# Patient Record
Sex: FEMALE | Race: WHITE | Employment: OTHER | URBAN - METROPOLITAN AREA
[De-identification: names, ages, dates, MRNs, and addresses within clinical notes are randomized per-mention and may not be internally consistent; named-entity substitution may affect disease eponyms.]

---

## 2017-01-19 ENCOUNTER — OFFICE VISIT (OUTPATIENT)
Dept: INTERNAL MEDICINE CLINIC | Facility: CLINIC | Age: 62
End: 2017-01-19

## 2017-01-19 VITALS
SYSTOLIC BLOOD PRESSURE: 130 MMHG | WEIGHT: 256 LBS | DIASTOLIC BLOOD PRESSURE: 80 MMHG | RESPIRATION RATE: 16 BRPM | BODY MASS INDEX: 40.18 KG/M2 | HEART RATE: 76 BPM | HEIGHT: 67 IN

## 2017-01-19 DIAGNOSIS — R73.03 PREDIABETES: ICD-10-CM

## 2017-01-19 DIAGNOSIS — E66.01 OBESITY, CLASS III, BMI 40-49.9 (MORBID OBESITY) (HCC): ICD-10-CM

## 2017-01-19 DIAGNOSIS — Z51.81 ENCOUNTER FOR THERAPEUTIC DRUG MONITORING: Primary | ICD-10-CM

## 2017-01-19 PROCEDURE — 99213 OFFICE O/P EST LOW 20 MIN: CPT | Performed by: NURSE PRACTITIONER

## 2017-01-19 RX ORDER — METFORMIN HYDROCHLORIDE 750 MG/1
750 TABLET, EXTENDED RELEASE ORAL
Qty: 90 TABLET | Refills: 0 | Status: SHIPPED | OUTPATIENT
Start: 2017-01-19 | End: 2017-02-17 | Stop reason: DRUGHIGH

## 2017-01-19 RX ORDER — TOPIRAMATE 50 MG/1
50 TABLET, FILM COATED ORAL 2 TIMES DAILY
Qty: 180 TABLET | Refills: 0 | Status: SHIPPED | OUTPATIENT
Start: 2017-01-19 | End: 2017-03-16 | Stop reason: ALTCHOICE

## 2017-01-19 RX ORDER — BUPROPION HYDROCHLORIDE 100 MG/1
100 TABLET ORAL 2 TIMES DAILY
Qty: 180 TABLET | Refills: 0 | Status: SHIPPED | OUTPATIENT
Start: 2017-01-19 | End: 2017-03-16 | Stop reason: DRUGHIGH

## 2017-01-19 RX ORDER — PHENTERMINE HYDROCHLORIDE 15 MG/1
15 CAPSULE ORAL EVERY MORNING
Qty: 30 CAPSULE | Refills: 0 | Status: SHIPPED | OUTPATIENT
Start: 2017-01-19 | End: 2017-02-17

## 2017-01-19 NOTE — PROGRESS NOTES
CC: Patient presents with:  Weight Check: lost 2 pounds       HPI:   Obesity. Patient doing well on phentermine, topamax, metformin and wellbutrin which she feels has really helped with her stress level.       Current Outpatient Prescriptions:  Phent Vitamins-Minerals (MULTIPLE VITAMINS/WOMENS OR) Take 1 tablet by mouth daily. Disp:  Rfl:    Red Yeast Rice 600 MG Oral Cap Take 1 capsule by mouth daily. Disp:  Rfl:    Ascorbic Acid (VITAMIN C) 250 MG Oral Tab Take 250 mg by mouth daily.  Disp:  Rfl: types were placed in this encounter. Signed Prescriptions Disp Refills    Phentermine HCl 15 MG Oral Cap 30 capsule 0      Sig: Take 1 capsule (15 mg total) by mouth every morning.       MetFORMIN HCl  MG Oral Tablet 24 Hr 90 tablet 0      Sig:

## 2017-01-19 NOTE — PATIENT INSTRUCTIONS
Get back to physical therapy. Caring for Your Back Throughout the Day  Take care of your back throughout the day. You will likely have fewer back problems if you do. Try to warm up before you move. Shift positions often.  Also do your best to form healt

## 2017-01-23 ENCOUNTER — TELEPHONE (OUTPATIENT)
Dept: FAMILY MEDICINE CLINIC | Facility: CLINIC | Age: 62
End: 2017-01-23

## 2017-01-23 NOTE — TELEPHONE ENCOUNTER
Pharmacy called because patient got RX for phentermine today. They wanted to make sure NP was aware patient has high blood pressure. I informed pharmacy she is aware, okay to fill.

## 2017-02-14 ENCOUNTER — APPOINTMENT (OUTPATIENT)
Dept: LAB | Age: 62
End: 2017-02-14
Attending: NURSE PRACTITIONER
Payer: COMMERCIAL

## 2017-02-14 ENCOUNTER — OFFICE VISIT (OUTPATIENT)
Dept: FAMILY MEDICINE CLINIC | Facility: CLINIC | Age: 62
End: 2017-02-14

## 2017-02-14 VITALS
RESPIRATION RATE: 14 BRPM | SYSTOLIC BLOOD PRESSURE: 138 MMHG | HEART RATE: 68 BPM | DIASTOLIC BLOOD PRESSURE: 88 MMHG | WEIGHT: 259 LBS | BODY MASS INDEX: 41.13 KG/M2 | HEIGHT: 66.5 IN

## 2017-02-14 DIAGNOSIS — K21.9 GASTROESOPHAGEAL REFLUX DISEASE WITHOUT ESOPHAGITIS: ICD-10-CM

## 2017-02-14 DIAGNOSIS — E78.5 HYPERLIPIDEMIA, UNSPECIFIED HYPERLIPIDEMIA TYPE: ICD-10-CM

## 2017-02-14 DIAGNOSIS — I10 ESSENTIAL HYPERTENSION, BENIGN: ICD-10-CM

## 2017-02-14 DIAGNOSIS — I10 ESSENTIAL HYPERTENSION, BENIGN: Primary | ICD-10-CM

## 2017-02-14 DIAGNOSIS — R73.03 PREDIABETES: ICD-10-CM

## 2017-02-14 DIAGNOSIS — E66.01 OBESITY, CLASS III, BMI 40-49.9 (MORBID OBESITY) (HCC): ICD-10-CM

## 2017-02-14 LAB
ALBUMIN SERPL-MCNC: 4 G/DL (ref 3.5–4.8)
ALP LIVER SERPL-CCNC: 78 U/L (ref 50–130)
ALT SERPL-CCNC: 30 U/L (ref 14–54)
AST SERPL-CCNC: 27 U/L (ref 15–41)
BILIRUB SERPL-MCNC: 0.6 MG/DL (ref 0.1–2)
BUN BLD-MCNC: 21 MG/DL (ref 8–20)
CALCIUM BLD-MCNC: 9.3 MG/DL (ref 8.3–10.3)
CHLORIDE: 107 MMOL/L (ref 101–111)
CHOLEST SMN-MCNC: 207 MG/DL (ref ?–200)
CO2: 25 MMOL/L (ref 22–32)
CREAT BLD-MCNC: 1.12 MG/DL (ref 0.55–1.02)
EST. AVERAGE GLUCOSE BLD GHB EST-MCNC: 131 MG/DL (ref 68–126)
GLUCOSE BLD-MCNC: 104 MG/DL (ref 70–99)
HBA1C MFR BLD HPLC: 6.2 % (ref ?–5.7)
HDLC SERPL-MCNC: 67 MG/DL (ref 45–?)
HDLC SERPL: 3.09 {RATIO} (ref ?–4.44)
LDLC SERPL CALC-MCNC: 121 MG/DL (ref ?–130)
M PROTEIN MFR SERPL ELPH: 7.6 G/DL (ref 6.1–8.3)
NONHDLC SERPL-MCNC: 140 MG/DL (ref ?–130)
POTASSIUM SERPL-SCNC: 3.9 MMOL/L (ref 3.6–5.1)
SODIUM SERPL-SCNC: 141 MMOL/L (ref 136–144)
TRIGLYCERIDES: 94 MG/DL (ref ?–150)
VLDL: 19 MG/DL (ref 5–40)

## 2017-02-14 PROCEDURE — 83036 HEMOGLOBIN GLYCOSYLATED A1C: CPT

## 2017-02-14 PROCEDURE — 80053 COMPREHEN METABOLIC PANEL: CPT

## 2017-02-14 PROCEDURE — 99214 OFFICE O/P EST MOD 30 MIN: CPT | Performed by: NURSE PRACTITIONER

## 2017-02-14 PROCEDURE — 36415 COLL VENOUS BLD VENIPUNCTURE: CPT

## 2017-02-14 PROCEDURE — 80061 LIPID PANEL: CPT

## 2017-02-14 RX ORDER — LACTOBACIL 2/BIFIDO 1/S.THERMO 450B CELL
1 PACKET (EA) ORAL DAILY
Qty: 60 CAPSULE | Refills: 3 | COMMUNITY
Start: 2017-02-14 | End: 2019-06-19

## 2017-02-14 RX ORDER — OMEPRAZOLE 20 MG/1
20 CAPSULE, DELAYED RELEASE ORAL
Qty: 90 CAPSULE | Refills: 3 | Status: SHIPPED | OUTPATIENT
Start: 2017-02-14 | End: 2018-02-08

## 2017-02-14 RX ORDER — LISINOPRIL AND HYDROCHLOROTHIAZIDE 12.5; 1 MG/1; MG/1
1 TABLET ORAL
Qty: 90 TABLET | Refills: 3 | Status: SHIPPED | OUTPATIENT
Start: 2017-02-14 | End: 2018-02-08

## 2017-02-14 NOTE — PROGRESS NOTES
Mihcelle Reed is a 64year old female. HPI:   Patient presents for recheck of her hypertension, GERD, chronic LBP. Had back surgery in 7/2016 and reports back pain is improved but still present.  Continues with Cymbalta for LBP which is helpful at with goal <140/90.  - Lisinopril-Hydrochlorothiazide 10-12.5 MG Oral Tab; Take 1 tablet by mouth once daily. Dispense: 90 tablet; Refill: 3  - Probiotic Product (VSL#3) Oral Cap; Take 1 capsule by mouth daily. Dispense: 60 capsule; Refill: 3    2.  Hyperl can take time to develop into a habit, typically 6-8 weeks. Begin at your own pace.   A lifetime of fitness offers many benefits like:  · Decreasing your risk of health problems, such as heart disease, high blood pressure, diabetes, and some types of cancer

## 2017-02-14 NOTE — PATIENT INSTRUCTIONS
Advise to increase Metformin XR to 1500 mg daily. Recommend Victoza or Saxenda add. Exercise necessary to help with weight loss and control BP. Consider dietician follow up at [facility] due to stagnant weight. ASCVD risk at 5.3%.  Consider statin therapy, ranjit

## 2017-02-15 RX ORDER — CYCLOBENZAPRINE HCL 10 MG
TABLET ORAL
Qty: 45 TABLET | Refills: 0 | Status: SHIPPED | OUTPATIENT
Start: 2017-02-15 | End: 2019-01-10

## 2017-02-15 NOTE — TELEPHONE ENCOUNTER
Medication: Cyclobenzaprine    Date of last refill: 12/5/16  [place]    Last office visit: 10/20/16  Due back to clinic per last office note:   Follow up at the completion of physical therapy or as needed  Date next o

## 2017-02-15 NOTE — TELEPHONE ENCOUNTER
Flexeril was approved  If she continues to have ongoing back problems recommend she make a follow-up appointment.   Her last visit was in October 2016

## 2017-02-17 ENCOUNTER — OFFICE VISIT (OUTPATIENT)
Dept: INTERNAL MEDICINE CLINIC | Facility: CLINIC | Age: 62
End: 2017-02-17

## 2017-02-17 VITALS
SYSTOLIC BLOOD PRESSURE: 136 MMHG | RESPIRATION RATE: 16 BRPM | BODY MASS INDEX: 40.18 KG/M2 | HEIGHT: 67 IN | DIASTOLIC BLOOD PRESSURE: 88 MMHG | WEIGHT: 256 LBS | HEART RATE: 88 BPM

## 2017-02-17 DIAGNOSIS — Z51.81 ENCOUNTER FOR THERAPEUTIC DRUG MONITORING: Primary | ICD-10-CM

## 2017-02-17 DIAGNOSIS — R73.03 PREDIABETES: ICD-10-CM

## 2017-02-17 DIAGNOSIS — E66.01 OBESITY, CLASS III, BMI 40-49.9 (MORBID OBESITY) (HCC): ICD-10-CM

## 2017-02-17 PROCEDURE — 99213 OFFICE O/P EST LOW 20 MIN: CPT | Performed by: NURSE PRACTITIONER

## 2017-02-17 RX ORDER — PHENTERMINE HYDROCHLORIDE 15 MG/1
15 CAPSULE ORAL EVERY MORNING
Qty: 30 CAPSULE | Refills: 0 | Status: SHIPPED | OUTPATIENT
Start: 2017-02-17 | End: 2017-03-16

## 2017-02-17 RX ORDER — METFORMIN HYDROCHLORIDE 750 MG/1
1500 TABLET, EXTENDED RELEASE ORAL
Qty: 180 TABLET | Refills: 1 | Status: SHIPPED | OUTPATIENT
Start: 2017-02-17 | End: 2017-05-18

## 2017-02-17 NOTE — PATIENT INSTRUCTIONS
Get back to exercise at Cardiac Rehab [phone]  Make appointment with back doctor to see if Physical Therapy would be an option  Diabetes: The Benefits of Exercise     Take the stairs whenever you can.    Exercise can lower blood sugar, help control wilfrido Your main goal is to become more active. Even a little bit helps. Choose an activity that you like. Walking is one great form of exercise that everyone can do.  Talk to your healthcare provider about any limits you may have before starting with an exercise

## 2017-02-17 NOTE — PROGRESS NOTES
CC: Patient presents with:  Weight Check: no weight change       HPI:   Morbid Obesity. Patient is doing well on phentermine, wellbutrin, topamax and metformin without any complaints.   is not working and at home all the time now so stress is Fluticasone Propionate (FLONASE) 50 MCG/ACT Nasal Suspension 2 sprays by Nasal route daily. (Patient taking differently: 2 sprays by Nasal route daily as needed.  ) Disp: 3 Bottle Rfl: 3   Calcium Carbonate (CALTRATE 600 OR) Take 1 tablet by mouth daily. Gastroesophageal reflux disease     Bilateral lumbar radiculopathy     Stress        REVIEW OF SYSTEMS:   RESPIRATORY: denies shortness of breath   CARDIOVASCULAR: denies chest pain  GI: denies constipation  BACK/MUSCULOSKELETAL: still some lower back pain indicates understanding of these issues and agrees to the plan. Return in about 4 weeks (around 3/17/2017).

## 2017-03-02 ENCOUNTER — TELEPHONE (OUTPATIENT)
Dept: FAMILY MEDICINE CLINIC | Facility: CLINIC | Age: 62
End: 2017-03-02

## 2017-03-07 ENCOUNTER — NURSE ONLY (OUTPATIENT)
Dept: CARDIAC REHAB | Facility: HOSPITAL | Age: 62
End: 2017-03-07
Attending: FAMILY MEDICINE

## 2017-03-14 NOTE — TELEPHONE ENCOUNTER
Why was she out of the gym? Was this related to hx of LBP and surgery over 1 year ago or cardiac reason? If cardiac reason will need cardiology clearance. Please clarify as patient did not inform me of any restrictions when at [facility]. Thanks.

## 2017-03-14 NOTE — TELEPHONE ENCOUNTER
Reviewed EPIC, Patient is currently in Phase III or Cardiac rehab. Last seen by YAN Morales on 02/14/2017, however continues to see [facility].    msg forward to Erich Pond

## 2017-03-16 ENCOUNTER — OFFICE VISIT (OUTPATIENT)
Dept: INTERNAL MEDICINE CLINIC | Facility: CLINIC | Age: 62
End: 2017-03-16

## 2017-03-16 ENCOUNTER — TELEPHONE (OUTPATIENT)
Dept: INTERNAL MEDICINE CLINIC | Facility: CLINIC | Age: 62
End: 2017-03-16

## 2017-03-16 VITALS
SYSTOLIC BLOOD PRESSURE: 138 MMHG | WEIGHT: 259 LBS | RESPIRATION RATE: 16 BRPM | HEART RATE: 80 BPM | HEIGHT: 67 IN | DIASTOLIC BLOOD PRESSURE: 88 MMHG | BODY MASS INDEX: 40.65 KG/M2

## 2017-03-16 DIAGNOSIS — E66.01 OBESITY, CLASS III, BMI 40-49.9 (MORBID OBESITY) (HCC): ICD-10-CM

## 2017-03-16 DIAGNOSIS — Z51.81 ENCOUNTER FOR THERAPEUTIC DRUG MONITORING: Primary | ICD-10-CM

## 2017-03-16 PROCEDURE — 99213 OFFICE O/P EST LOW 20 MIN: CPT | Performed by: NURSE PRACTITIONER

## 2017-03-16 RX ORDER — PHENTERMINE HYDROCHLORIDE 15 MG/1
15 CAPSULE ORAL EVERY MORNING
Qty: 30 CAPSULE | Refills: 0 | Status: SHIPPED | OUTPATIENT
Start: 2017-03-16 | End: 2017-04-13

## 2017-03-16 RX ORDER — BUPROPION HYDROCHLORIDE 150 MG/1
150 TABLET ORAL DAILY
Qty: 30 TABLET | Refills: 0 | Status: SHIPPED | OUTPATIENT
Start: 2017-03-16 | End: 2017-04-13

## 2017-03-16 NOTE — PROGRESS NOTES
CC: Patient presents with:  Weight Check: 3 pound weight gain       HPI:   Morbid Obesity. Patient tolerating phentermine,  Increased metformin, topamax and  Wellbutrin. Feels helps with appetite controll and feels better.  Has restarted Phase 3 Crdia Rfl:    Fluticasone Propionate (FLONASE) 50 MCG/ACT Nasal Suspension 2 sprays by Nasal route daily.  (Patient taking differently: 2 sprays by Nasal route daily as needed.  ) Disp: 3 Bottle Rfl: 3   Calcium Carbonate (CALTRATE 600 OR) Take 1 tablet by mouth Gastroesophageal reflux disease     Bilateral lumbar radiculopathy     Stress        REVIEW OF SYSTEMS:   RESPIRATORY: denies shortness of breath   CARDIOVASCULAR: denies chest pain  GI: denies constipation    EXAM:   /88 mmHg  Pulse 80  Resp 16  Ht

## 2017-03-16 NOTE — PATIENT INSTRUCTIONS
Stop topiramate and buproprion 100mg   Exercise: Measuring Your Pace  Getting your heart to work at the right pace is important. It means you’ll develop better aerobic endurance.  This happens because your heart gets stronger and more efficient from the [employer] Date Last Reviewed: 8/13/2015  © 6784-6545 [address], [address] [place], [place] [address]. All rights reserved. This information is not intended as a substitute for professional medical care.  Always follow your healthcare professional

## 2017-03-23 ENCOUNTER — OFFICE VISIT (OUTPATIENT)
Dept: INTERNAL MEDICINE CLINIC | Facility: CLINIC | Age: 62
End: 2017-03-23

## 2017-03-23 VITALS — WEIGHT: 259 LBS | BODY MASS INDEX: 41 KG/M2

## 2017-03-23 DIAGNOSIS — E66.01 OBESITY, CLASS III, BMI 40-49.9 (MORBID OBESITY) (HCC): ICD-10-CM

## 2017-03-23 PROCEDURE — 97802 MEDICAL NUTRITION INDIV IN: CPT | Performed by: DIETITIAN, REGISTERED

## 2017-03-23 NOTE — PROGRESS NOTES
INITIAL OUTPATIENT NUTRITION CONSULTATION    Nutrition Assessment    Medical Diagnosis: Obesity, reduced GFR with elevated BUN/Cr    Physical Findings: knee pain    Client Hx: 64year old female,  with adult children    Problem List as of 3/23/2 Probiotic Product (VSL#3) Oral Cap Take 1 capsule by mouth daily.  Disp: 60 capsule Rfl: 3   Albuterol Sulfate HFA (PROAIR HFA) 108 (90 BASE) MCG/ACT Inhalation Aero Soln Inhale 2 puffs into the lungs every 4 (four) hours as needed for Wheezing or Shortne Range Status   02/14/2017 121 <130 mg/dL Final   ----------  LDL-CHOLESTEROL   Date Value Ref Range Status   11/21/2013 138* <130 mg/dL (calc) Final   Comment:     Desirable range <100 mg/dL for patients with CHD or  diabetes and <70 mg/dL for diabetic pat Activity: 1-2 hrs/week at cardiac rehab    Food Journal: no    Spent 45 minutes in consultation with the patient. Nutrition Intervention/Education:  Comprehensive nutrition education and evaluation provided for weight loss.  Pt familiar to me through L

## 2017-04-13 ENCOUNTER — OFFICE VISIT (OUTPATIENT)
Dept: INTERNAL MEDICINE CLINIC | Facility: CLINIC | Age: 62
End: 2017-04-13

## 2017-04-13 VITALS
WEIGHT: 253 LBS | SYSTOLIC BLOOD PRESSURE: 132 MMHG | HEART RATE: 80 BPM | DIASTOLIC BLOOD PRESSURE: 84 MMHG | HEIGHT: 67 IN | RESPIRATION RATE: 16 BRPM | BODY MASS INDEX: 39.71 KG/M2

## 2017-04-13 DIAGNOSIS — B37.2 YEAST INFECTION OF THE SKIN: ICD-10-CM

## 2017-04-13 DIAGNOSIS — F43.9 STRESS: ICD-10-CM

## 2017-04-13 DIAGNOSIS — Z51.81 ENCOUNTER FOR THERAPEUTIC DRUG MONITORING: Primary | ICD-10-CM

## 2017-04-13 DIAGNOSIS — E66.01 OBESITY, CLASS III, BMI 40-49.9 (MORBID OBESITY) (HCC): ICD-10-CM

## 2017-04-13 PROCEDURE — 99213 OFFICE O/P EST LOW 20 MIN: CPT | Performed by: NURSE PRACTITIONER

## 2017-04-13 RX ORDER — BUPROPION HYDROCHLORIDE 150 MG/1
150 TABLET ORAL DAILY
Qty: 30 TABLET | Refills: 2 | Status: SHIPPED | OUTPATIENT
Start: 2017-04-13 | End: 2017-06-13

## 2017-04-13 RX ORDER — PHENTERMINE HYDROCHLORIDE 15 MG/1
15 CAPSULE ORAL EVERY MORNING
Qty: 30 CAPSULE | Refills: 0 | Status: SHIPPED | OUTPATIENT
Start: 2017-04-13 | End: 2017-05-11

## 2017-04-13 RX ORDER — CLOTRIMAZOLE AND BETAMETHASONE DIPROPIONATE 10; .64 MG/G; MG/G
1 CREAM TOPICAL 2 TIMES DAILY PRN
Qty: 45 G | Refills: 1 | Status: SHIPPED | OUTPATIENT
Start: 2017-04-13 | End: 2017-04-14

## 2017-04-13 NOTE — PROGRESS NOTES
CC: Patient presents with:  Weight Check: lost 6 pounds       HPI:   Obesity.  Patient doing well on phentermine, metformin and likes trokendi a lot and easier to get bupropion in 1 dose with all other meds in am.  She is going to Phase 3 cardiac reha route daily. (Patient taking differently: 2 sprays by Nasal route daily as needed.  ) Disp: 3 Bottle Rfl: 3   Calcium Carbonate (CALTRATE 600 OR) Take 1 tablet by mouth daily.    Disp:  Rfl:    Omega-3 Fatty Acids (FISH OIL) 1200 MG Oral Cap Take 1 capsule OF SYSTEMS:   RESPIRATORY: denies shortness of breath   CARDIOVASCULAR: denies chest pain  GI: denies constipation or diarrhea    EXAM:   /84 mmHg  Pulse 80  Resp 16  Ht 67\"  Wt 253 lb  BMI 39.62 kg/m2  LMP 07/01/2007  GENERAL: pleasant,but sad, A/O psychologist and very interested in meeting with her. Will have Heidi Camacho PsycD call patient. 3. Yeast infection of skin.  She develps reddish rash in abdominal area from skin chafing and sweat and cream has worked before to help and she brought in l

## 2017-04-13 NOTE — PATIENT INSTRUCTIONS
Will have Lexie Daniel PsyD call you as with a patient currently. Keep up the good work and exercising. Stress Relief: Activities  When you're feeling stressed, some simple exercises can provide relief right away.  These exercises are not the kind

## 2017-04-14 DIAGNOSIS — B37.2 YEAST INFECTION OF THE SKIN: Primary | ICD-10-CM

## 2017-04-14 RX ORDER — CLOTRIMAZOLE AND BETAMETHASONE DIPROPIONATE 10; .64 MG/G; MG/G
CREAM TOPICAL
Qty: 45 G | Refills: 1 | Status: SHIPPED | OUTPATIENT
Start: 2017-04-14 | End: 2021-11-04

## 2017-04-14 NOTE — TELEPHONE ENCOUNTER
[facility] would like clarification on the DIRECTIONS, it states to apply \"1 tube. \"     Please advise on the clarification to the instructions.

## 2017-05-04 ENCOUNTER — OFFICE VISIT (OUTPATIENT)
Dept: SURGERY | Facility: CLINIC | Age: 62
End: 2017-05-04

## 2017-05-04 ENCOUNTER — TELEPHONE (OUTPATIENT)
Dept: SURGERY | Facility: CLINIC | Age: 62
End: 2017-05-04

## 2017-05-04 VITALS
HEART RATE: 99 BPM | HEIGHT: 66 IN | BODY MASS INDEX: 39.86 KG/M2 | DIASTOLIC BLOOD PRESSURE: 80 MMHG | SYSTOLIC BLOOD PRESSURE: 126 MMHG | WEIGHT: 248 LBS | RESPIRATION RATE: 16 BRPM

## 2017-05-04 DIAGNOSIS — Z98.890 STATUS POST LUMBAR SPINE SURGERY FOR DECOMPRESSION OF SPINAL CORD: ICD-10-CM

## 2017-05-04 DIAGNOSIS — M54.42 CHRONIC LEFT-SIDED LOW BACK PAIN WITH LEFT-SIDED SCIATICA: ICD-10-CM

## 2017-05-04 DIAGNOSIS — M51.37 DEGENERATION OF LUMBAR OR LUMBOSACRAL INTERVERTEBRAL DISC: Primary | ICD-10-CM

## 2017-05-04 DIAGNOSIS — G89.29 CHRONIC LEFT-SIDED LOW BACK PAIN WITH LEFT-SIDED SCIATICA: ICD-10-CM

## 2017-05-04 PROCEDURE — 99212 OFFICE O/P EST SF 10 MIN: CPT | Performed by: PHYSICIAN ASSISTANT

## 2017-05-04 RX ORDER — MELOXICAM 15 MG/1
15 TABLET ORAL DAILY
Qty: 30 TABLET | Refills: 0 | Status: SHIPPED | OUTPATIENT
Start: 2017-05-04 | End: 2017-07-24

## 2017-05-04 RX ORDER — CYCLOBENZAPRINE HCL 10 MG
10 TABLET ORAL 3 TIMES DAILY PRN
Qty: 60 TABLET | Refills: 0 | Status: SHIPPED | OUTPATIENT
Start: 2017-05-04 | End: 2017-05-11

## 2017-05-04 NOTE — PATIENT INSTRUCTIONS
Refill policies:    • Allow 2 business days for refills; controlled substances may take longer.   • Contact your pharmacy at least 5 days prior to running out of medication and have them send an electronic request or submit request through the “request re insurance carrier to obtain pre-certification or prior authorization. Unfortunately, [facility] has seen an increase in denial of payment even though the procedure/test has been pre-certified.   You are strongly encouraged to contact your insurance carrier to v

## 2017-05-04 NOTE — PROGRESS NOTES
Neurosurgery Clinic Visit  2017    Tory Cord PCP:  Gary Forte MD    10/3/1955 MRN [id number]       CC: Back Pain    HPI:    Patient continues to experience lumbosacral midline pain into the left buttock, sometimes lateral left thigh.   Danielle Rodríguez acetaminophen 325 MG Oral Tab  Take 650 mg by mouth every 6 (six) hours as needed for Pain.  Disp:   Rfl:     topiramate 25 MG Oral Tab  Take 1 tablet (25 mg total) by mouth 2 (two) times daily.  Disp: 60 tablet  Rfl: 2    Fluticasone Propionate (FLONASE) 2.  L4-5 degenerative spondylolisthesis with axial back pain improved    PLAN:             Switch to mobic qd. Rx flexeril refilled. MRI Lumbar w/ and w/o. XR Lumbar flex/ex. Consult to pain service, she has never had injections for her lumbar pain.   Kylee Theodore

## 2017-05-08 ENCOUNTER — OFFICE VISIT (OUTPATIENT)
Dept: INTERNAL MEDICINE CLINIC | Facility: CLINIC | Age: 62
End: 2017-05-08

## 2017-05-08 VITALS — WEIGHT: 250.81 LBS | BODY MASS INDEX: 41 KG/M2

## 2017-05-08 DIAGNOSIS — E66.01 OBESITY, CLASS III, BMI 40-49.9 (MORBID OBESITY) (HCC): ICD-10-CM

## 2017-05-08 PROCEDURE — 97803 MED NUTRITION INDIV SUBSEQ: CPT | Performed by: DIETITIAN, REGISTERED

## 2017-05-08 NOTE — PROGRESS NOTES
FOLLOW UP NUTRITION CONSULTATION    Nutrition Assessment    Number of consultations with dietitian: 2    Height:  Ht Readings from Last 1 Encounters:  05/04/17 : 66\"      Weight:   Wt Readings from Last 2 Encounters:  05/08/17 : 250 lb 12.8 oz  05/04/1

## 2017-05-09 ENCOUNTER — HOSPITAL ENCOUNTER (OUTPATIENT)
Dept: GENERAL RADIOLOGY | Age: 62
Discharge: HOME | End: 2017-05-09
Attending: PHYSICIAN ASSISTANT
Payer: COMMERCIAL

## 2017-05-09 ENCOUNTER — HOSPITAL ENCOUNTER (OUTPATIENT)
Dept: MRI IMAGING | Age: 62
Discharge: HOME | End: 2017-05-09
Attending: PHYSICIAN ASSISTANT
Payer: COMMERCIAL

## 2017-05-09 DIAGNOSIS — M54.42 CHRONIC LEFT-SIDED LOW BACK PAIN WITH LEFT-SIDED SCIATICA: ICD-10-CM

## 2017-05-09 DIAGNOSIS — G89.29 CHRONIC LEFT-SIDED LOW BACK PAIN WITH LEFT-SIDED SCIATICA: ICD-10-CM

## 2017-05-09 DIAGNOSIS — M51.37 DEGENERATION OF LUMBAR OR LUMBOSACRAL INTERVERTEBRAL DISC: ICD-10-CM

## 2017-05-09 DIAGNOSIS — Z98.890 STATUS POST LUMBAR SPINE SURGERY FOR DECOMPRESSION OF SPINAL CORD: ICD-10-CM

## 2017-05-09 PROCEDURE — 72114 X-RAY EXAM L-S SPINE BENDING: CPT | Performed by: PHYSICIAN ASSISTANT

## 2017-05-09 PROCEDURE — A9575 INJ GADOTERATE MEGLUMI 0.1ML: HCPCS | Performed by: PHYSICIAN ASSISTANT

## 2017-05-09 PROCEDURE — 72158 MRI LUMBAR SPINE W/O & W/DYE: CPT | Performed by: PHYSICIAN ASSISTANT

## 2017-05-11 ENCOUNTER — OFFICE VISIT (OUTPATIENT)
Dept: INTERNAL MEDICINE CLINIC | Facility: CLINIC | Age: 62
End: 2017-05-11

## 2017-05-11 VITALS
BODY MASS INDEX: 38.92 KG/M2 | RESPIRATION RATE: 16 BRPM | HEART RATE: 100 BPM | WEIGHT: 248 LBS | SYSTOLIC BLOOD PRESSURE: 118 MMHG | HEIGHT: 67 IN | DIASTOLIC BLOOD PRESSURE: 78 MMHG

## 2017-05-11 DIAGNOSIS — Z51.81 ENCOUNTER FOR THERAPEUTIC DRUG MONITORING: Primary | ICD-10-CM

## 2017-05-11 DIAGNOSIS — E66.01 OBESITY, CLASS III, BMI 40-49.9 (MORBID OBESITY) (HCC): ICD-10-CM

## 2017-05-11 PROCEDURE — 99213 OFFICE O/P EST LOW 20 MIN: CPT | Performed by: NURSE PRACTITIONER

## 2017-05-11 RX ORDER — PHENTERMINE HYDROCHLORIDE 15 MG/1
15 CAPSULE ORAL EVERY MORNING
Qty: 30 CAPSULE | Refills: 0 | Status: SHIPPED | OUTPATIENT
Start: 2017-05-11 | End: 2017-06-13

## 2017-05-11 NOTE — PATIENT INSTRUCTIONS
Stress Relief: Relaxation  Focusing the mind helps provide stress relief. Taking 5 to 10 minutes to practice relaxation each day helps you feel more refreshed. The following exercises can be done almost anywhere.  Try one or more until you find what works © 0795-1607 [address], [address]. All rights reserved. This information is not intended as a substitute for professional medical care. Always follow your healthcare professional's instructions.

## 2017-05-11 NOTE — PROGRESS NOTES
CC: Patient presents with:  Weight Check: lost 5 pounds       HPI:   Obesity. Patient is doing well on phentermine, bupropion and metformin. She is a bit unsteady today as does not have cane.  Had MRI of back this week and will be following up with  Disp: 3 Bottle Rfl: 3   Calcium Carbonate (CALTRATE 600 OR) Take 1 tablet by mouth daily. Disp:  Rfl:    Omega-3 Fatty Acids (FISH OIL) 1200 MG Oral Cap Take 1 capsule by mouth daily.    Disp:  Rfl:    Fructooligosaccharides (FOS) Oral Powder Take 1 Packa pain  GI: denies constipation    EXAM:   /78 mmHg  Pulse 100  Resp 16  Ht 67\"  Wt 248 lb  BMI 38.83 kg/m2  LMP 07/01/2007  GENERAL: pleasant,  A/O x3  HEENT: throat is well developed, well nourished and in no apparent distress, A/O x3  HEENT: throat

## 2017-05-15 ENCOUNTER — TELEPHONE (OUTPATIENT)
Dept: SURGERY | Facility: CLINIC | Age: 62
End: 2017-05-15

## 2017-05-15 NOTE — TELEPHONE ENCOUNTER
LMTCB. Per report, no changes when compared to previous MRI done one year ago.    Will be reviewed in detail at upcoming appt

## 2017-05-25 ENCOUNTER — OFFICE VISIT (OUTPATIENT)
Dept: SURGERY | Facility: CLINIC | Age: 62
End: 2017-05-25

## 2017-05-25 ENCOUNTER — TELEPHONE (OUTPATIENT)
Dept: SURGERY | Facility: CLINIC | Age: 62
End: 2017-05-25

## 2017-05-25 ENCOUNTER — TELEPHONE (OUTPATIENT)
Dept: FAMILY MEDICINE CLINIC | Facility: CLINIC | Age: 62
End: 2017-05-25

## 2017-05-25 VITALS
RESPIRATION RATE: 18 BRPM | BODY MASS INDEX: 39.86 KG/M2 | WEIGHT: 248 LBS | HEART RATE: 90 BPM | SYSTOLIC BLOOD PRESSURE: 114 MMHG | HEIGHT: 66 IN | DIASTOLIC BLOOD PRESSURE: 72 MMHG

## 2017-05-25 DIAGNOSIS — M51.37 DEGENERATION OF LUMBAR OR LUMBOSACRAL INTERVERTEBRAL DISC: Primary | ICD-10-CM

## 2017-05-25 DIAGNOSIS — M43.10 ACQUIRED SPONDYLOLISTHESIS: ICD-10-CM

## 2017-05-25 DIAGNOSIS — M48.061 LUMBAR STENOSIS: ICD-10-CM

## 2017-05-25 DIAGNOSIS — Z98.890 STATUS POST LUMBAR SPINE SURGERY FOR DECOMPRESSION OF SPINAL CORD: ICD-10-CM

## 2017-05-25 PROCEDURE — 99214 OFFICE O/P EST MOD 30 MIN: CPT | Performed by: PHYSICIAN ASSISTANT

## 2017-05-25 NOTE — PATIENT INSTRUCTIONS
Refill policies:    • Allow 2-3 business days for refills; controlled substances may take longer.   • Contact your pharmacy at least 5 days prior to running out of medication and have them send an electronic request or submit request through the [facility] have a procedure or additional testing performed. [facility]) will contact your insurance carrier to obtain pre-certification or prior authorization.     Unfortunately, [facility] has seen an increase in denial of payment even though the p

## 2017-05-25 NOTE — TELEPHONE ENCOUNTER
Patient scheduled for BILATERAL LUMBAR 4-5 DECOMPRESSIVE LAMINOTOMY, LEFT LUMBAR 4-5 REVISION on 7/26/2017 with Dr. Kvng Sanchez    Pre-op instructions discussed with patient and surgical packet provided:    · You will need to contact the Pre-admission department

## 2017-05-25 NOTE — PROGRESS NOTES
Neurosurgery Clinic Visit      Katya Arcos PCP:  Christos Dutta MD    10/3/1955 MRN [id number]       CC: Back Pain    HPI:   she returns today complaining of some mild back pain but primarily buttock and leg pain.   She is unable to walk more than 60 MG Oral Cap DR Particles  Take 1 capsule (60 mg total) by mouth once daily.  Disp: 90 capsule  Rfl: 3    Lisinopril-Hydrochlorothiazide 10-12.5 MG Oral Tab  Take 1 tablet by mouth daily.  Disp: 90 tablet  Rfl: 3    omeprazole 20 MG Oral Capsule Delayed Face is symmetrical. Cranial nerves II-XII are grossly intact. SPINE: Mild back pain on flexion, extension. Sensation to light touch is intact bilateral in both legs. Nontender to palpation over the lumbar area without spasms. Gait intact.  Patient can h

## 2017-05-25 NOTE — TELEPHONE ENCOUNTER
Received fax from Dr. Marguerite Paredes office, patient having surgery 07/26/17, needs H&P.  Form in triage

## 2017-05-26 ENCOUNTER — TELEPHONE (OUTPATIENT)
Dept: SURGERY | Facility: CLINIC | Age: 62
End: 2017-05-26

## 2017-05-26 NOTE — TELEPHONE ENCOUNTER
Has an appointment with Jaye HEREDIA 6/16/2017 needs to be within 30 days of surgery so we need to move this to after 6/26/17 called LMOM to call back needs Pre op with doctor

## 2017-05-26 NOTE — TELEPHONE ENCOUNTER
Pre op appt given for 7/13/17.    I spoke with surgeon office and they will order labs needed  Message left notifying Pt that surgeon will order labs

## 2017-05-26 NOTE — TELEPHONE ENCOUNTER
Ton Miller from PCP office calling states she received letter from our office stating pt is having labs, wants to know if they need to place labs or we do, informed her labs will be placed through preadmission.

## 2017-06-01 ENCOUNTER — NURSE ONLY (OUTPATIENT)
Dept: CARDIAC REHAB | Facility: HOSPITAL | Age: 62
End: 2017-06-01
Attending: FAMILY MEDICINE

## 2017-06-05 ENCOUNTER — TELEPHONE (OUTPATIENT)
Dept: FAMILY MEDICINE CLINIC | Facility: CLINIC | Age: 62
End: 2017-06-05

## 2017-06-05 DIAGNOSIS — Z01.818 PRE-OP TESTING: Primary | ICD-10-CM

## 2017-06-13 ENCOUNTER — OFFICE VISIT (OUTPATIENT)
Dept: INTERNAL MEDICINE CLINIC | Facility: CLINIC | Age: 62
End: 2017-06-13

## 2017-06-13 VITALS
HEIGHT: 67 IN | HEART RATE: 96 BPM | DIASTOLIC BLOOD PRESSURE: 82 MMHG | BODY MASS INDEX: 39.24 KG/M2 | WEIGHT: 250 LBS | RESPIRATION RATE: 16 BRPM | SYSTOLIC BLOOD PRESSURE: 132 MMHG

## 2017-06-13 DIAGNOSIS — E66.01 OBESITY, CLASS III, BMI 40-49.9 (MORBID OBESITY) (HCC): ICD-10-CM

## 2017-06-13 DIAGNOSIS — M48.061 LUMBAR STENOSIS: ICD-10-CM

## 2017-06-13 DIAGNOSIS — F43.9 STRESS: ICD-10-CM

## 2017-06-13 DIAGNOSIS — Z51.81 ENCOUNTER FOR THERAPEUTIC DRUG MONITORING: Primary | ICD-10-CM

## 2017-06-13 PROCEDURE — 99213 OFFICE O/P EST LOW 20 MIN: CPT | Performed by: NURSE PRACTITIONER

## 2017-06-13 RX ORDER — PHENTERMINE HYDROCHLORIDE 15 MG/1
15 CAPSULE ORAL EVERY MORNING
Qty: 30 CAPSULE | Refills: 0 | Status: SHIPPED | OUTPATIENT
Start: 2017-06-13 | End: 2017-07-13

## 2017-06-13 RX ORDER — BUPROPION HYDROCHLORIDE 150 MG/1
150 TABLET ORAL DAILY
Qty: 30 TABLET | Refills: 2 | Status: SHIPPED | OUTPATIENT
Start: 2017-06-13 | End: 2017-09-11

## 2017-06-13 RX ORDER — PHENTERMINE HYDROCHLORIDE 15 MG/1
15 CAPSULE ORAL EVERY MORNING
COMMUNITY
End: 2017-06-13

## 2017-06-13 NOTE — PATIENT INSTRUCTIONS
Stress Relief: Changing Your Response  You are the only person responsible for your thoughts and actions. This simple idea is your most powerful tool for managing stress. Start by having realistic expectations.  Then learn to recognize what you can—and ca

## 2017-06-13 NOTE — PROGRESS NOTES
CC: Patient presents with:  Weight Check: 2 pound weight gain       HPI:   Obesity. Patient doing well on phentermine, trokendi,bupropion and metformin.  Not exercising as often due to lower back pain that is not being relieved by pain meds completely daily.   Disp:  Rfl:    Multiple Vitamins-Minerals (MULTIPLE VITAMINS/WOMENS OR) Take 1 tablet by mouth daily. Disp:  Rfl:    Red Yeast Rice 600 MG Oral Cap Take 1 capsule by mouth daily.    Disp:  Rfl:    Ascorbic Acid (VITAMIN C) 250 MG Oral Tab Take 25 RRR without murmur  GI: +BS's    No orders of the defined types were placed in this encounter. Signed Prescriptions Disp Refills    Phentermine HCl 15 MG Oral Cap 30 capsule 0      Sig: Take 1 capsule (15 mg total) by mouth every morning.       [facility]

## 2017-06-14 ENCOUNTER — OFFICE VISIT (OUTPATIENT)
Dept: INTERNAL MEDICINE CLINIC | Facility: CLINIC | Age: 62
End: 2017-06-14

## 2017-06-14 VITALS — WEIGHT: 250 LBS | BODY MASS INDEX: 39 KG/M2

## 2017-06-14 DIAGNOSIS — E66.9 OBESITY, CLASS II, BMI 35-39.9: ICD-10-CM

## 2017-06-14 PROCEDURE — 97803 MED NUTRITION INDIV SUBSEQ: CPT | Performed by: DIETITIAN, REGISTERED

## 2017-06-14 NOTE — PROGRESS NOTES
FOLLOW UP NUTRITION CONSULTATION    Nutrition Assessment    Number of consultations with dietitian: 3    Height:  Ht Readings from Last 1 Encounters:  06/13/17 : 67\"      Weight:   Wt Readings from Last 2 Encounters:  06/14/17 : 250 lb  06/13/17 : 250

## 2017-06-15 ENCOUNTER — TELEPHONE (OUTPATIENT)
Dept: SURGERY | Facility: CLINIC | Age: 62
End: 2017-06-15

## 2017-06-16 NOTE — TELEPHONE ENCOUNTER
Patient is now considering lumbar fusion surgery  Scheduled to see Dr. Ronaldo Alvarado next week for surgical discussion

## 2017-06-16 NOTE — TELEPHONE ENCOUNTER
Patient was called to discuss her upcoming surgery. She has had a L4-5 decompression which did not help her back pain and she had recurrent stenosis and radiculopathy.     The risk and benefits were reviewed again for doing another decompression versus a d

## 2017-06-16 NOTE — TELEPHONE ENCOUNTER
Pt states she is worried because she has already had surgery before and is afraid that this surgery might fail too since it is the same surgery.  She would like a call from PA explaining how this surgery is different from the previous and reassurance regard

## 2017-06-22 ENCOUNTER — OFFICE VISIT (OUTPATIENT)
Dept: SURGERY | Facility: CLINIC | Age: 62
End: 2017-06-22

## 2017-06-22 VITALS
SYSTOLIC BLOOD PRESSURE: 168 MMHG | DIASTOLIC BLOOD PRESSURE: 98 MMHG | HEIGHT: 66 IN | BODY MASS INDEX: 39.53 KG/M2 | HEART RATE: 96 BPM | WEIGHT: 246 LBS | RESPIRATION RATE: 18 BRPM

## 2017-06-22 DIAGNOSIS — Z98.890 STATUS POST LUMBAR SPINE SURGERY FOR DECOMPRESSION OF SPINAL CORD: Primary | ICD-10-CM

## 2017-06-22 DIAGNOSIS — M48.061 LUMBAR STENOSIS: ICD-10-CM

## 2017-06-22 PROCEDURE — 99214 OFFICE O/P EST MOD 30 MIN: CPT | Performed by: NEUROLOGICAL SURGERY

## 2017-06-22 NOTE — PATIENT INSTRUCTIONS
Refill policies:    • Allow 2-3 business days for refills; controlled substances may take longer.   • Contact your pharmacy at least 5 days prior to running out of medication and have them send an electronic request or submit request through the [facility] have a procedure or additional testing performed. [facility]) will contact your insurance carrier to obtain pre-certification or prior authorization.     Unfortunately, [facility] has seen an increase in denial of payment even though the p

## 2017-06-22 NOTE — PROGRESS NOTES
Neurosurgery Clinic Visit  2017    Raji Phelps PCP:  Divya Gutierrez MD    10/3/1955 MRN [id number]     HISTORY OF PRESENT ILLNESS:  Raji Phelps is a(n) 64year old female here for opinion regarding upcoming surgery.   Patient had surger appreciative        Time spent on counseling/coordination of care:  15 Minutes    Total time spent with patient:  130 Keiko Perdomo MD   [address]  6/22/2017  4:45 PM

## 2017-06-22 NOTE — PROGRESS NOTES
Here to discuss fusion surgery with Dr. Mayco Eason. Currently she is scheduled with Dr. Chung Tellez for decompressive surgery.

## 2017-07-06 NOTE — TELEPHONE ENCOUNTER
Attempted to start PA again, but was unsuccessful. Will need to try to call insurance again. See referral pool for more details.

## 2017-07-12 ENCOUNTER — TELEPHONE (OUTPATIENT)
Dept: SURGERY | Facility: CLINIC | Age: 62
End: 2017-07-12

## 2017-07-13 ENCOUNTER — TELEPHONE (OUTPATIENT)
Dept: FAMILY MEDICINE CLINIC | Facility: CLINIC | Age: 62
End: 2017-07-13

## 2017-07-13 ENCOUNTER — TELEPHONE (OUTPATIENT)
Dept: SURGERY | Facility: CLINIC | Age: 62
End: 2017-07-13

## 2017-07-13 ENCOUNTER — OFFICE VISIT (OUTPATIENT)
Dept: INTERNAL MEDICINE CLINIC | Facility: CLINIC | Age: 62
End: 2017-07-13

## 2017-07-13 VITALS
WEIGHT: 250 LBS | HEIGHT: 67 IN | BODY MASS INDEX: 39.24 KG/M2 | SYSTOLIC BLOOD PRESSURE: 122 MMHG | HEART RATE: 100 BPM | DIASTOLIC BLOOD PRESSURE: 78 MMHG | RESPIRATION RATE: 16 BRPM

## 2017-07-13 DIAGNOSIS — R73.03 PREDIABETES: ICD-10-CM

## 2017-07-13 DIAGNOSIS — E66.01 OBESITY, CLASS III, BMI 40-49.9 (MORBID OBESITY) (HCC): ICD-10-CM

## 2017-07-13 DIAGNOSIS — Z51.81 ENCOUNTER FOR THERAPEUTIC DRUG MONITORING: ICD-10-CM

## 2017-07-13 PROCEDURE — 99213 OFFICE O/P EST LOW 20 MIN: CPT | Performed by: NURSE PRACTITIONER

## 2017-07-13 RX ORDER — METFORMIN HYDROCHLORIDE 750 MG/1
750 TABLET, EXTENDED RELEASE ORAL
Qty: 30 TABLET | Refills: 2 | Status: SHIPPED | OUTPATIENT
Start: 2017-07-13 | End: 2017-10-10

## 2017-07-13 RX ORDER — PHENTERMINE HYDROCHLORIDE 15 MG/1
15 CAPSULE ORAL EVERY MORNING
Qty: 30 CAPSULE | Refills: 1 | Status: SHIPPED | OUTPATIENT
Start: 2017-07-13 | End: 2017-09-11

## 2017-07-13 NOTE — PROGRESS NOTES
CC: Patient presents with:  Weight Check: no weight change       HPI:   Obesity. Patient having surgery on  7/28 and has stopped phentermine, trokendi, and metformin per presurgical instructions. She is still taking bupropion.  Wrote rx so she can Ronni SPASMS Disp: 45 tablet Rfl: 0   DULoxetine HCl 60 MG Oral Cap DR Particles Take 1 capsule (60 mg total) by mouth once daily. Disp: 90 capsule Rfl: 3   Omega-3 Fatty Acids (FISH OIL) 1200 MG Oral Cap Take 1 capsule by mouth daily.    Disp:  Rfl:    Fructooli BMI 39.16 kg/m²   GENERAL: frustrated with pain,, A/O x3  HEENT:  throat is clear, PERRLA  LUNGS: CTA bilat   CARDIO: RRR without murmur  GI: +BS's    No orders of the defined types were placed in this encounter.        Signed Prescriptions Disp Refills

## 2017-07-13 NOTE — TELEPHONE ENCOUNTER
If patient does reschedule we did receive authorization today for surgery. Authorization #:[id number]   3 days were authorized from 7/26/17-7/28/17.

## 2017-07-13 NOTE — TELEPHONE ENCOUNTER
Pt had Pre-Op scheduled for today w/Dr Villalpando and cancelled appt. LMOM to call back and reschedule. Form in TRIAGE

## 2017-07-13 NOTE — PATIENT INSTRUCTIONS
Eating Out: Tips for Making Healthy Choices    It’s not easy to change the habits of a lifetime. Experts say that it can take 6 months just to change one old habit for a healthier one. That’s why gradual change is so important.  These tips are reminders o · Order meat, poultry, and fish broiled, grilled, baked, poached, or steamed. Always remove the skin from chicken. · Choose Mexican dishes made with soft, rather than crispy tortillas.  For toppings, use salsa and lettuce, rather than sour cream and cheese

## 2017-07-14 NOTE — TELEPHONE ENCOUNTER
Pt cancelled surgery with Dr. Chung Tellez and will now be going with a surgeon out of [facility]. Pt states will be having lab done at [facility]. . Advised Pt that if a pre op clearance is needed to call back for appt

## 2017-07-14 NOTE — TELEPHONE ENCOUNTER
Pt cancelled surgery with Dr. Melissa Cervantes and will now be going with a surgeon out of [facility]. Pt states will be having lab done at [facility]. . Advised Pt that if a pre op clearance is needed to call back for appt

## 2017-07-18 ENCOUNTER — TELEPHONE (OUTPATIENT)
Dept: FAMILY MEDICINE CLINIC | Facility: CLINIC | Age: 62
End: 2017-07-18

## 2017-07-18 NOTE — TELEPHONE ENCOUNTER
LOV 2/14/2017 called Katherin Murillo and [facility] to have her call back to see if we need to do the H&P or have they done one

## 2017-07-18 NOTE — TELEPHONE ENCOUNTER
Bob Hyatt confirmed that patient does not need an H&P from our office. They are also ordering patient's pre-op testing as well. Left message at home number for patient to call back.

## 2017-07-18 NOTE — TELEPHONE ENCOUNTER
Patient's  came in to the office with a pre op clearance needed for his wife for surgery on 7/28/17 with Jasen Billings MD and Dr. Polly Hein. They have ordered all the pre op testing (see paperwork in triage).   Please f

## 2017-07-18 NOTE — TELEPHONE ENCOUNTER
patient called back informed her that she does not need clearance from this office as surgeon did H&P already

## 2017-07-24 ENCOUNTER — OFFICE VISIT (OUTPATIENT)
Dept: FAMILY MEDICINE CLINIC | Facility: CLINIC | Age: 62
End: 2017-07-24

## 2017-07-24 VITALS
DIASTOLIC BLOOD PRESSURE: 60 MMHG | TEMPERATURE: 98 F | BODY MASS INDEX: 40.18 KG/M2 | HEART RATE: 80 BPM | HEIGHT: 66.5 IN | SYSTOLIC BLOOD PRESSURE: 114 MMHG | RESPIRATION RATE: 14 BRPM | WEIGHT: 253 LBS

## 2017-07-24 DIAGNOSIS — M54.16 BILATERAL LUMBAR RADICULOPATHY: ICD-10-CM

## 2017-07-24 DIAGNOSIS — E66.01 OBESITY, CLASS III, BMI 40-49.9 (MORBID OBESITY) (HCC): ICD-10-CM

## 2017-07-24 DIAGNOSIS — R73.03 PREDIABETES: ICD-10-CM

## 2017-07-24 DIAGNOSIS — M51.37 DEGENERATION OF LUMBAR OR LUMBOSACRAL INTERVERTEBRAL DISC: Primary | ICD-10-CM

## 2017-07-24 DIAGNOSIS — I10 ESSENTIAL HYPERTENSION, BENIGN: ICD-10-CM

## 2017-07-24 DIAGNOSIS — G47.33 OSA (OBSTRUCTIVE SLEEP APNEA): ICD-10-CM

## 2017-07-24 DIAGNOSIS — E78.5 HYPERLIPIDEMIA, UNSPECIFIED HYPERLIPIDEMIA TYPE: ICD-10-CM

## 2017-07-24 DIAGNOSIS — Z01.818 PREOP EXAMINATION: ICD-10-CM

## 2017-07-24 PROCEDURE — 99244 OFF/OP CNSLTJ NEW/EST MOD 40: CPT | Performed by: FAMILY MEDICINE

## 2017-07-24 NOTE — PROGRESS NOTES
Payor: ARMINDA HEDRICK PPO / Plan: ARMINDA PPO / Product Type: PPO /     Subjective:  HPI:  64year old female who has a past medical history of Arthritis; Back problem; Depression; Diabetes ([facility] [address]  Esophageal reflux; HIGH BLOOD PRESSURE; Impacted cerumen; Obesity; OS If you have been put to sleep for an operation were there any anaesthetic problems? no  8. Do you suffer from epilepsy or seizures? no  9. Do you have any problems with pain, stiffness or arthritis in your neck or jaw? no  10. Do you have thyroid disease? Christian Woody Father    • Heart Disease Mother    • NAYLA[other] Ilan Anytere Brother      Past Surgical History:  8/2007: KNEE REPLACEMENT SURGERY      Comment: right  8/2013: KNEE REPLACEMENT SURGERY      Comment: left  7/11/2016: OTHER      Comment: LUM Fructooligosaccharides (FOS) Oral Powder Take 1 Package by mouth daily. Disp:  Rfl:    Multiple Vitamins-Minerals (MULTIPLE VITAMINS/WOMENS OR) Take 1 tablet by mouth daily. Disp:  Rfl:    Red Yeast Rice 600 MG Oral Cap Take 1 capsule by mouth daily. visit:    Essential hypertension, benign  Stable. Hold meds on morning of surgery and add back as tolerated. Not on beta blocker. NAYLA (obstructive sleep apnea)  As below.     Prediabetes  Hyperlipidemia, unspecified hyperlipidemia type  Obesity, Class II Oct 27, 2017 11:10 AM CDT FOLLOW UP with Iliana Ballesteros MD [facility] P[facility] ([facility])        [address]  [address] [place], [facility], [place]

## 2017-08-16 ENCOUNTER — MED REC SCAN ONLY (OUTPATIENT)
Dept: SURGERY | Facility: CLINIC | Age: 62
End: 2017-08-16

## 2017-09-06 ENCOUNTER — TELEPHONE (OUTPATIENT)
Dept: SURGERY | Facility: CLINIC | Age: 62
End: 2017-09-06

## 2017-09-20 RX ORDER — TOPIRAMATE 50 MG/1
CAPSULE, EXTENDED RELEASE ORAL
Qty: 30 CAPSULE | Refills: 2 | Status: SHIPPED | OUTPATIENT
Start: 2017-09-20 | End: 2018-02-22

## 2017-10-10 DIAGNOSIS — R73.03 PREDIABETES: ICD-10-CM

## 2017-10-10 DIAGNOSIS — Z51.81 ENCOUNTER FOR THERAPEUTIC DRUG MONITORING: ICD-10-CM

## 2017-10-10 DIAGNOSIS — E66.01 OBESITY, CLASS III, BMI 40-49.9 (MORBID OBESITY) (HCC): ICD-10-CM

## 2017-10-11 NOTE — TELEPHONE ENCOUNTER
Requesting MetFORMIN HCl  MG Oral Tablet 24 Hr   LOV: 7/13/17  RTC: 6 week  Last Labs: 2/16/17  Filled: 7/13/17 #30 with 2 refills    Future Appointments  Date Time Provider Denisa Guerrero   10/27/2017 10:00 AM YAN Limon [facility]

## 2017-10-13 RX ORDER — METFORMIN HYDROCHLORIDE 750 MG/1
TABLET, EXTENDED RELEASE ORAL
Qty: 30 TABLET | Refills: 2 | Status: SHIPPED | OUTPATIENT
Start: 2017-10-13 | End: 2018-01-10

## 2017-10-25 ENCOUNTER — TELEPHONE (OUTPATIENT)
Dept: FAMILY MEDICINE CLINIC | Facility: CLINIC | Age: 62
End: 2017-10-25

## 2017-11-01 ENCOUNTER — HOSPITAL ENCOUNTER (INPATIENT)
Facility: HOSPITAL | Age: 62
LOS: 1 days | Discharge: HOME | DRG: 103 | End: 2017-11-03
Admitting: STUDENT IN AN ORGANIZED HEALTH CARE EDUCATION/TRAINING PROGRAM
Payer: COMMERCIAL

## 2017-11-01 DIAGNOSIS — R51.9 NONINTRACTABLE HEADACHE, UNSPECIFIED CHRONICITY PATTERN, UNSPECIFIED HEADACHE TYPE: ICD-10-CM

## 2017-11-01 DIAGNOSIS — G91.9 HYDROCEPHALUS (HCC): Primary | ICD-10-CM

## 2017-11-02 ENCOUNTER — APPOINTMENT (OUTPATIENT)
Dept: CT IMAGING | Facility: HOSPITAL | Age: 62
DRG: 103 | End: 2017-11-02
Payer: COMMERCIAL

## 2017-11-02 ENCOUNTER — APPOINTMENT (OUTPATIENT)
Dept: MRI IMAGING | Facility: HOSPITAL | Age: 62
DRG: 103 | End: 2017-11-02
Attending: NURSE PRACTITIONER
Payer: COMMERCIAL

## 2017-11-02 PROBLEM — R51.9 NONINTRACTABLE HEADACHE, UNSPECIFIED CHRONICITY PATTERN, UNSPECIFIED HEADACHE TYPE: Status: ACTIVE | Noted: 2017-11-02

## 2017-11-02 PROBLEM — G91.9 HYDROCEPHALUS (HCC): Status: ACTIVE | Noted: 2017-11-02

## 2017-11-02 PROCEDURE — 99222 1ST HOSP IP/OBS MODERATE 55: CPT | Performed by: STUDENT IN AN ORGANIZED HEALTH CARE EDUCATION/TRAINING PROGRAM

## 2017-11-02 PROCEDURE — 5A09357 ASSISTANCE WITH RESPIRATORY VENTILATION, LESS THAN 24 CONSECUTIVE HOURS, CONTINUOUS POSITIVE AIRWAY PRESSURE: ICD-10-PCS | Performed by: HOSPITALIST

## 2017-11-02 PROCEDURE — 70450 CT HEAD/BRAIN W/O DYE: CPT

## 2017-11-02 PROCEDURE — 70551 MRI BRAIN STEM W/O DYE: CPT | Performed by: NURSE PRACTITIONER

## 2017-11-02 RX ORDER — POTASSIUM CHLORIDE 20 MEQ/1
40 TABLET, EXTENDED RELEASE ORAL EVERY 4 HOURS
Status: COMPLETED | OUTPATIENT
Start: 2017-11-02 | End: 2017-11-02

## 2017-11-02 RX ORDER — CYCLOBENZAPRINE HCL 10 MG
10 TABLET ORAL 3 TIMES DAILY PRN
Status: DISCONTINUED | OUTPATIENT
Start: 2017-11-02 | End: 2017-11-03

## 2017-11-02 RX ORDER — ONDANSETRON 2 MG/ML
4 INJECTION INTRAMUSCULAR; INTRAVENOUS EVERY 6 HOURS PRN
Status: DISCONTINUED | OUTPATIENT
Start: 2017-11-02 | End: 2017-11-03

## 2017-11-02 RX ORDER — ENOXAPARIN SODIUM 100 MG/ML
40 INJECTION SUBCUTANEOUS DAILY
Status: DISCONTINUED | OUTPATIENT
Start: 2017-11-02 | End: 2017-11-03

## 2017-11-02 RX ORDER — ALPRAZOLAM 0.25 MG/1
0.12 TABLET ORAL ONCE
Status: COMPLETED | OUTPATIENT
Start: 2017-11-02 | End: 2017-11-02

## 2017-11-02 RX ORDER — MAGNESIUM OXIDE 400 MG (241.3 MG MAGNESIUM) TABLET
400 TABLET DAILY
Status: DISCONTINUED | OUTPATIENT
Start: 2017-11-02 | End: 2017-11-03

## 2017-11-02 RX ORDER — KETOROLAC TROMETHAMINE 30 MG/ML
30 INJECTION, SOLUTION INTRAMUSCULAR; INTRAVENOUS EVERY 6 HOURS PRN
Status: DISCONTINUED | OUTPATIENT
Start: 2017-11-02 | End: 2017-11-03

## 2017-11-02 RX ORDER — ACETAMINOPHEN 325 MG/1
650 TABLET ORAL EVERY 6 HOURS PRN
Status: DISCONTINUED | OUTPATIENT
Start: 2017-11-02 | End: 2017-11-03

## 2017-11-02 RX ORDER — DEXAMETHASONE SODIUM PHOSPHATE 4 MG/ML
10 VIAL (ML) INJECTION ONCE
Status: COMPLETED | OUTPATIENT
Start: 2017-11-02 | End: 2017-11-02

## 2017-11-02 RX ORDER — LISINOPRIL AND HYDROCHLOROTHIAZIDE 12.5; 1 MG/1; MG/1
1 TABLET ORAL
Status: DISCONTINUED | OUTPATIENT
Start: 2017-11-02 | End: 2017-11-02 | Stop reason: RX

## 2017-11-02 RX ORDER — DEXTROSE MONOHYDRATE 25 G/50ML
50 INJECTION, SOLUTION INTRAVENOUS
Status: DISCONTINUED | OUTPATIENT
Start: 2017-11-02 | End: 2017-11-02

## 2017-11-02 RX ORDER — PANTOPRAZOLE SODIUM 20 MG/1
20 TABLET, DELAYED RELEASE ORAL
Status: DISCONTINUED | OUTPATIENT
Start: 2017-11-02 | End: 2017-11-03

## 2017-11-02 RX ORDER — SODIUM CHLORIDE 9 MG/ML
INJECTION, SOLUTION INTRAVENOUS CONTINUOUS
Status: ACTIVE | OUTPATIENT
Start: 2017-11-02 | End: 2017-11-02

## 2017-11-02 RX ORDER — SODIUM CHLORIDE 9 MG/ML
INJECTION, SOLUTION INTRAVENOUS CONTINUOUS
Status: DISCONTINUED | OUTPATIENT
Start: 2017-11-02 | End: 2017-11-02

## 2017-11-02 RX ORDER — FLUTICASONE PROPIONATE 50 MCG
2 SPRAY, SUSPENSION (ML) NASAL
Status: DISCONTINUED | OUTPATIENT
Start: 2017-11-02 | End: 2017-11-03

## 2017-11-02 RX ORDER — DIPHENHYDRAMINE HYDROCHLORIDE 50 MG/ML
25 INJECTION INTRAMUSCULAR; INTRAVENOUS ONCE
Status: COMPLETED | OUTPATIENT
Start: 2017-11-02 | End: 2017-11-02

## 2017-11-02 RX ORDER — ONDANSETRON 2 MG/ML
4 INJECTION INTRAMUSCULAR; INTRAVENOUS EVERY 4 HOURS PRN
Status: DISCONTINUED | OUTPATIENT
Start: 2017-11-02 | End: 2017-11-02

## 2017-11-02 RX ORDER — AMOXICILLIN 500 MG
1 CAPSULE ORAL DAILY
Status: DISCONTINUED | OUTPATIENT
Start: 2017-11-02 | End: 2017-11-02 | Stop reason: RX

## 2017-11-02 RX ORDER — DULOXETIN HYDROCHLORIDE 30 MG/1
60 CAPSULE, DELAYED RELEASE ORAL
Status: DISCONTINUED | OUTPATIENT
Start: 2017-11-02 | End: 2017-11-03

## 2017-11-02 RX ORDER — HYDROMORPHONE HYDROCHLORIDE 1 MG/ML
0.5 INJECTION, SOLUTION INTRAMUSCULAR; INTRAVENOUS; SUBCUTANEOUS EVERY 30 MIN PRN
Status: ACTIVE | OUTPATIENT
Start: 2017-11-02 | End: 2017-11-02

## 2017-11-02 RX ORDER — KETOROLAC TROMETHAMINE 30 MG/ML
15 INJECTION, SOLUTION INTRAMUSCULAR; INTRAVENOUS ONCE
Status: COMPLETED | OUTPATIENT
Start: 2017-11-02 | End: 2017-11-02

## 2017-11-02 RX ORDER — METOCLOPRAMIDE HYDROCHLORIDE 5 MG/ML
5 INJECTION INTRAMUSCULAR; INTRAVENOUS ONCE
Status: COMPLETED | OUTPATIENT
Start: 2017-11-02 | End: 2017-11-02

## 2017-11-02 NOTE — PAYOR COMM NOTE
--------------  ADMISSION REVIEW     Payor: [address]  Subscriber #:  [id number]  Authorization Number: N/A    Admit date: 11/2/17  Admit time: 2439       Admitting Physician: Walter Ward MD  Attending Physician:  Jesse Dsouza MD  [facility] Arthritis    • Back problem     lumbar   • Diabetes ([facility] [address]     \"borderline\"   • Esophageal reflux    • HIGH BLOOD PRESSURE    • Impacted cerumen    • Obesity    • NAYLA (obstructive sleep apnea) PSG 10-01-15    AHI [address] 80%   • Osteoarthrosis, unsp meningismus, no thyromegaly    Chest: clear breath sounds without wheezes, rales     Heart: Regular rate and rhythm     Abdomen: Soft, nondistended,  No tenderness     Back: No costovertebral angle tenderness.      Extremities: Warm, well perfused, without fourth ventricle. The third ventricle measures up to 19 mm in transverse diameter. No definite mass lesion is identified. Appearance is overall concerning for potential normal pressure hydrocephalus.  Would recommend followup with MRI of the brain with a by Heri Carroll MD at 11/2/2017  5:53 AM     Author:  Heri Carroll MD Service:  (none) Author Type:  Physician    Filed:  11/2/2017  5:53 AM Date of Service:  11/2/2017  3:11 AM Status:  Addendum    :  Heri Carroll MD (Physician)    Related Not REPLACEMENT SURGERY      Comment: right  8/2013: KNEE REPLACEMENT SURGERY      Comment: left  7/11/2016: OTHER      Comment: LUMBAR LAMINECTOMY 1 LEVEL  2017: SPINAL FUSION      Comment: L4-L5 posterolateral spinal fusion with L4-L5                decompre Fructooligosaccharides (FOS) Oral Powder Take 1 Package by mouth daily. Disp:  Rfl:    Multiple Vitamins-Minerals (MULTIPLE VITAMINS/WOMENS OR) Take 1 tablet by mouth daily. Disp:  Rfl:    Red Yeast Rice 600 MG Oral Cap Take 1 capsule by mouth daily. hours.    Imaging: Imaging data reviewed in Epic. ASSESSMENT / PLAN:     1. Migraine headache  1. Ventriculomegaly on imaging-unclear if this is etiology  2. NeuroSx on Cs  3. Monitor BP  4. Neuro Checks  2. Hypertension  1. Continue home meds:  ACEi-H

## 2017-11-02 NOTE — CONSULTS
[id number] [facility] Neurosurgery Consult    Luis Kingston Patient Status:  Inpatient    10/3/1955 MRN [id number]   [facility] 8NE-A Attending Katya Valencia MD   Hosp Day # 0 PCP Olga Lindo MD     REASON FOR CONSULTATION:  Vj Delvalle decompressive lumbar laminectomy with nerve                root decompression    FAMILY HISTORY:  family history includes Breast Cancer (age of onset: 48) in her sister; Cancer in her sister; Diabetes in her father; Heart Disease in her [de-identified] by mouth daily. Disp:  Rfl:  Not Taking   Omega-3 Fatty Acids (FISH OIL) 1200 MG Oral Cap Take 1 capsule by mouth daily. Disp:  Rfl:  Not Taking   Fructooligosaccharides (FOS) Oral Powder Take 1 Package by mouth daily.    Disp:  Rfl:  Not Taking   Multi year old female in no acute distress. HEENT:  Normocephalic, atraumatic  LUNGS: Clear to auscultation bilaterally. HEART:  S1, S2, Regular rate and rhythm. NEUROLOGICAL:  This patient is alert and orientated x 3. Speech fluent. Comprehension intact. represent a developmental anomaly. Given the recent headache, will wait for neuro-optho eval and then discuss treatment vs discharge with close follow-up.      Benjamin Rushing MD  Neurological Surgeon  [facility] [address], [address]

## 2017-11-02 NOTE — ED PROVIDER NOTES
Patient Seen in: [facility] Emergency Department    History   Patient presents with:  Headache (neurologic)  Nausea/Vomiting/Diarrhea (gastrointestinal)    Stated Complaint: MIGRAINE    HPI    Patient is a pleasant 42-year-old female who presents to Comment: LUMBAR LAMINECTOMY 1 LEVEL  2017: SPINAL FUSION      Comment: L4-L5 posterolateral spinal fusion with L4-L5                decompressive lumbar laminectomy with nerve                root decompression    Family History   Problem Relation Age of costovertebral angle tenderness. Extremities: Warm, well perfused, without edema    No significant deformity or joint abnormality    Calves are symmetric and nontender  Good peripheral color, cap refill .        Skin: Unremarkable without lesions or pranav hydrocephalus. Would recommend followup with MRI of the brain with and without contrast for further evaluation.     A suggestion of a somewhat colpocephalic appearance is noted with disproportionate dilatation of the occipital horns of the lateral ventricle

## 2017-11-02 NOTE — PLAN OF CARE
Hx: HTN, NAYLA, GERD, spinal surgery    Patient came in with migraine not relieved with home meds. Given IVF, Reglan, benadryl, Toradol, and decadron in ER. Pain went from a 9/10 to a 1/10. CT brain shows hydrocephalus.  Neurosurgery consulted due to her hist

## 2017-11-02 NOTE — H&P
[facility] HOSPITALIST  History and Physical     Marin Patricio Patient Status:  Emergency    10/3/1955 MRN [id number]   Location [address] [facility] Attending Cuauhtemoc Jose MD   Hosp Day # 0 PCP Efra Delacruz MD     Chief Complain decompression    Social History:  reports that she has never smoked. She has never used smokeless tobacco. She reports that she drinks alcohol. She reports that she does not use drugs.     Family History:   Family History   Problem Relation Age of Onset   • 1 tablet by mouth daily. Disp:  Rfl:    Omega-3 Fatty Acids (FISH OIL) 1200 MG Oral Cap Take 1 capsule by mouth daily. Disp:  Rfl:    Fructooligosaccharides (FOS) Oral Powder Take 1 Package by mouth daily.    Disp:  Rfl:    Multiple Vitamins-Minerals (M SCr of 1.01 mg/dL). No results for input(s): PTP, INR in the last 72 hours. No results for input(s): TROP, CK in the last 72 hours. Imaging: Imaging data reviewed in Epic. ASSESSMENT / PLAN:     1. Migraine headache  1.  Ventriculomegaly on im

## 2017-11-03 VITALS
OXYGEN SATURATION: 94 % | HEART RATE: 82 BPM | RESPIRATION RATE: 16 BRPM | HEIGHT: 66 IN | TEMPERATURE: 98 F | BODY MASS INDEX: 40.23 KG/M2 | WEIGHT: 250.31 LBS | SYSTOLIC BLOOD PRESSURE: 146 MMHG | DIASTOLIC BLOOD PRESSURE: 84 MMHG

## 2017-11-03 NOTE — PLAN OF CARE
NEUROLOGICAL - ADULT    • Achieves stable or improved neurological status Progressing        PAIN - ADULT    • Verbalizes/displays adequate comfort level or patient's stated pain goal Progressing        SAFETY ADULT - FALL    • Free from fall injury Progre Cs  3. Monitor BP  4. Neuro Checks  2. Hypertension  1. Continue home meds: ACEi-HCTZ   3. DM type 2   1. ISS   4.  Obesity         Quality:  · DVT Prophylaxis: Lovnox   · CODE status: full

## 2017-11-03 NOTE — PLAN OF CARE
NEUROLOGICAL - ADULT    • Achieves stable or improved neurological status Adequate for Discharge        PAIN - ADULT    • Verbalizes/displays adequate comfort level or patient's stated pain goal Adequate for Discharge        SAFETY ADULT - FALL    • Free f

## 2017-11-03 NOTE — PLAN OF CARE
NURSING DISCHARGE NOTE    Discharged 0171 via wheelchair. Accompanied by transport   Belongings all taken with patient. Verbalizes understanding of discharge instructions.  present at bedside.

## 2017-11-03 NOTE — PROGRESS NOTES
[id number]  Neurosurgery Progress Note    Taveras Klinefelter Patient Status:  Inpatient    10/3/1955 MRN [id number]   [facility] 8NE-A Attending Burgess Thuan MD   Hosp Day # 1 PCP Maynor Gallardo MD     CC: Headache    Subjective Assessment:  1. Congenital Ventriculomegaly without visual impairment or transependymal flow - mental capacity normal  2.  Headaches     Plan:  Appreciate Dr. Anjelica Weber' evaluation and recommendations  HA management    She can be d/c'd from the NS standpo

## 2017-11-03 NOTE — PLAN OF CARE
A/o x3, denies chest pain, sob, lightheadedness, dizziness. Up and walking around inside room. Returning back to home with . Dr. Nay Black, Dr. Ernie HEREDIA from neurosurgery all saw patient and is ok for discharge.    No other current com

## 2017-11-03 NOTE — CONSULTS
Neuro-ophthalmology Consult  Consulted by Dr Jenn Markham    Pt seen with  and chart reviewed. Pt with H/O migraines many years ago with severe HA's and N/V over the past 2-3 weeks .   Seen in ER and noted to have severe hydrocephalus on CT of the br

## 2017-11-04 NOTE — DISCHARGE SUMMARY
[facility] HOSPITALIST  DISCHARGE SUMMARY     Fer Nobles Patient Status:  Inpatient    10/3/1955 MRN [id number]   [facility] 8NE-A Attending No att. providers found   Hosp Day # 1 PCP Ana Allen MD     Date of Admission: 2017 Brief Synopsis: Patient is 80-year-old female admitted with headaches. Which are likely secondary to sinus headaches which in turn were causing migraine headaches. She had a CT of the brain which showed severe hydrocephalus.   She subsequently underwe these medications      Instructions Prescription details   ascorbic acid (VITAMIN C) 250 MG Tabs      Take 250 mg by mouth daily. Refills:  0     CALTRATE 600 OR      Take 1 tablet by mouth daily.    Refills:  0     clotrimazole-betamethasone 1-0.05 % Cre 1:00    Lokesh Robledo MD  [address] [address] [phone]    Call        Vital signs:       Physical Exam:    General: No acute distress. Respiratory: Clear to auscultation bilaterally. No wheezes. No rhonchi.   [place]

## 2017-11-08 NOTE — PAYOR COMM NOTE
--------------  DISCHARGE REVIEW    Payor: [facility]  Subscriber #:  [id number]  Authorization Number: N/A    Admit date: 11/2/17  Admit time:  9734  Discharge Date: 11/3/2017 11:24 AM     Admitting Physician: Jael Sandoval MD  Attending y stenosis    History of Present Illness:   Bladimir Blackwell is a 58year old female with  H/o HTN, s/p lumbar surgery , NAYLA on CPAP- pt presents to the hospital with 2 days of 8/10 headache located frontally.  She notes having some nausea, vomiting- denies incidental.  She did not have any further headaches and she was discharged home to follow-up with Dr. Antony Cruz with neurology in 4 weeks.     Procedures during hospitalization:   • none    Incidental or significant findings and recommendations (brief descrip VITAMINS/WOMENS OR      Take 1 tablet by mouth daily. Refills:  0     omeprazole 20 MG Cpdr  Commonly known as:  PRILOSEC      Take 1 capsule (20 mg total) by mouth once daily.    Quantity:  90 capsule  Refills:  3     Red Yeast Rice 600 MG Caps      Take

## 2017-11-16 ENCOUNTER — HOSPITAL ENCOUNTER (OUTPATIENT)
Dept: PHYSICAL THERAPY | Facility: HOSPITAL | Age: 62
Setting detail: THERAPIES SERIES
Discharge: HOME | End: 2017-11-16
Attending: ORTHOPAEDIC SURGERY
Payer: COMMERCIAL

## 2017-11-16 DIAGNOSIS — M54.16 LUMBAR RADICULOPATHY: ICD-10-CM

## 2017-11-16 DIAGNOSIS — M54.50 LUMBAR BACK PAIN: ICD-10-CM

## 2017-11-16 PROCEDURE — 97162 PT EVAL MOD COMPLEX 30 MIN: CPT

## 2017-11-16 PROCEDURE — 97110 THERAPEUTIC EXERCISES: CPT

## 2017-11-16 NOTE — PROGRESS NOTES
Physical Therapy  EVALUATION:   Referring Physician: Dr. Lida Scott  Diagnosis: LBP  , lumbar radiculopathy Date of Service: 11/16/2017     PATIENT Franki Soriano is a 58year old y/o female who presents to therapy today with complaints of back and plantar surface of toes L. Improved w/ repetition    Strength: isolated hip flex, knee ext, flex 5/5 B. Hip extension 4+/5,  Hip abd 4/5 B w/ pt struggling to maintain smooth control against isometric resistance. Abdominals poor.       Special tests: and further assessment on balance master  Education or treatment limitation: balance deficits and fall risk  Rehab Potential:good    FOTO: 43/100  Current status G Code: [id number]  Goal status G Code: 9710WX    Patient/Family/Caregiver was advised of these fi

## 2017-11-20 ENCOUNTER — HOSPITAL ENCOUNTER (OUTPATIENT)
Dept: PHYSICAL THERAPY | Facility: HOSPITAL | Age: 62
Setting detail: THERAPIES SERIES
Discharge: HOME | End: 2017-11-20
Attending: ORTHOPAEDIC SURGERY
Payer: COMMERCIAL

## 2017-11-20 PROCEDURE — 97112 NEUROMUSCULAR REEDUCATION: CPT

## 2017-11-20 PROCEDURE — 97110 THERAPEUTIC EXERCISES: CPT

## 2017-11-20 NOTE — PROGRESS NOTES
Dx:s/p lumbar fusion       Authorized # of Visits:  --         Next MD visit: none scheduled  Fall Risk: standard         Precautions: n/a             Subjective:no pain at present  Objective: ex program per grid.  Added recip toe tap in doorway to HEP

## 2017-11-27 ENCOUNTER — HOSPITAL ENCOUNTER (OUTPATIENT)
Dept: PHYSICAL THERAPY | Facility: HOSPITAL | Age: 62
Setting detail: THERAPIES SERIES
Discharge: HOME | End: 2017-11-27
Attending: ORTHOPAEDIC SURGERY
Payer: COMMERCIAL

## 2017-11-27 PROCEDURE — 97110 THERAPEUTIC EXERCISES: CPT

## 2017-11-27 PROCEDURE — 97112 NEUROMUSCULAR REEDUCATION: CPT

## 2017-11-27 NOTE — PROGRESS NOTES
Dx:s/p lumbar fusion       Authorized # of Visits:  --         Next MD visit: none scheduled  Fall Risk: standard         Precautions: n/a             Subjective:reports she fell on Wed and her back is sore. S/t she tried to sit on a bath bench and fell. progression of stability challenges    Charges:  Ex 1 NM re ed 2       Total Timed Treatment: 50 min  Total Treatment Time: 50 min

## 2017-11-29 ENCOUNTER — OFFICE VISIT (OUTPATIENT)
Dept: SURGERY | Facility: CLINIC | Age: 62
End: 2017-11-29

## 2017-11-29 VITALS — DIASTOLIC BLOOD PRESSURE: 80 MMHG | HEART RATE: 100 BPM | SYSTOLIC BLOOD PRESSURE: 120 MMHG

## 2017-11-29 DIAGNOSIS — R51.9 NONINTRACTABLE HEADACHE, UNSPECIFIED CHRONICITY PATTERN, UNSPECIFIED HEADACHE TYPE: Primary | ICD-10-CM

## 2017-11-29 DIAGNOSIS — Q03.9 CONGENITAL HYDROCEPHALUS (HCC): ICD-10-CM

## 2017-11-29 PROCEDURE — 99213 OFFICE O/P EST LOW 20 MIN: CPT | Performed by: NEUROLOGICAL SURGERY

## 2017-11-29 NOTE — PATIENT INSTRUCTIONS
Refill policies:    • Allow 2-3 business days for refills; controlled substances may take longer.   • Contact your pharmacy at least 5 days prior to running out of medication and have them send an electronic request or submit request through the [facility] have a procedure or additional testing performed. [facility]) will contact your insurance carrier to obtain pre-certification or prior authorization.     Unfortunately, [facility] has seen an increase in denial of payment even though the p

## 2017-11-29 NOTE — PROGRESS NOTES
Neurosurgery Clinic Visit  2017    Iris Headley PCP:  Leodan Steven MD    10/3/1955 MRN [id number]       CC: Hospital F/u    HPI:    Vero Casanova is here for f/u after hospital admission 17.   She presented with significant headache which res with the patient, reviewed imaging, and agreed with the plan. No surgical indications at this time. Likely a congenital variant. Continue to monitor for headaches, consider prophylactic treatment through neurology.   OK to transfer care to the neurosur

## 2017-11-30 ENCOUNTER — HOSPITAL ENCOUNTER (OUTPATIENT)
Dept: PHYSICAL THERAPY | Facility: HOSPITAL | Age: 62
Setting detail: THERAPIES SERIES
Discharge: HOME | End: 2017-11-30
Attending: ORTHOPAEDIC SURGERY
Payer: COMMERCIAL

## 2017-11-30 PROCEDURE — 97014 ELECTRIC STIMULATION THERAPY: CPT

## 2017-11-30 PROCEDURE — 97530 THERAPEUTIC ACTIVITIES: CPT

## 2017-11-30 NOTE — PROGRESS NOTES
Dx:s/p lumbar fusion       Authorized # of Visits:  --         Next MD visit: none scheduled  Fall Risk: standard         Precautions: n/a             Subjective:reports her back is more sore today.  Felt OK yesterday but awoke with inc pain today    Object contact guard        airex stance w/ wt shift> fwd reach for cones Heel toe gait w/ single rail in ll bars x 10 laps        Staggered stance on level surface w/ cerv and trunk rotation R/L > o hh w/ PT contact guard Standing targetted fwd and lateral reach

## 2017-12-01 DIAGNOSIS — E66.01 OBESITY, CLASS III, BMI 40-49.9 (MORBID OBESITY) (HCC): ICD-10-CM

## 2017-12-01 DIAGNOSIS — Z51.81 ENCOUNTER FOR THERAPEUTIC DRUG MONITORING: ICD-10-CM

## 2017-12-01 NOTE — TELEPHONE ENCOUNTER
Requesting bupropion  LOV: 7/13  RTC: 6 weeks  Filled: 6/13 #30 with 2 refills    No future appts with chau  Future Appointments  Date Time Provider Denisa Guerrero   12/4/2017 2:00 PM Mike Burnett, PT [address] [facility] PHYS [address]   12/7/2017 10:45 AM Olimpia Phipps

## 2017-12-03 RX ORDER — BUPROPION HYDROCHLORIDE 150 MG/1
150 TABLET ORAL DAILY
Qty: 30 TABLET | Refills: 0 | Status: SHIPPED | OUTPATIENT
Start: 2017-12-03 | End: 2018-02-22

## 2017-12-04 ENCOUNTER — HOSPITAL ENCOUNTER (OUTPATIENT)
Dept: PHYSICAL THERAPY | Facility: HOSPITAL | Age: 62
Setting detail: THERAPIES SERIES
Discharge: HOME | End: 2017-12-04
Attending: ORTHOPAEDIC SURGERY
Payer: COMMERCIAL

## 2017-12-04 PROCEDURE — 97112 NEUROMUSCULAR REEDUCATION: CPT

## 2017-12-04 PROCEDURE — 97110 THERAPEUTIC EXERCISES: CPT

## 2017-12-04 NOTE — PROGRESS NOTES
Dx:s/p lumbar fusion       Authorized # of Visits:  --         Next MD visit: none scheduled  Fall Risk: standard         Precautions: n/a             Subjective:did not talk to Dr. Steffen Weber back pain 3/10 across midline and perhaps due to prolonged standin reach and PT contact guard  Bridge x 5 x 2      airex stance w/ wt shift> fwd reach for cones Heel toe gait w/ single rail in ll bars x 10 laps  Manually resisted hip abd/add in hooklying L and R      Staggered stance on level surface w/ cerv and trunk rot

## 2017-12-07 ENCOUNTER — HOSPITAL ENCOUNTER (OUTPATIENT)
Dept: PHYSICAL THERAPY | Facility: HOSPITAL | Age: 62
Setting detail: THERAPIES SERIES
Discharge: HOME | End: 2017-12-07
Attending: ORTHOPAEDIC SURGERY
Payer: COMMERCIAL

## 2017-12-07 PROCEDURE — 97112 NEUROMUSCULAR REEDUCATION: CPT

## 2017-12-07 PROCEDURE — 97110 THERAPEUTIC EXERCISES: CPT

## 2017-12-07 NOTE — PROGRESS NOTES
Dx:s/p lumbar fusion       Authorized # of Visits:  --         Next MD visit: none scheduled  Fall Risk: standard         Precautions: n/a             Subjective:followed at Dr. Elyce Favre. Reports she was advised that fusion was doing well.     Objective:  Con reach for cones Heel toe gait w/ single rail in ll bars x 10 laps  Manually resisted hip abd/add in hooklying L and R cont'd     Staggered stance on level surface w/ cerv and trunk rotation R/L > o hh w/ PT contact guard Standing targetted fwd and lateral

## 2017-12-11 ENCOUNTER — HOSPITAL ENCOUNTER (OUTPATIENT)
Dept: PHYSICAL THERAPY | Facility: HOSPITAL | Age: 62
Setting detail: THERAPIES SERIES
Discharge: HOME | End: 2017-12-11
Attending: ORTHOPAEDIC SURGERY
Payer: COMMERCIAL

## 2017-12-11 PROCEDURE — 97112 NEUROMUSCULAR REEDUCATION: CPT

## 2017-12-11 PROCEDURE — 97110 THERAPEUTIC EXERCISES: CPT

## 2017-12-11 NOTE — PROGRESS NOTES
Dx:s/p lumbar fusion       Authorized # of Visits:  --         Next MD visit: none scheduled  Fall Risk: standard         Precautions: n/a             Subjective:back sore today. Lifted 25# bag of dog food. Objective:  Continued per grid. Loss of balance and eyes cont'd and progressed to amb w/ rotation amb 150 feet w. Heel strike and cued for inc stride length R w/ cane.  100 feet w/o cane    Retro step up recip L/R x 10 ea Sit<>stand from elevated chair w/ nose over toes, UE assist but w/o pushing back on

## 2017-12-14 ENCOUNTER — HOSPITAL ENCOUNTER (OUTPATIENT)
Dept: PHYSICAL THERAPY | Facility: HOSPITAL | Age: 62
Setting detail: THERAPIES SERIES
Discharge: HOME | End: 2017-12-14
Attending: ORTHOPAEDIC SURGERY
Payer: COMMERCIAL

## 2017-12-14 PROCEDURE — 97112 NEUROMUSCULAR REEDUCATION: CPT

## 2017-12-14 PROCEDURE — 97110 THERAPEUTIC EXERCISES: CPT

## 2017-12-14 NOTE — PROGRESS NOTES
Dx:s/p lumbar fusion       Authorized # of Visits:  --         Next MD visit: none scheduled  Fall Risk: standard         Precautions: n/a            Progress Summary    Pt has attended 8 visits in Physical Therapy.      Subjective: I don't think I'm ready limited, or restricted  Projected G Code:  Mobility: Walking and Moving Around CJ: 20-39% impaired, limited, or restricted    Rehab Potential: fair    Plan: Requesting orders to continue skilled Physical Therapy 2 x/week or a total of 8 additional visits ov touch on 4' step, 6\" step x 10 eal /R 6\" jessica step over x 6 x 2   ll bars: lateral step up L/R 4\" x 10 ea Stance w/ cued mod perturbations in saggital plane at shoulders and hips  B stance w/ trunk rotation and verbal cues to follow hands w/ head and

## 2017-12-19 NOTE — TELEPHONE ENCOUNTER
Requesting trokendi  LOV: 7/13  RTC: 6weeks  Filled: 9/20 #30 with 2 refills    No future appts with chau  Future Appointments  Date Time Provider Denisa Guerrero   12/21/2017 2:45 PM Meka Howell, PT [address] [facility] AT [facility] AT [facility]   12/28/2017 10:00 AM Maddison

## 2017-12-20 NOTE — TELEPHONE ENCOUNTER
Please call patient to find out if she is even still taking this as has not seen patient since July and was in ED with hydrocephaly which was a new dx.

## 2017-12-21 ENCOUNTER — APPOINTMENT (OUTPATIENT)
Dept: PHYSICAL THERAPY | Facility: HOSPITAL | Age: 62
End: 2017-12-21
Attending: ORTHOPAEDIC SURGERY
Payer: COMMERCIAL

## 2017-12-22 RX ORDER — TOPIRAMATE 50 MG/1
CAPSULE, EXTENDED RELEASE ORAL
Qty: 30 CAPSULE | Refills: 2 | OUTPATIENT
Start: 2017-12-22

## 2017-12-22 NOTE — TELEPHONE ENCOUNTER
I have not seen patient since July and not sure if she is taking as was in ED with hydrocephalus. Please call and check with patient.

## 2017-12-27 ENCOUNTER — TELEPHONE (OUTPATIENT)
Dept: INTERNAL MEDICINE CLINIC | Facility: CLINIC | Age: 62
End: 2017-12-27

## 2017-12-27 NOTE — TELEPHONE ENCOUNTER
YAN Peters   5:41 PM   12/22/17  Note      I have not seen patient since July and not sure if she is taking as was in ED with hydrocephalus. Please call and check with patient.            Ariana Piña    10:50 AM   12/19/17  Note      Requesti

## 2017-12-28 ENCOUNTER — HOSPITAL ENCOUNTER (OUTPATIENT)
Dept: PHYSICAL THERAPY | Facility: HOSPITAL | Age: 62
Setting detail: THERAPIES SERIES
Discharge: HOME | End: 2017-12-28
Attending: ORTHOPAEDIC SURGERY
Payer: COMMERCIAL

## 2017-12-28 PROCEDURE — 97112 NEUROMUSCULAR REEDUCATION: CPT

## 2017-12-28 PROCEDURE — 97110 THERAPEUTIC EXERCISES: CPT

## 2017-12-28 NOTE — PROGRESS NOTES
Dx:s/p lumbar fusion       Authorized # of Visits:  --         Next MD visit: none scheduled  Fall Risk: standard         Precautions: n/a            Progress Summary    Pt has attended 9 visits in Physical Therapy.      Subjective: fell at home on [place] physician next week. Requesting further assessment of neurological issues vs apprehension re: falls in continued balance and coordination deficits.     Patient/Family/Caregiver was advised of these findings, precautions, and treatment options and has agreed x 6 x 2 Standing heel tap fwd and lateral w/ verbal and tactile cues     ll bars: lateral step up L/R 4\" x 10 ea Stance w/ cued mod perturbations in saggital plane at shoulders and hips  B stance w/ trunk rotation and verbal cues to follow hands w/ head a

## 2018-01-02 ENCOUNTER — TELEPHONE (OUTPATIENT)
Dept: SURGERY | Facility: CLINIC | Age: 63
End: 2018-01-02

## 2018-01-02 ENCOUNTER — MED REC SCAN ONLY (OUTPATIENT)
Dept: SURGERY | Facility: CLINIC | Age: 63
End: 2018-01-02

## 2018-01-02 NOTE — TELEPHONE ENCOUNTER
Patient had surgery and stopped medication. Ashish ritchie instructed patient to wait until 2/2 appt to discuss.  Patient informed

## 2018-01-03 ENCOUNTER — HOSPITAL ENCOUNTER (OUTPATIENT)
Dept: PHYSICAL THERAPY | Facility: HOSPITAL | Age: 63
Setting detail: THERAPIES SERIES
Discharge: HOME | End: 2018-01-03
Attending: ORTHOPAEDIC SURGERY
Payer: COMMERCIAL

## 2018-01-03 PROCEDURE — 97112 NEUROMUSCULAR REEDUCATION: CPT

## 2018-01-03 PROCEDURE — 97110 THERAPEUTIC EXERCISES: CPT

## 2018-01-03 NOTE — ADDENDUM NOTE
Encounter addended by: Todd Ballard PT on: 1/3/2018 12:34 PM<BR>    Actions taken: Pend clinical note

## 2018-01-03 NOTE — PROGRESS NOTES
Dx:s/p lumbar fusion       Authorized # of Visits:  --         Next MD visit: none scheduled  Fall Risk: standard         Precautions: n/a            Progress Summary      Subjective:no additional falls since last visit.  Neurosurgeon visit cancelled but pl stance.  R rotation problematic Seated  Attempted cerv rotation in standing march w/ contact guard and stabilization assist Seated w/ Ue reach> + contralateral LE abd    Manually resisted LTR w/ verbal and tactile cues to consistently activate L hip abd PT heel strike and inc stride length  HEP modified for ankle reciprocal movment amb 100 feet w/ verbal cues                                            Skilled Services: neuromuscular re-ed- contact guard for pt safety     Charges: ex 1 NM re ed 2  Total Timed

## 2018-01-03 NOTE — PROGRESS NOTES
FALL SCREEN EVALUATION   Referring Physician: Dr. Silvia Wheat  Diagnosis: ***     Date of Service: 1/3/2018     PATIENT SUMMARY   Oliva Carrel is a 58year old y/o female who presents to therapy today with complaints of ***  Positive History of Falls: {ye 8.1s (7.1-9.0s)   66-76 y/o mean 9.2s (8.2-10.2s)   80-98 y/o mean 11.3s (10.0-12.7s)]    4 Item Dynamic Gait Index Score: ***/12 Fall Risk: {yes no:711378}  [Scores of 10 or less indicate increased risk for falls]  1.  Gait level surface: ***  Grading: Mar Impairment: Preform task with severe disruption of gait, i.e., staggers outside 15” path, loses balance, stops, reaches for wall. 4. Gait with vertical head turns: ***  Grading: Pierre the lowest category that applies.   (3) Normal: Preforms head turns smo include: Neuromuscular Re-education; Gait Training; Therapeutic Exercises; Manual Therapy;  Patient education; Home exercise program instruction; ***    Education or treatment limitation: {TX_LIMIT:1927}  Rehab Potential:{GOOD:115}    FOTO: ***/100    {G Co

## 2018-01-05 ENCOUNTER — HOSPITAL ENCOUNTER (OUTPATIENT)
Dept: PHYSICAL THERAPY | Facility: HOSPITAL | Age: 63
Setting detail: THERAPIES SERIES
Discharge: HOME | End: 2018-01-05
Attending: ORTHOPAEDIC SURGERY
Payer: COMMERCIAL

## 2018-01-05 PROCEDURE — 97112 NEUROMUSCULAR REEDUCATION: CPT

## 2018-01-05 NOTE — PROGRESS NOTES
Dx:s/p lumbar fusion       Authorized # of Visits:  --         Next MD visit: none scheduled  Fall Risk: standard         Precautions: n/a            Progress Summary      Subjective:   Will see Dr. Kristin Capps next week    Assessment: appeared to be more stable retro foot placement on 6 \" step w/ occassional hand hold for balance   Manually resisted LTR w/ verbal and tactile cues to consistently activate L hip abd PT facilitated piriformis stretch L x 10  Standing step fwd/ Bwd L/r w/ cues to land on heel and to contact guard Standing targetted fwd and lateral reach w/ cues to follow w/ eyes and head  --         Alt targetted toe tap w/ PT contact guard amb 200 feet w/ PT contact guard w/ heel strike and inc stride length  HEP modified for ankle reciprocal movment

## 2018-01-08 ENCOUNTER — TELEPHONE (OUTPATIENT)
Dept: INTERNAL MEDICINE CLINIC | Facility: CLINIC | Age: 63
End: 2018-01-08

## 2018-01-08 ENCOUNTER — HOSPITAL ENCOUNTER (OUTPATIENT)
Dept: PHYSICAL THERAPY | Facility: HOSPITAL | Age: 63
Setting detail: THERAPIES SERIES
Discharge: HOME | End: 2018-01-08
Attending: ORTHOPAEDIC SURGERY
Payer: COMMERCIAL

## 2018-01-08 PROCEDURE — 97112 NEUROMUSCULAR REEDUCATION: CPT

## 2018-01-08 NOTE — TELEPHONE ENCOUNTER
Pt called and left a vm to see if she can come in and have chaz fill out a form for her from [facility] because chaz was the one she used to see at her old office

## 2018-01-08 NOTE — PROGRESS NOTES
Dx:s/p lumbar fusion       Authorized # of Visits:  --         Next MD visit: none scheduled  Fall Risk: standard         Precautions: n/a            Progress Summary      Subjective:  Shalom Hamm on Friday.  Takes a few days to recover from soreness     Assessmen feet w/o cane cont'd -- -- Frontal plane stance w/ cerv rotation and UE reach,   Saggital plane stance attmepted but less stable sportcord resisted forward amb x 15 feet x 5     Lateral step tap w/ 6\" x 10 L/R Foot placement F/B and side step L/R w/ PT pr

## 2018-01-09 NOTE — TELEPHONE ENCOUNTER
Patient asking if Health Screening form can be filled out by one of the providers here in the office. I informed patient that she would need to have form completed by her PCP. Patient voiced understanding.

## 2018-01-10 ENCOUNTER — HOSPITAL ENCOUNTER (OUTPATIENT)
Dept: PHYSICAL THERAPY | Facility: HOSPITAL | Age: 63
Setting detail: THERAPIES SERIES
Discharge: HOME | End: 2018-01-10
Attending: ORTHOPAEDIC SURGERY
Payer: COMMERCIAL

## 2018-01-10 DIAGNOSIS — E66.01 OBESITY, CLASS III, BMI 40-49.9 (MORBID OBESITY) (HCC): ICD-10-CM

## 2018-01-10 DIAGNOSIS — Z51.81 ENCOUNTER FOR THERAPEUTIC DRUG MONITORING: ICD-10-CM

## 2018-01-10 DIAGNOSIS — R73.03 PREDIABETES: ICD-10-CM

## 2018-01-10 PROCEDURE — 97112 NEUROMUSCULAR REEDUCATION: CPT

## 2018-01-10 NOTE — PROGRESS NOTES
Dx:s/p lumbar fusion       Authorized # of Visits:  --         Next MD visit: none scheduled  Fall Risk: standard         Precautions: n/a            Progress Summary      Subjective:  Feeling better    Assessment: 30 sec sit stand x 7 reps but w/o loss of negotiate 15 feet to 9 steps Lateral step up x 10 L/R on 6\" step    amb 150 feet w. Heel strike and cued for inc stride length R w/ cane.  100 feet w/o cane cont'd -- -- Frontal plane stance w/ cerv rotation and UE reach,   Saggital plane stance attmepted

## 2018-01-11 NOTE — TELEPHONE ENCOUNTER
Requesting metformin  LOV: 7/13  RTC: 6weeks  Filled: 10/13 #30 with 2 refills    Future Appointments  Date Time Provider Denisa Guerrero   1/15/2018 10:00 AM Lani Willard, PT [facility] PHYS [facility] SPECIALTY [facility] AT [facility] AT [facility]   1/17/2018 10:45 AM Lani Willard, PT [facility] Ed

## 2018-01-14 RX ORDER — METFORMIN HYDROCHLORIDE 750 MG/1
TABLET, EXTENDED RELEASE ORAL
Qty: 30 TABLET | Refills: 2 | Status: SHIPPED | OUTPATIENT
Start: 2018-01-14 | End: 2019-02-11

## 2018-01-15 ENCOUNTER — HOSPITAL ENCOUNTER (OUTPATIENT)
Dept: PHYSICAL THERAPY | Facility: HOSPITAL | Age: 63
Setting detail: THERAPIES SERIES
Discharge: HOME | End: 2018-01-15
Attending: ORTHOPAEDIC SURGERY
Payer: COMMERCIAL

## 2018-01-15 PROCEDURE — 97112 NEUROMUSCULAR REEDUCATION: CPT

## 2018-01-15 NOTE — PROGRESS NOTES
Dx:s/p lumbar fusion       Authorized # of Visits:  --         Next MD visit: none scheduled  Fall Risk: standard         Precautions: n/a            Progress Summary      Subjective: Will see  this week.  No falls over w/e    Assessment: pt continues to tap reciprocally w/ hand hold needed in stance R more frequently than L Inc stride length w/ cues for heel strike in 11 bars.  Progressed from 11 steps to negotiate 15 feet to 9 steps Lateral step up x 10 L/R on 6\" step amb 100 feet w/ verbal cues for fwd

## 2018-01-17 ENCOUNTER — HOSPITAL ENCOUNTER (OUTPATIENT)
Dept: PHYSICAL THERAPY | Facility: HOSPITAL | Age: 63
Setting detail: THERAPIES SERIES
Discharge: HOME | End: 2018-01-17
Attending: ORTHOPAEDIC SURGERY
Payer: COMMERCIAL

## 2018-01-17 PROCEDURE — 97112 NEUROMUSCULAR REEDUCATION: CPT

## 2018-01-18 NOTE — PROGRESS NOTES
Dx:s/p lumbar fusion       Authorized # of Visits:  --         Next MD visit: none scheduled  Fall Risk: standard         Precautions: n/a            Discharge Summary    Pt has attended 16 visits in Physical Therapy.      Subjective: saw neurosurgeon and w therapist: Lindsay Boast, PT          Subjective:  ll see  this week. No falls over w/e  Wi  Assessment: pt continues to be fall risk but demonstrates improved ability for reciprocal gait pattern with verbal and tactile cue for anterior pelvic shift.  If recipmarch>recip march + UE flex Ant, med/lat toe tap reciprocally w/ hand hold needed in stance R more frequently than L Inc stride length w/ cues for heel strike in 11 bars.  Progressed from 11 steps to negotiate 15 feet to 9 steps Lateral step up x 10 L/

## 2018-01-22 ENCOUNTER — APPOINTMENT (OUTPATIENT)
Dept: PHYSICAL THERAPY | Facility: HOSPITAL | Age: 63
End: 2018-01-22
Attending: ORTHOPAEDIC SURGERY
Payer: COMMERCIAL

## 2018-02-01 ENCOUNTER — OFFICE VISIT (OUTPATIENT)
Dept: NEUROLOGY | Facility: CLINIC | Age: 63
End: 2018-02-01

## 2018-02-01 VITALS
WEIGHT: 207 LBS | DIASTOLIC BLOOD PRESSURE: 86 MMHG | SYSTOLIC BLOOD PRESSURE: 158 MMHG | HEART RATE: 108 BPM | RESPIRATION RATE: 16 BRPM | BODY MASS INDEX: 33 KG/M2

## 2018-02-01 DIAGNOSIS — Q03.9 CONGENITAL HYDROCEPHALUS (HCC): Primary | ICD-10-CM

## 2018-02-01 DIAGNOSIS — G43.009 MIGRAINE WITHOUT AURA AND WITHOUT STATUS MIGRAINOSUS, NOT INTRACTABLE: ICD-10-CM

## 2018-02-01 DIAGNOSIS — Z98.890 STATUS POST LUMBAR SPINE SURGERY FOR DECOMPRESSION OF SPINAL CORD: ICD-10-CM

## 2018-02-01 DIAGNOSIS — R26.9 GAIT DISORDER: ICD-10-CM

## 2018-02-01 DIAGNOSIS — R29.2 BRISK DEEP TENDON REFLEXES: ICD-10-CM

## 2018-02-01 DIAGNOSIS — IMO0001 DISUSE ATROPHY: ICD-10-CM

## 2018-02-01 PROCEDURE — 99245 OFF/OP CONSLTJ NEW/EST HI 55: CPT | Performed by: OTHER

## 2018-02-01 RX ORDER — LORAZEPAM 1 MG/1
TABLET ORAL
Qty: 2 TABLET | Refills: 0 | Status: SHIPPED | OUTPATIENT
Start: 2018-02-01 | End: 2019-01-10

## 2018-02-01 RX ORDER — HYDROCODONE BITARTRATE AND ACETAMINOPHEN 10; 325 MG/1; MG/1
TABLET ORAL DAILY PRN
COMMUNITY
Start: 2017-12-12 | End: 2018-02-22

## 2018-02-01 NOTE — PROGRESS NOTES
[facility]   Neurology- INITIAL CLINIC VISIT  2018, 3:25 PM     Leonela Arzate Patient Status:  No patient class for patient encounter    10/3/1955 MRN [id number]   Location [address] [place] [address] Impacted cerumen    • Obesity    • NAYLA (obstructive sleep apnea) PSG 10-01-15    AHI 20 Sao2 Jamaal 80%   • Osteoarthrosis, unspecified whether generalized or localized, unspecified site    • Other and unspecified hyperlipidemia    • PONV (postoperative junie Tab, TAKE 1 TABLET (10 MG TOTAL) BY MOUTH 3 TIMES DAILY AS NEEDED FOR MUSCLE SPASMS, Disp: 45 tablet, Rfl: 0  •  omeprazole 20 MG Oral Capsule Delayed Release, Take 1 capsule (20 mg total) by mouth once daily. , Disp: 90 capsule, Rfl: 3  •  Lisinopril-Hydro rashes or other skin lesions. Musculoskeletal: There is no scoliosis, or joint deformities  Neurologic examination:  Mental status: Patient is alert, attentive, and oriented x 3. Language is coherent and fluent without aphasia.  Memory, comprehension and a different headache type that recurs or is severe, I do not think she needs a repeat brain image. Headache in 11/2017 was consistent with migraine.  Discussed migraine triggers and treatment approach, and the need to avoid overusing abortive medications, dena

## 2018-02-01 NOTE — PATIENT INSTRUCTIONS
Refill policies:    • Allow 2-3 business days for refills; controlled substances may take longer.   • Contact your pharmacy at least 5 days prior to running out of medication and have them send an electronic request or submit request through the [facility] recommended that you have a procedure or additional testing performed. [facility]) will contact your insurance carrier to obtain pre-certification or prior authorization.     Unfortunately, [facility] has seen an increase in denial of paym

## 2018-02-01 NOTE — PROGRESS NOTES
Pt here for evaluation of balance problems. Pt reports she has had issues all her life. She is currently undergoing PT for recent back surgery, and her therapist recommended evaluation. Since back surgery her balance has gotten worse.

## 2018-02-02 ENCOUNTER — TELEPHONE (OUTPATIENT)
Dept: NEUROLOGY | Facility: CLINIC | Age: 63
End: 2018-02-02

## 2018-02-02 NOTE — TELEPHONE ENCOUNTER
Noted Lorazepam ordered by Dr Macy Pelaez yesterday 2/1/18 to [facility]. No documentation of script being faxed. Called in script for Lorazepam to Damian Pineda, pharmacist @ [facility] with readback. Patient notified of above.  She verbal

## 2018-02-08 DIAGNOSIS — I10 ESSENTIAL HYPERTENSION, BENIGN: ICD-10-CM

## 2018-02-08 DIAGNOSIS — K21.9 GASTROESOPHAGEAL REFLUX DISEASE WITHOUT ESOPHAGITIS: ICD-10-CM

## 2018-02-09 RX ORDER — LISINOPRIL AND HYDROCHLOROTHIAZIDE 12.5; 1 MG/1; MG/1
TABLET ORAL
Qty: 90 TABLET | Refills: 3 | Status: SHIPPED | OUTPATIENT
Start: 2018-02-09 | End: 2018-02-22

## 2018-02-09 RX ORDER — OMEPRAZOLE 20 MG/1
CAPSULE, DELAYED RELEASE ORAL
Qty: 90 CAPSULE | Refills: 0 | Status: SHIPPED | OUTPATIENT
Start: 2018-02-09 | End: 2019-01-10

## 2018-02-09 NOTE — TELEPHONE ENCOUNTER
Lisinopril passed 2/3 protocol checks other med not on protocol sent omeprazole to DR Solis Guzman to approve

## 2018-02-13 ENCOUNTER — HOSPITAL ENCOUNTER (OUTPATIENT)
Dept: MRI IMAGING | Facility: HOSPITAL | Age: 63
Discharge: HOME | End: 2018-02-13
Attending: Other
Payer: COMMERCIAL

## 2018-02-13 DIAGNOSIS — R26.9 GAIT DISORDER: ICD-10-CM

## 2018-02-13 PROCEDURE — 72141 MRI NECK SPINE W/O DYE: CPT | Performed by: OTHER

## 2018-02-19 ENCOUNTER — HOSPITAL ENCOUNTER (OUTPATIENT)
Dept: PHYSICAL THERAPY | Facility: HOSPITAL | Age: 63
Setting detail: THERAPIES SERIES
Discharge: HOME | End: 2018-02-19
Attending: Other
Payer: COMMERCIAL

## 2018-02-19 DIAGNOSIS — IMO0001 DISUSE ATROPHY: ICD-10-CM

## 2018-02-19 DIAGNOSIS — R26.9 GAIT DISORDER: ICD-10-CM

## 2018-02-19 DIAGNOSIS — R29.2 BRISK DEEP TENDON REFLEXES: ICD-10-CM

## 2018-02-19 PROCEDURE — 97163 PT EVAL HIGH COMPLEX 45 MIN: CPT

## 2018-02-19 PROCEDURE — 97112 NEUROMUSCULAR REEDUCATION: CPT

## 2018-02-19 NOTE — PROGRESS NOTES
FALL SCREEN EVALUATION   Referring Physician: Dr. Aster Ferrell  Diagnosis: Gait disorder, Disuse atrophy, Brisk deep tendon reflexes     Date of Service: 2/19/2018     PATIENT SUMMARY   Kayta Arcos is a 58year old female who presents to therapy today w of recurrent falls and would like to improve balance and functional mobility level. Patient reports pain level moderate in her low back and knees. History of L4-5 fusion 2017 and knee replacements in 2007 and 2014.  States in general is able to relieve localized, unspecified site    • Other and unspecified hyperlipidemia    • PONV (postoperative nausea and vomiting)    • Unspecified essential hypertension    • Visual impairment     glasses       ASSESSMENT  Serena Garcia is a 58year old female who presents for Stance: 3 sec  Fall Risk: yes   - SLS: R 1 sec, L 1 sec  Fall Risk: yes  [Full tandem stance <10 sec indicates increased risk of falling]  Age appropriate norms for SLS: 61-75 y/o mean = 27.0 sec          Functional Mobility:  30 sec sit<>stand: 6   Fall R walking speed, or accomplishes a change in speed with significant gait deviations, or changes speed but has significant gait deviations, or changes speed but loses balance but is able to recover and continue walking.  (0)  Severe Impairment: Cannot change shifts, standing marching, mod tandem stance, DLS feet together EC    PT Eval High Complexity, NR x 1      Total Timed Treatment: 15 min     Total Treatment Time: 45 min     PLAN OF CARE:    Goals: (to be met in 10 visits)  1.  Patient will demonstrate impr therapist: Iam Stock, PT, DPT    [de-identified] certification required: Yes  I certify the need for these services furnished under this plan of treatment and while under my care.     X___________________________________________________ Date_________________

## 2018-02-21 ENCOUNTER — HOSPITAL ENCOUNTER (OUTPATIENT)
Dept: PHYSICAL THERAPY | Facility: HOSPITAL | Age: 63
Setting detail: THERAPIES SERIES
Discharge: HOME | End: 2018-02-21
Attending: Other
Payer: COMMERCIAL

## 2018-02-21 PROCEDURE — 97110 THERAPEUTIC EXERCISES: CPT

## 2018-02-21 PROCEDURE — 97112 NEUROMUSCULAR REEDUCATION: CPT

## 2018-02-21 NOTE — PROGRESS NOTES
Dx: Gait disorder, Disuse atrophy, Brisk deep tendon reflexes           Authorized # of Visits:  10 requested [facility])         Next MD visit: none scheduled  Fall Risk: HIGH         Precautions: Fall Risk             Subjective: Patient states nothing new to TX#: 4/ Date:               TX#: 5/ Date:               TX#: 6/ Date:               TX#: 7/ Date:               TX#: 8/   NuStep L6 x 5 min         Airex BUEs attempted fade to 1UE  - step ups x 20 R/L  - side step up/over and over/back x 15 R/L  - fwd wt

## 2018-02-22 ENCOUNTER — OFFICE VISIT (OUTPATIENT)
Dept: FAMILY MEDICINE CLINIC | Facility: CLINIC | Age: 63
End: 2018-02-22

## 2018-02-22 VITALS
SYSTOLIC BLOOD PRESSURE: 126 MMHG | HEART RATE: 84 BPM | HEIGHT: 66 IN | TEMPERATURE: 97 F | BODY MASS INDEX: 39.53 KG/M2 | RESPIRATION RATE: 16 BRPM | DIASTOLIC BLOOD PRESSURE: 66 MMHG | WEIGHT: 246 LBS

## 2018-02-22 DIAGNOSIS — Z12.31 VISIT FOR SCREENING MAMMOGRAM: ICD-10-CM

## 2018-02-22 DIAGNOSIS — I10 ESSENTIAL HYPERTENSION, BENIGN: Primary | ICD-10-CM

## 2018-02-22 DIAGNOSIS — E78.5 HYPERLIPIDEMIA, UNSPECIFIED HYPERLIPIDEMIA TYPE: ICD-10-CM

## 2018-02-22 DIAGNOSIS — R73.9 HYPERGLYCEMIA: ICD-10-CM

## 2018-02-22 DIAGNOSIS — K21.9 GASTROESOPHAGEAL REFLUX DISEASE WITHOUT ESOPHAGITIS: ICD-10-CM

## 2018-02-22 DIAGNOSIS — Z12.11 COLON CANCER SCREENING: ICD-10-CM

## 2018-02-22 PROCEDURE — 99214 OFFICE O/P EST MOD 30 MIN: CPT | Performed by: FAMILY MEDICINE

## 2018-02-22 RX ORDER — BUPROPION HYDROCHLORIDE 150 MG/1
150 TABLET ORAL DAILY
Qty: 90 TABLET | Refills: 3 | Status: SHIPPED | OUTPATIENT
Start: 2018-02-22 | End: 2019-01-10

## 2018-02-22 RX ORDER — LISINOPRIL AND HYDROCHLOROTHIAZIDE 12.5; 1 MG/1; MG/1
1 TABLET ORAL
Qty: 90 TABLET | Refills: 3 | Status: SHIPPED | OUTPATIENT
Start: 2018-02-22 | End: 2018-10-11

## 2018-02-22 RX ORDER — 1.1% SODIUM FLUORIDE PRESCRIPTION DENTAL CREAM 5 MG/G
CREAM DENTAL
Refills: 3 | COMMUNITY
Start: 2018-02-20 | End: 2019-06-19

## 2018-02-22 NOTE — PROGRESS NOTES
Nimco España is a 58year old female. HPI:   Patient presents for a medication follow up. HTN:  Treated with Lisinopril-HCTZ 10-12.5mg daily. BP controlled. She was seeing [facility]. Still taking wellbutrin and metformin.   Off phentermine and T hypertension, benign  BP controlled. CPM  - Lisinopril-Hydrochlorothiazide 10-12.5 MG Oral Tab; Take 1 tablet by mouth once daily. Dispense: 90 tablet; Refill: 3    2. Colon cancer screening  Needs colon  - EVALUATE & TREAT, GASTRO (INTERNAL)    3.  Visit

## 2018-02-26 ENCOUNTER — HOSPITAL ENCOUNTER (OUTPATIENT)
Dept: PHYSICAL THERAPY | Facility: HOSPITAL | Age: 63
Setting detail: THERAPIES SERIES
Discharge: HOME | End: 2018-02-26
Attending: Other
Payer: COMMERCIAL

## 2018-02-26 PROCEDURE — 97110 THERAPEUTIC EXERCISES: CPT

## 2018-02-26 PROCEDURE — 97112 NEUROMUSCULAR REEDUCATION: CPT

## 2018-02-26 NOTE — PROGRESS NOTES
Dx: Gait disorder, Disuse atrophy, Brisk deep tendon reflexes           Authorized # of Visits:  10 requested [facility])         Next MD visit: none scheduled  Fall Risk: HIGH         Precautions: Fall Risk             Subjective: Nothing new to report today, e Patient will be independent and compliant in HEP to maintain progress made in PT. - issued and progressed    Plan: Continue PT with balance and strength progression as indicated/tolerated. Continue fade use of UEs and transition out of // bars.  Large amp w Treatment: 45 min  Total Treatment Time: 45 min

## 2018-02-28 ENCOUNTER — HOSPITAL ENCOUNTER (OUTPATIENT)
Dept: PHYSICAL THERAPY | Facility: HOSPITAL | Age: 63
Setting detail: THERAPIES SERIES
Discharge: HOME | End: 2018-02-28
Attending: Other
Payer: COMMERCIAL

## 2018-02-28 PROCEDURE — 97110 THERAPEUTIC EXERCISES: CPT

## 2018-02-28 PROCEDURE — 97112 NEUROMUSCULAR REEDUCATION: CPT

## 2018-02-28 NOTE — PROGRESS NOTES
Dx: Gait disorder, Disuse atrophy, Brisk deep tendon reflexes           Authorized # of Visits:  10 requested [facility])         Next MD visit: none scheduled  Fall Risk: HIGH         Precautions: Fall Risk             Subjective: Patient reports she was pretty young grandson. - progress  5. Patient will be independent and compliant in HEP to maintain progress made in PT. - issued and progressed    Plan: Continue PT with balance and strength progression as indicated/tolerated.  Continue fade use of UEs and transit laps Walking with large amp steps, cued big in // bars, min to no use UEs x 8 laps       Stepping over 2 hurdles BUEs with VC for hip flex x 6 sets Stepping over 2 hurdles BUEs with VC for hip flex x 6 sets -       Skilled Services:  NR LE proprioceptive t

## 2018-03-05 ENCOUNTER — TELEPHONE (OUTPATIENT)
Dept: NEUROLOGY | Facility: CLINIC | Age: 63
End: 2018-03-05

## 2018-03-05 ENCOUNTER — HOSPITAL ENCOUNTER (OUTPATIENT)
Dept: PHYSICAL THERAPY | Facility: HOSPITAL | Age: 63
Setting detail: THERAPIES SERIES
Discharge: HOME | End: 2018-03-05
Attending: Other
Payer: COMMERCIAL

## 2018-03-05 PROCEDURE — 97112 NEUROMUSCULAR REEDUCATION: CPT

## 2018-03-05 PROCEDURE — 97110 THERAPEUTIC EXERCISES: CPT

## 2018-03-05 RX ORDER — SUMATRIPTAN 25 MG/1
25 TABLET, FILM COATED ORAL EVERY 2 HOUR PRN
Qty: 9 TABLET | Refills: 0 | Status: SHIPPED | OUTPATIENT
Start: 2018-03-05 | End: 2018-04-04

## 2018-03-05 NOTE — PROGRESS NOTES
Dx: Gait disorder, Disuse atrophy, Brisk deep tendon reflexes           Authorized # of Visits:  10 requested [facility])         Next MD visit: none scheduled  Fall Risk: HIGH         Precautions: Fall Risk             Subjective: Patient reports that she has n strength progression as indicated/tolerated. Continue large amp walking out of // bars. Perturbations in stance, progressing to in gait.    Date: 2/21/2018 TX#: 2/10 Date: 2/26/18    TX#: 3/10   Date: 2/28/18   TX#: 4/10 Date: 3/5/18      TX#: 5/10 Date: cones (3) fading to 1UE 2 x 8 sets ea SLS tap to cones (3) fading to 1UE 2 x 8 sets ea      Walking in // bars with inc step length, sight cues x 6 laps Walking in // bars with inc step length, sight cues x 6 laps Walking with large amp steps, cued big in

## 2018-03-05 NOTE — TELEPHONE ENCOUNTER
Spoke with patient and relayed message from Dr. Vinny Haskins, and that Imitrex Rx was sent to pharmacy. Provided proper triptan instructions. Pt verbalized understanding, agrees to plan and expresses intent to comply with advice given.   Answered all question

## 2018-03-05 NOTE — TELEPHONE ENCOUNTER
Pt calling to report that she had a migraine from [address] [place] [address]. She was taking Tyenol with little relief. Also tried ibuprofen, aleve, and Excedrin. She is feeling better now, with only a small headache now.   Pt wondering if something can be prescribed for

## 2018-03-07 ENCOUNTER — HOSPITAL ENCOUNTER (OUTPATIENT)
Dept: PHYSICAL THERAPY | Facility: HOSPITAL | Age: 63
Setting detail: THERAPIES SERIES
Discharge: HOME | End: 2018-03-07
Attending: Other
Payer: COMMERCIAL

## 2018-03-07 PROCEDURE — 97110 THERAPEUTIC EXERCISES: CPT

## 2018-03-07 PROCEDURE — 97112 NEUROMUSCULAR REEDUCATION: CPT

## 2018-03-07 NOTE — PROGRESS NOTES
Dx: Gait disorder, Disuse atrophy, Brisk deep tendon reflexes           Authorized # of Visits:  10 requested [facility])         Next MD visit: none scheduled  Fall Risk: HIGH         Precautions: Fall Risk             Subjective: Patient reports that she fell progression as indicated/tolerated. Continue large amp walking out of // bars. Perturbations in stance, progressing to in gait. Progress to step response during falls and getting up from floor.   Date: 2/21/2018 TX#: 2/10 Date: 2/26/18    TX#: 3/10   Date: x 25 Rockerboard 1UE (improving)  - AP x 25  - ML x 25 -     Airex DLS feet close SBA 3 x 30s Airex DLS SBA  - HT R/L x 15  - HT up/down 1UE x 15 R/L HT R/L and up/down with 1UE, airex as able (with poor tolerance this date) Airex DLS SBA  - HT R/L x 15  -

## 2018-03-08 ENCOUNTER — TELEPHONE (OUTPATIENT)
Dept: FAMILY MEDICINE CLINIC | Facility: CLINIC | Age: 63
End: 2018-03-08

## 2018-03-08 NOTE — TELEPHONE ENCOUNTER
Pt gave me physical form to complete at her LOV 2/22/18. I am waiting for her to have her labs done so I can complete the form.

## 2018-03-12 ENCOUNTER — HOSPITAL ENCOUNTER (OUTPATIENT)
Dept: PHYSICAL THERAPY | Facility: HOSPITAL | Age: 63
Setting detail: THERAPIES SERIES
Discharge: HOME | End: 2018-03-12
Attending: Other
Payer: COMMERCIAL

## 2018-03-12 PROCEDURE — 97112 NEUROMUSCULAR REEDUCATION: CPT

## 2018-03-12 PROCEDURE — 97110 THERAPEUTIC EXERCISES: CPT

## 2018-03-12 NOTE — PROGRESS NOTES
Dx: Gait disorder, Disuse atrophy, Brisk deep tendon reflexes           Authorized # of Visits:  10 requested [facility])         Next MD visit: none scheduled  Fall Risk: HIGH         Precautions: Fall Risk             Subjective: Patient reports that her walki ambulation. - progress  3. Patient will perform 4-Item DGI with score of 10/12 or greater with least restrictive AD to demonstrate ability to ambulate safely in crowded and busy environments. - progress  4.  Patient will improve functional hip strength to d EC  -DLS feet apart, 2# ball lift OH x 10 Righting reactions, stepping x 8 min  - fwd  - bkwd  (heavy VC throughout for proper performance)    DL HR x 10, 2 x 5 Large amp sit<>stand with VC x 10 - large amp fwd step 1UE x 10 R/L  - large amp side step 1UE over 2 hurdles BUEs with VC for hip flex x 6 sets Stepping over 2 hurdles BUEs with VC for hip flex x 6 sets - TUG x 2  - YTB side step x 2 laps // bars  -YTB side step x 2 laps // bars -    Skilled Services:  KENNY WOLFF proprioceptive training in standing on u

## 2018-03-14 ENCOUNTER — HOSPITAL ENCOUNTER (OUTPATIENT)
Dept: PHYSICAL THERAPY | Facility: HOSPITAL | Age: 63
Setting detail: THERAPIES SERIES
Discharge: HOME | End: 2018-03-14
Attending: Other
Payer: COMMERCIAL

## 2018-03-14 ENCOUNTER — LAB ENCOUNTER (OUTPATIENT)
Dept: LAB | Age: 63
End: 2018-03-14
Attending: FAMILY MEDICINE
Payer: COMMERCIAL

## 2018-03-14 DIAGNOSIS — R73.9 HYPERGLYCEMIA: ICD-10-CM

## 2018-03-14 DIAGNOSIS — E78.5 HYPERLIPIDEMIA, UNSPECIFIED HYPERLIPIDEMIA TYPE: ICD-10-CM

## 2018-03-14 LAB
ALBUMIN SERPL-MCNC: 4.1 G/DL (ref 3.5–4.8)
ALP LIVER SERPL-CCNC: 99 U/L (ref 50–130)
ALT SERPL-CCNC: 22 U/L (ref 14–54)
AST SERPL-CCNC: 21 U/L (ref 15–41)
BILIRUB SERPL-MCNC: 1 MG/DL (ref 0.1–2)
BUN BLD-MCNC: 19 MG/DL (ref 8–20)
CALCIUM BLD-MCNC: 9.8 MG/DL (ref 8.3–10.3)
CHLORIDE: 101 MMOL/L (ref 101–111)
CHOLEST SMN-MCNC: 229 MG/DL (ref ?–200)
CO2: 30 MMOL/L (ref 22–32)
CREAT BLD-MCNC: 1.12 MG/DL (ref 0.55–1.02)
GLUCOSE BLD-MCNC: 106 MG/DL (ref 70–99)
HDLC SERPL-MCNC: 74 MG/DL (ref 45–?)
HDLC SERPL: 3.09 {RATIO} (ref ?–4.44)
LDLC SERPL CALC-MCNC: 139 MG/DL (ref ?–130)
M PROTEIN MFR SERPL ELPH: 8.1 G/DL (ref 6.1–8.3)
NONHDLC SERPL-MCNC: 155 MG/DL (ref ?–130)
POTASSIUM SERPL-SCNC: 4.4 MMOL/L (ref 3.6–5.1)
SODIUM SERPL-SCNC: 139 MMOL/L (ref 136–144)
TRIGL SERPL-MCNC: 78 MG/DL (ref ?–150)
VLDLC SERPL CALC-MCNC: 16 MG/DL (ref 5–40)

## 2018-03-14 PROCEDURE — 80061 LIPID PANEL: CPT | Performed by: FAMILY MEDICINE

## 2018-03-14 PROCEDURE — 97112 NEUROMUSCULAR REEDUCATION: CPT

## 2018-03-14 PROCEDURE — 80053 COMPREHEN METABOLIC PANEL: CPT | Performed by: FAMILY MEDICINE

## 2018-03-14 PROCEDURE — 83036 HEMOGLOBIN GLYCOSYLATED A1C: CPT | Performed by: FAMILY MEDICINE

## 2018-03-14 PROCEDURE — 97110 THERAPEUTIC EXERCISES: CPT

## 2018-03-14 NOTE — PROGRESS NOTES
Dx: Gait disorder, Disuse atrophy, Brisk deep tendon reflexes           Authorized # of Visits:  10 requested [facility])         Next MD visit: none scheduled  Fall Risk: HIGH         Precautions: Fall Risk             Subjective: Patient reports feeling very s participation in ADL such as community ambulation. - progress  3. Patient will perform 4-Item DGI with score of 10/12 or greater with least restrictive AD to demonstrate ability to ambulate safely in crowded and busy environments. - progress  4.  Patient wi Standing wt shifts attempting fade no UEs  - AP x 15  - ML x 15 EC  - DLS feet together 2 x 30s SBA   - DLS feet together, alt UE lift x 10 R/L EC  - DLS feet together 2 x 30s SBA   - DLS feet apart, 2# ball lift OH x 10  - DLS feet together, 2# OH lift In // bars SLS tap to cones (3), 1UE 2 x 8 sets each  SBA SLS tap  to 1 cone, no UE x 8 ea - Negotiating obstacles out of // bars with and without cane CGA, gradually increasing height of obstacle, edu cane use x 6 min   Walking in // bars with inc step le

## 2018-03-15 LAB
EST. AVERAGE GLUCOSE BLD GHB EST-MCNC: 123 MG/DL (ref 68–126)
HBA1C MFR BLD HPLC: 5.9 % (ref ?–5.7)

## 2018-03-19 ENCOUNTER — APPOINTMENT (OUTPATIENT)
Dept: PHYSICAL THERAPY | Facility: HOSPITAL | Age: 63
End: 2018-03-19
Attending: FAMILY MEDICINE
Payer: COMMERCIAL

## 2018-03-21 ENCOUNTER — HOSPITAL ENCOUNTER (OUTPATIENT)
Dept: PHYSICAL THERAPY | Facility: HOSPITAL | Age: 63
Setting detail: THERAPIES SERIES
Discharge: HOME | End: 2018-03-21
Attending: FAMILY MEDICINE
Payer: COMMERCIAL

## 2018-03-21 PROCEDURE — 97112 NEUROMUSCULAR REEDUCATION: CPT

## 2018-03-21 PROCEDURE — 97110 THERAPEUTIC EXERCISES: CPT

## 2018-03-21 NOTE — PROGRESS NOTES
Dx: Gait disorder, Disuse atrophy, Brisk deep tendon reflexes           Authorized # of Visits:  10 requested [facility])         Next MD visit: none scheduled  Fall Risk: HIGH         Precautions: Fall Risk             Subjective: Patient reports feeling very s speed for improved participation in ADL such as community ambulation. - progress  3. Patient will perform 4-Item DGI with score of 10/12 or greater with least restrictive AD to demonstrate ability to ambulate safely in crowded and busy environments.  - prog no hands x 10 R/L   -EC DLS feet apart, 2# ball lift OH x 10 and lateral x 10 Airex  -step ups with 1 UE fading to 0 UE x 15 R/L, SBA, use of visual cues stepping to line (several more in session)   Standing wt shifts attempting fade no UEs  - AP x 15  - M 1UE x 15 R/L Airex DLS SBA  - HT R/L x 15  - HT up/down 1UE x 15 R/L - Bosu  - fwd lunge, attempting fade to 1UE x 15 R/L Bosu  -fwd lunge, 1 UE R/L x 10  -lateral lunge, 1 UE R/L x 10   Alt tap to cone stack, BUE x 20, 1UE x 20 Alt tap to cone stack, BUE feet together EC, large amp sit<>stand     Charges:  Therex x 1, NR x 2       Total Timed Treatment: 45 min  Total Treatment Time: 45 min  This treatment was provided under the direct and constant direction and supervision of a licensed therapist, molly carrillo

## 2018-03-21 NOTE — ADDENDUM NOTE
Encounter addended by: Tiffany Barrera PT on: 3/21/2018 12:24 PM<BR>    Actions taken: Sign clinical note

## 2018-03-26 ENCOUNTER — HOSPITAL ENCOUNTER (OUTPATIENT)
Dept: PHYSICAL THERAPY | Facility: HOSPITAL | Age: 63
Setting detail: THERAPIES SERIES
Discharge: HOME | End: 2018-03-26
Attending: FAMILY MEDICINE
Payer: COMMERCIAL

## 2018-03-26 PROCEDURE — 97110 THERAPEUTIC EXERCISES: CPT

## 2018-03-26 PROCEDURE — 97530 THERAPEUTIC ACTIVITIES: CPT

## 2018-03-26 PROCEDURE — 97112 NEUROMUSCULAR REEDUCATION: CPT

## 2018-03-26 NOTE — PROGRESS NOTES
Progress Summary  Dx: Gait disorder, Disuse atrophy, Brisk deep tendon reflexes           Authorized # of Visits:  10 requested [facility])         Next MD visit: none scheduled  Fall Risk: HIGH         Precautions: Fall Risk    Pt has attended 10, cancelled 0, improving and intervention has been beneficial in decreasing her risk for falls, but feel that she is not yet appropriate for D/C and would benefit from continued PT intervention upon return from her trip.      Objective:  4-Stage Balance Test: Normal: Able to smoothly change walking speed without loss of balance or gait deviation. Shows a significant difference in walking speed between normal, fast and slow speeds.    (2)       Mild Impairment: Is able to change speed but demonstrates mild gait d burke.    Goals: (to be met in 10 visits)  1. Patient will demonstrate improved SLS to >8 seconds ASHLEE to promote safety and decrease risk of falls on uneven surfaces such as grass and return to light gardening. - progress  2.  Patient will perform TUG in <10 furnished under this plan of treatment and while under my care.     X___________________________________________________ Date____________________    Certification From: 4/85/8202  To:6/24/2018                 Date: 2/26/18    TX#: 3/10   Date: 2/28/18   TX# fwd  - bkwd  - fwd/bkwd on airex with pulls  (heavy VC throughout for proper performance) Righting reactions, self stepping x 8 min  - fwd  - bkwd  (heavy VC throughout for proper performance) -   Large amp sit<>stand with VC x 10 - large amp fwd step 1UE out of // bars with cane CGA, gradually increasing height of obstacle, edu cane use x 10 min Negotiating obstacles out of // bars with cane CGA, gradually increasing height of obstacle, edu cane use x 8 min   Walking in // bars with inc step length, sight

## 2018-04-11 ENCOUNTER — HOSPITAL ENCOUNTER (OUTPATIENT)
Dept: PHYSICAL THERAPY | Facility: HOSPITAL | Age: 63
Setting detail: THERAPIES SERIES
Discharge: HOME | End: 2018-04-11
Attending: FAMILY MEDICINE
Payer: COMMERCIAL

## 2018-04-11 ENCOUNTER — APPOINTMENT (OUTPATIENT)
Dept: PHYSICAL THERAPY | Facility: HOSPITAL | Age: 63
End: 2018-04-11
Attending: FAMILY MEDICINE
Payer: COMMERCIAL

## 2018-04-11 PROCEDURE — 97112 NEUROMUSCULAR REEDUCATION: CPT

## 2018-04-11 PROCEDURE — 97530 THERAPEUTIC ACTIVITIES: CPT

## 2018-04-11 NOTE — ADDENDUM NOTE
Encounter addended by: [facility] PT on: 4/11/2018  2:33 PM<BR>    Actions taken: Sign clinical note

## 2018-04-11 NOTE — PROGRESS NOTES
Dx: Gait disorder, Disuse atrophy, Brisk deep tendon reflexes           Authorized # of Visits:  16 requested [facility])         Next MD visit: none scheduled  Fall Risk: HIGH         Precautions: Fall Risk      Subjective:  Patient reports that she hasn't expe 10/12 or greater with least restrictive AD to demonstrate ability to ambulate safely in crowded and busy environments. - progress  4.  Patient will improve functional hip strength to demonstrate ability to ascend/descend perform floor transfers and play wit lift x 10 EC  -DLS feet apart, 2# ball lift OH x 10 EC  -DLS feet apart, 2# ball lift OH x 10 Righting reactions, stepping x 8 min  - fwd  - bkwd  (heavy VC throughout for proper performance) Righting reactions, stepping x 8 min  - fwd  - bkwd  - fwd/bkwd to 1UE 2 x 8 sets ea SLS tap to cones (3) fading to 1UE 2 x 8 sets ea In // bars SLS tap to cones (3), 1UE 2 x 8 sets each  SBA SLS tap  to 1 cone, no UE x 8 ea - Negotiating obstacles out of // bars with and without cane CGA, gradually increasing height o

## 2018-04-13 ENCOUNTER — HOSPITAL ENCOUNTER (OUTPATIENT)
Dept: PHYSICAL THERAPY | Facility: HOSPITAL | Age: 63
Setting detail: THERAPIES SERIES
Discharge: HOME | End: 2018-04-13
Attending: FAMILY MEDICINE
Payer: COMMERCIAL

## 2018-04-13 PROCEDURE — 97112 NEUROMUSCULAR REEDUCATION: CPT

## 2018-04-13 PROCEDURE — 97530 THERAPEUTIC ACTIVITIES: CPT

## 2018-04-13 NOTE — PROGRESS NOTES
Dx: Gait disorder, Disuse atrophy, Brisk deep tendon reflexes           Authorized # of Visits:  16 requested [facility])         Next MD visit: none scheduled  Fall Risk: HIGH         Precautions: Fall Risk      Subjective:  Patient reports that she hasn't fall ambulate safely in crowded and busy environments. - progress  4. Patient will improve functional hip strength to demonstrate ability to ascend/descend perform floor transfers and play with her young grandson. - progress  5.  Patient will be independent and VC throughout for proper performance) Righting reactions, self stepping x 8 min  - fwd  - bkwd  (heavy VC throughout for proper performance) - Airex Pad catch, CGA  -DLS x 10  -romberg stance x 10  -modified tandem x 10 R/L Airex Pad catch, CGA  -romberg s Negotiating obstacles out of // bars with cane CGA, gradually increasing height of obstacle, edu cane use x 10 min Negotiating obstacles out of // bars with cane CGA, gradually increasing height of obstacle, edu cane use x 8 min Negotiating obstacles   -ou

## 2018-04-15 DIAGNOSIS — Z51.81 ENCOUNTER FOR THERAPEUTIC DRUG MONITORING: ICD-10-CM

## 2018-04-15 DIAGNOSIS — E66.01 OBESITY, CLASS III, BMI 40-49.9 (MORBID OBESITY) (HCC): ICD-10-CM

## 2018-04-15 DIAGNOSIS — R73.03 PREDIABETES: ICD-10-CM

## 2018-04-16 RX ORDER — METFORMIN HYDROCHLORIDE 750 MG/1
TABLET, EXTENDED RELEASE ORAL
Qty: 30 TABLET | Refills: 2 | OUTPATIENT
Start: 2018-04-16

## 2018-04-19 ENCOUNTER — TELEPHONE (OUTPATIENT)
Dept: FAMILY MEDICINE CLINIC | Facility: CLINIC | Age: 63
End: 2018-04-19

## 2018-04-19 NOTE — TELEPHONE ENCOUNTER
Left message on answering machine to call triage. Has pt been seen for back pain? Did one of our providers recommend PT? Pt should be seen in our office.

## 2018-04-19 NOTE — TELEPHONE ENCOUNTER
Spoke with pt and SHE DID NOT CALL HERE. Aurea Epworth Her  called. Pt has two sessions left of PT. She is going to wait until May when she has insurance. She wants us to disregard the request from her .     We discussed that she should do the home exercis

## 2018-04-23 ENCOUNTER — TELEPHONE (OUTPATIENT)
Dept: PHYSICAL THERAPY | Facility: HOSPITAL | Age: 63
End: 2018-04-23

## 2018-04-23 NOTE — TELEPHONE ENCOUNTER
Called to follow-up with patient in regards to PT. She called last week to cancel all further appointments for monetary reasons.  She states that she has been doing very well this past week, no falls, and feels she has gotten quite a bit out of PT intervent

## 2018-04-25 ENCOUNTER — APPOINTMENT (OUTPATIENT)
Dept: PHYSICAL THERAPY | Facility: HOSPITAL | Age: 63
End: 2018-04-25
Attending: FAMILY MEDICINE
Payer: COMMERCIAL

## 2018-05-02 ENCOUNTER — APPOINTMENT (OUTPATIENT)
Dept: PHYSICAL THERAPY | Facility: HOSPITAL | Age: 63
End: 2018-05-02
Attending: FAMILY MEDICINE

## 2018-05-09 DIAGNOSIS — K21.9 GASTROESOPHAGEAL REFLUX DISEASE WITHOUT ESOPHAGITIS: ICD-10-CM

## 2018-05-10 NOTE — TELEPHONE ENCOUNTER
LOV 2/22/2018 and at that time, pt was advised to follow up in 6 months.     Future Appointments  Date Time Provider Denisa Guerrero   5/11/2018 2:00 PM Kay Villalpando,  [facility]        Refill request for:      Pending Prescriptions Disp Refill

## 2018-05-11 ENCOUNTER — OFFICE VISIT (OUTPATIENT)
Dept: FAMILY MEDICINE CLINIC | Facility: CLINIC | Age: 63
End: 2018-05-11

## 2018-05-11 VITALS
HEART RATE: 72 BPM | DIASTOLIC BLOOD PRESSURE: 70 MMHG | HEIGHT: 66 IN | SYSTOLIC BLOOD PRESSURE: 132 MMHG | RESPIRATION RATE: 12 BRPM | BODY MASS INDEX: 40.34 KG/M2 | WEIGHT: 251 LBS | TEMPERATURE: 99 F

## 2018-05-11 DIAGNOSIS — J30.2 OTHER SEASONAL ALLERGIC RHINITIS: ICD-10-CM

## 2018-05-11 PROCEDURE — 99213 OFFICE O/P EST LOW 20 MIN: CPT | Performed by: FAMILY MEDICINE

## 2018-05-11 RX ORDER — OMEPRAZOLE 20 MG/1
CAPSULE, DELAYED RELEASE ORAL
Qty: 90 CAPSULE | Refills: 0 | OUTPATIENT
Start: 2018-05-11

## 2018-05-11 RX ORDER — FLUTICASONE PROPIONATE 50 MCG
2 SPRAY, SUSPENSION (ML) NASAL
Qty: 1 BOTTLE | Refills: 5 | Status: SHIPPED | OUTPATIENT
Start: 2018-05-11 | End: 2021-11-04

## 2018-05-11 NOTE — PROGRESS NOTES
Chief Complaint:  Patient presents with: Allergies: Nasal Spray Refilled    HPI:  This is a 58year old female patient presenting for Allergies (Nasal Spray Refilled)    Allergies:  Notes that this year they have been bad.  Has rhinitis and nasal congestio • Other [OTHER] Father    • Heart Disease Mother    • NAYLA [OTHER] Brother       Smoking status: Never Smoker                                                              Smokeless tobacco: Never Used                      Alcohol use:  No               Com mouth daily. Disp:  Rfl:    Red Yeast Rice 600 MG Oral Cap Take 1 capsule by mouth daily. Disp:  Rfl:    Ascorbic Acid (VITAMIN C) 250 MG Oral Tab Take 250 mg by mouth daily.  Disp:  Rfl:    Cholecalciferol (VITAMIN D) 1000 UNITS Oral Cap Take 2 Caps by

## 2018-10-11 ENCOUNTER — TELEPHONE (OUTPATIENT)
Dept: FAMILY MEDICINE CLINIC | Facility: CLINIC | Age: 63
End: 2018-10-11

## 2018-10-11 DIAGNOSIS — I10 ESSENTIAL HYPERTENSION, BENIGN: ICD-10-CM

## 2018-10-11 RX ORDER — LISINOPRIL AND HYDROCHLOROTHIAZIDE 12.5; 1 MG/1; MG/1
1 TABLET ORAL
Qty: 90 TABLET | Refills: 1 | Status: SHIPPED | OUTPATIENT
Start: 2018-10-11 | End: 2019-01-10

## 2018-10-11 NOTE — TELEPHONE ENCOUNTER
Pt is out of town and forgot her Lisiniprol. Asking if ok not to take and if not she will need it called in.  She is in [place] and states there is a [facility] there

## 2018-10-11 NOTE — TELEPHONE ENCOUNTER
Advised pt to call the pharmacy here and have them transfer BP medication to a pharmacy in [place]. Stressed that she not go without her BP medication. Pt verbalized understanding.

## 2018-12-03 DIAGNOSIS — R51.9 NONINTRACTABLE HEADACHE, UNSPECIFIED CHRONICITY PATTERN, UNSPECIFIED HEADACHE TYPE: Primary | ICD-10-CM

## 2018-12-03 NOTE — TELEPHONE ENCOUNTER
LMTCB-patient is due for a follow up. Medication: SUMAtriptan Succinate 25 MG Oral Tab    Date of last refill: Fill this prescription after 58 days. .18 (#9/0)  [place]    Last office visit: 2.1.18  Due back to cl

## 2018-12-03 NOTE — TELEPHONE ENCOUNTER
Patient states that when she saw Dr. Noemi Mccarthy in February that she was prescribed a medication to take for headaches. Patient states that she never took any of the medication and has lost it but wants a refill so she can have it on hand.  Patient does not r

## 2018-12-17 RX ORDER — SUMATRIPTAN 25 MG/1
25 TABLET, FILM COATED ORAL EVERY 2 HOUR PRN
Qty: 9 TABLET | Refills: 0 | OUTPATIENT
Start: 2018-12-17 | End: 2019-01-16

## 2019-01-10 ENCOUNTER — OFFICE VISIT (OUTPATIENT)
Dept: FAMILY MEDICINE CLINIC | Facility: CLINIC | Age: 64
End: 2019-01-10
Payer: COMMERCIAL

## 2019-01-10 VITALS
TEMPERATURE: 98 F | DIASTOLIC BLOOD PRESSURE: 70 MMHG | HEART RATE: 70 BPM | BODY MASS INDEX: 42.11 KG/M2 | RESPIRATION RATE: 16 BRPM | SYSTOLIC BLOOD PRESSURE: 128 MMHG | HEIGHT: 66 IN | WEIGHT: 262 LBS

## 2019-01-10 DIAGNOSIS — Z51.81 ENCOUNTER FOR THERAPEUTIC DRUG MONITORING: ICD-10-CM

## 2019-01-10 DIAGNOSIS — K21.9 GASTROESOPHAGEAL REFLUX DISEASE, ESOPHAGITIS PRESENCE NOT SPECIFIED: ICD-10-CM

## 2019-01-10 DIAGNOSIS — F32.1 CURRENT MODERATE EPISODE OF MAJOR DEPRESSIVE DISORDER WITHOUT PRIOR EPISODE (HCC): ICD-10-CM

## 2019-01-10 DIAGNOSIS — E66.01 OBESITY, CLASS III, BMI 40-49.9 (MORBID OBESITY) (HCC): ICD-10-CM

## 2019-01-10 DIAGNOSIS — I10 ESSENTIAL HYPERTENSION, BENIGN: ICD-10-CM

## 2019-01-10 DIAGNOSIS — F43.29 GRIEF REACTION WITH PROLONGED BEREAVEMENT: ICD-10-CM

## 2019-01-10 DIAGNOSIS — M54.16 BILATERAL LUMBAR RADICULOPATHY: Primary | ICD-10-CM

## 2019-01-10 PROCEDURE — 99214 OFFICE O/P EST MOD 30 MIN: CPT | Performed by: FAMILY MEDICINE

## 2019-01-10 RX ORDER — CYCLOBENZAPRINE HCL 10 MG
TABLET ORAL
Qty: 45 TABLET | Refills: 0 | Status: SHIPPED | OUTPATIENT
Start: 2019-01-10 | End: 2019-02-11

## 2019-01-10 RX ORDER — DULOXETIN HYDROCHLORIDE 60 MG/1
60 CAPSULE, DELAYED RELEASE ORAL
Qty: 90 CAPSULE | Refills: 3 | Status: SHIPPED | OUTPATIENT
Start: 2019-01-10 | End: 2019-02-11

## 2019-01-10 RX ORDER — BUPROPION HYDROCHLORIDE 150 MG/1
150 TABLET ORAL DAILY
Qty: 90 TABLET | Refills: 3 | Status: SHIPPED | OUTPATIENT
Start: 2019-01-10 | End: 2019-02-11

## 2019-01-10 RX ORDER — LISINOPRIL AND HYDROCHLOROTHIAZIDE 12.5; 1 MG/1; MG/1
1 TABLET ORAL
Qty: 90 TABLET | Refills: 1 | Status: SHIPPED | OUTPATIENT
Start: 2019-01-10 | End: 2019-02-11

## 2019-01-10 RX ORDER — RANITIDINE 150 MG/1
150 TABLET ORAL 2 TIMES DAILY
Qty: 60 TABLET | Refills: 3 | Status: SHIPPED | OUTPATIENT
Start: 2019-01-10 | End: 2019-02-11

## 2019-01-10 NOTE — PROGRESS NOTES
Chief Complaint:  Patient presents with:  Knee Pain: x pain on R knee for x 2 days- had knee surgery on 2012  Medication Request: Requesting all meds   Memory Loss: Family notice she is losing memory     HPI:  This is a 61year old female patient presentin (gastroesophageal reflux disease)- as above     Obesity, Class III, BMI 40-49.9 (morbid obesity) ([facility]                 Followed in weight loss clinic.  Previously on topiramate and phentermine     Prediabetes                 Last A1c 5.9%     Obstructive sl Sister         Breast Cancer   • Breast Cancer Sister 48        Twin sister.    • Diabetes Father    • Heart Disease Father    • Other (Other) Father    • Heart Disease Mother    • Other (NAYLA) Brother       Social History    Tobacco Use      Smoking status: (MULTIPLE VITAMINS/WOMENS OR) Take 1 tablet by mouth daily. Disp:  Rfl:    Red Yeast Rice 600 MG Oral Cap Take 1 capsule by mouth daily. Disp:  Rfl:    Ascorbic Acid (VITAMIN C) 250 MG Oral Tab Take 250 mg by mouth daily.  Disp:  Rfl:    Cholecalciferol NAVIGATOR    Grief reaction with prolonged bereavement        Relevant Orders    [address] [employer] the following:  Juliana was seen today for knee pain, medication request and memory loss.     Diagnoses and all orders for this visit:    Bilateral

## 2019-01-16 ENCOUNTER — TELEPHONE (OUTPATIENT)
Dept: FAMILY MEDICINE CLINIC | Facility: CLINIC | Age: 64
End: 2019-01-16

## 2019-01-16 NOTE — TELEPHONE ENCOUNTER
Reviewed LOV on 1/10/19 no list noted, [facility] referral issued,   Dr Sukumar Crain, do you recall the list of names that we can print for patient?

## 2019-01-16 NOTE — TELEPHONE ENCOUNTER
Pt was in to see Dr Angelia Lockwood on 1/10/19. She states that Dr Angelia Lockwood gave her a list of counselors which she misplaced. Do not see in her after visit summary.  Call her back as she will  and ok to leave message

## 2019-01-17 NOTE — TELEPHONE ENCOUNTER
These were provided by Juliana Yo:    [facility]   [address], [place], [place], [address] [phone]     [address], [place], [place], [address]   [phone]     [facility]

## 2019-01-17 NOTE — TELEPHONE ENCOUNTER
LMOM telling [facility] File that the list of counselors she was requesting is available

## 2019-01-17 NOTE — TELEPHONE ENCOUNTER
Spoke with Wanda Nguyen telling her the list of counselors given by Corinne Baker was sent to her MyChart. She verbalized understanding.

## 2019-02-11 ENCOUNTER — TELEPHONE (OUTPATIENT)
Dept: FAMILY MEDICINE CLINIC | Facility: CLINIC | Age: 64
End: 2019-02-11

## 2019-02-11 NOTE — TELEPHONE ENCOUNTER
Pk from Vinny Parsons requesting a call back on Drug Interaction for DULoxetine HCl 60 MG Oral.buPROPion HCl ER, XL, 150  and Cyclobenzaprine HCl 10 MG

## 2019-02-11 NOTE — PROGRESS NOTES
Chief Complaint:  Patient presents with:   Anxiety: F/u- Has not been taking Bupropion due to not receiving Medication from Mail order  Depression: F/u   Orders Call: Due for Mammogram, and Colonoscopy- requesting FIT Cards     HPI:  This is a 61year old f 8/2007    right   • KNEE REPLACEMENT SURGERY  8/2013    left   • KNEE TOTAL REPLACEMENT Left 8/6/2013    Performed by Atul Nunn MD at [facility] 1 LEVEL Bilateral 7/11/2016    Performed by Ghassan Alvarez MD at [address] [place] [address] mouth daily. Disp:  Rfl:    clotrimazole-betamethasone 1-0.05 % External Cream Apply as needed Disp: 45 g Rfl: 1   Probiotic Product (VSL#3) Oral Cap Take 1 capsule by mouth daily.  Disp: 60 capsule Rfl: 3   Calcium Carbonate (CALTRATE 600 OR) Take 1 tablet RaNITidine HCl 150 MG Oral Tab       Other    Obesity, Class III, BMI 40-49.9 (morbid obesity) (HCC)    Relevant Medications    buPROPion HCl ER, XL, 150 MG Oral Tablet 24 Hr      Other Visit Diagnoses     Current moderate episode of major depressive disor

## 2019-05-09 NOTE — PROGRESS NOTES
Chief Complaint:  Patient presents with:   Anxiety: 3 month F/u, Requesting Refills    HPI:  This is a 61year old female patient presenting for Anxiety (3 month F/u, Requesting Refills)    Here to follow up on MDD, anxiety:   Last visit noted some worsenin Initial / Assessment/Plan of Care Note     Baseline Assessment  78 year old admitted 4/1/2019 as Inpatient Rehab with a diagnosis of critical illness myopathy per chart notes.   Prior to admission patient was living with Spouse/significant other and residing at Apartment.  Patient does  have a Power of  for Healthcare.  Document is not activated.  Agent is pt's spouse Tiffanie. Patient’s Primary Care Provider is Benji Hassan MD.     Medical History  Past Medical History:   Diagnosis Date   • Anxiety state, unspecified    • Arthritis     gouty arthritis   • Bilateral cataracts    • BPH (benign prostatic hypertrophy)    • CAD (coronary artery disease) 1999    hx of CABG, stents 2001 and 2007   • Chronic pain     back   • Diabetic retinopathy (CMS/[facility])     diabetic macular edema   • ED (erectile dysfunction)    • Fracture    • Gout    • HLD (hyperlipidemia)    • HTN (hypertension)    • Malignant neoplasm (CMS/[facility])     SKIN BACK- REMOVED   • PONV (postoperative nausea and vomiting)    • Type II or unspecified type diabetes mellitus without mention of complication, not stated as uncontrolled     IDDM   • Urinary tract infection    • Vitreous hemorrhage of right eye (CMS/[facility]) 8/2015       Prior to Admission Status  Functional Status  Ambulation: Cane  Bathing: Independent/Self  Dressing: Independent/Self  Toileting: Independent/Self  Meal Preparation: Significant Other  Shopping: Significant Other  Medication Preparation: Independent/Self  Medication Administration: Independent/Self  Housekeeping: Significant Other  Laundry: Significant Other  Transportation: Independent/Self, Significant Other    Agency/Support  Type of Services Prior to Hospitalization: None  Support Systems: Spouse/Significant other  Home Devices/Equipment: Mobility assist device  Mobility Assist Devices: Cane  Sensory Support Devices: Eyeglasses(for reading)    Current Status  PT Ambulation Tips: Use gait belt, Use walker, Needs minimal  assist, Cues for sequencing, Walk to bathroom  PT Transfer Tips: Use gait belt, Needs minimal assist, Use walker, Cues for sequencing, Up in chair for meals  OT Bathing Tips:    OT Dressing Tips:    OT Toileting Tips:    OT Feeding Tips:    SLP Swallow/Feeding Tips:    SLP Comm/Cog Tips:    Current Mental Status: Cooperative  Stressors:      Insurance  Primary: MEDICARE  Secondary: ANTHEM/BCBS    Barriers to Discharge  Identified Barriers to Discharge/Transition Planning: Assessment/stabilization in progress    Progress Note  Social work following for d/c planning needs while pt is on IRP at [facility]. Writer attended pt's team conference this morning. Per MD and therapy team, pt is progressing well with therapies and the plan is for pt to return home with spouse on 4/10 and the team will re-conference on 4/8 to determine whether pt will need in-home or out pt therapy at d/c. Per PT, pt requires supervision with bed mobility, min assist with transfers and min assist with ambulating 50 ft with a w/w. Steps are N/A. Writer met with pt and spouse this afternoon and updated them on team conference and introduced social work role on IRP. Pt and spouse agree with anticipated d/c date. Pt and spouse confirmed that they live in an apt with no steps to enter and pt was independent with ADL's and pt's spouse did most of the household tasks. Pt used a cane per chart notes. Pt was independent with med mgt and was driving prior to hospitalization. Pt's spouse also drives. Pt and spouse are interested in pt receiving home therapy at d/c through [facility] Pt has had [facility] in the past and liked it. Social work will continue to follow and assist with d/c planning needs.     Plan  SW/CM - Recommendations for Discharge:    PT - Recommendations for Discharge:    OT - Recommendations for Discharge:    SLP - Recommendations for Discharge:    Anticipate patient will need post-hospital services. Necessary services are  posterolateral spinal fusion with L4-L5 decompressive lumbar laminectomy with nerve root decompression      Family History   Problem Relation Age of Onset   • Cancer Sister         Breast Cancer   • Breast Cancer Sister 48        Twin sister.    • Diabetes Take 1 capsule by mouth daily. Disp:  Rfl:    Fructooligosaccharides (FOS) Oral Powder Take 1 Package by mouth daily. Disp:  Rfl:    Multiple Vitamins-Minerals (MULTIPLE VITAMINS/WOMENS OR) Take 1 tablet by mouth daily.    Disp:  Rfl:    Red Yeast Rice available.  Anticipate patient can return to the environment from which patient entered the hospital.   Anticipate patient can provide self-care at discharge.    Refer to / Flowsheet for Goals and objective data.      meds and counseling for now. -     buPROPion HCl ER, XL, 150 MG Oral Tablet 24 Hr; Take 1 tablet (150 mg total) by mouth daily.  -     DULoxetine HCl 60 MG Oral Cap DR Particles; Take 1 capsule (60 mg total) by mouth once daily.     Obesity, Class III, BMI

## 2019-06-10 ENCOUNTER — TELEPHONE (OUTPATIENT)
Dept: FAMILY MEDICINE CLINIC | Facility: CLINIC | Age: 64
End: 2019-06-10

## 2019-06-10 DIAGNOSIS — E66.01 OBESITY, CLASS III, BMI 40-49.9 (MORBID OBESITY) (HCC): ICD-10-CM

## 2019-06-10 DIAGNOSIS — F32.1 CURRENT MODERATE EPISODE OF MAJOR DEPRESSIVE DISORDER WITHOUT PRIOR EPISODE (HCC): ICD-10-CM

## 2019-06-10 RX ORDER — BUPROPION HYDROCHLORIDE 150 MG/1
150 TABLET ORAL DAILY
Qty: 90 TABLET | Refills: 3 | Status: SHIPPED | OUTPATIENT
Start: 2019-06-10 | End: 2021-06-22

## 2019-06-10 RX ORDER — DULOXETIN HYDROCHLORIDE 60 MG/1
60 CAPSULE, DELAYED RELEASE ORAL
Qty: 90 CAPSULE | Refills: 3 | Status: SHIPPED | OUTPATIENT
Start: 2019-06-10 | End: 2021-11-04

## 2019-06-10 NOTE — TELEPHONE ENCOUNTER
Pended medications with new mail order pharmacy.  not on protocol sent to Dr Caren Conteh to approve

## 2019-06-10 NOTE — TELEPHONE ENCOUNTER
Patient called [facility] pharmacy is no longer [facility] is now [facility] [id number] ([phone]) is now almost out of the buPROPion HCl ER, XL, 150 MG Oral Tablet 24 Hr and DULoxetine HCl 60 MG Oral Cap DR Particles asked if can send a refill

## 2019-06-14 NOTE — TELEPHONE ENCOUNTER
Patient is calling stating the [facility] service/[facility] will not fill her medications and told her that they have been trying to contact us for a new script for Bupropion HCI ER  mg and the Duloxetine HCO 60 mg please assist patient.  She ha

## 2019-06-19 ENCOUNTER — OFFICE VISIT (OUTPATIENT)
Dept: INTERNAL MEDICINE CLINIC | Facility: CLINIC | Age: 64
End: 2019-06-19
Payer: COMMERCIAL

## 2019-06-19 ENCOUNTER — LAB ENCOUNTER (OUTPATIENT)
Dept: LAB | Age: 64
End: 2019-06-19
Attending: NURSE PRACTITIONER
Payer: COMMERCIAL

## 2019-06-19 ENCOUNTER — TELEPHONE (OUTPATIENT)
Dept: INTERNAL MEDICINE CLINIC | Facility: CLINIC | Age: 64
End: 2019-06-19

## 2019-06-19 VITALS
SYSTOLIC BLOOD PRESSURE: 152 MMHG | DIASTOLIC BLOOD PRESSURE: 108 MMHG | RESPIRATION RATE: 16 BRPM | WEIGHT: 255 LBS | BODY MASS INDEX: 40.5 KG/M2 | HEART RATE: 96 BPM | HEIGHT: 66.5 IN

## 2019-06-19 DIAGNOSIS — E66.01 CLASS 3 SEVERE OBESITY WITHOUT SERIOUS COMORBIDITY WITH BODY MASS INDEX (BMI) OF 40.0 TO 44.9 IN ADULT, UNSPECIFIED OBESITY TYPE (HCC): ICD-10-CM

## 2019-06-19 DIAGNOSIS — M54.16 BILATERAL LUMBAR RADICULOPATHY: ICD-10-CM

## 2019-06-19 DIAGNOSIS — Z51.81 ENCOUNTER FOR THERAPEUTIC DRUG MONITORING: Primary | ICD-10-CM

## 2019-06-19 DIAGNOSIS — I10 ESSENTIAL HYPERTENSION, BENIGN: ICD-10-CM

## 2019-06-19 DIAGNOSIS — R73.03 PREDIABETES: ICD-10-CM

## 2019-06-19 DIAGNOSIS — Z51.81 ENCOUNTER FOR THERAPEUTIC DRUG MONITORING: ICD-10-CM

## 2019-06-19 DIAGNOSIS — G47.33 OSA (OBSTRUCTIVE SLEEP APNEA): ICD-10-CM

## 2019-06-19 PROBLEM — E66.813 CLASS 3 SEVERE OBESITY WITHOUT SERIOUS COMORBIDITY WITH BODY MASS INDEX (BMI) OF 40.0 TO 44.9 IN ADULT (HCC): Status: ACTIVE | Noted: 2019-06-19

## 2019-06-19 PROBLEM — E66.813 CLASS 3 SEVERE OBESITY WITHOUT SERIOUS COMORBIDITY WITH BODY MASS INDEX (BMI) OF 40.0 TO 44.9 IN ADULT: Status: ACTIVE | Noted: 2019-06-19

## 2019-06-19 PROCEDURE — 83036 HEMOGLOBIN GLYCOSYLATED A1C: CPT | Performed by: NURSE PRACTITIONER

## 2019-06-19 PROCEDURE — 82607 VITAMIN B-12: CPT | Performed by: NURSE PRACTITIONER

## 2019-06-19 PROCEDURE — 80053 COMPREHEN METABOLIC PANEL: CPT | Performed by: NURSE PRACTITIONER

## 2019-06-19 PROCEDURE — 82306 VITAMIN D 25 HYDROXY: CPT | Performed by: NURSE PRACTITIONER

## 2019-06-19 PROCEDURE — 99204 OFFICE O/P NEW MOD 45 MIN: CPT | Performed by: NURSE PRACTITIONER

## 2019-06-19 PROCEDURE — 80061 LIPID PANEL: CPT | Performed by: NURSE PRACTITIONER

## 2019-06-19 NOTE — PATIENT INSTRUCTIONS
PLAN:  Go to the lab for blood work  Follow up with me in 1 month  Schedule follow up appointments:  Dietitian/nutritionist      Please try to work on the following dietary changes:  1.  Drink lots of water and cut down on soda/juice consumption if soda/jui

## 2019-06-19 NOTE — PROGRESS NOTES
HISTORY OF PRESENT ILLNESS  Patient presents with:  Weight Problem: dakota ritchie and June Nicole pt last seen 2017    Setph Jaden is a 61year old female here for follow up with medical weight loss program for the treatment of overweight, obesity, or morbid ob pain (hx of back surgery 2016 and bilat.  Knee surgery 2007 and 2014), arthritis   Migraines/seizures: +migraines   Glaucoma: negative   Depression/anxiety: +anxiety--> managed with medications   Constipation: negative  DVT: negative  Family or personal his 03/14/2018    K 4.4 03/14/2018     03/14/2018    CO2 30.0 03/14/2018     Lab Results   Component Value Date     03/14/2018    A1C 5.9 (H) 03/14/2018     Lab Results   Component Value Date    CHOLEST 229 (H) 03/14/2018    TRIG 78 03/14/2018 facility-administered medications on file prior to visit.      ASSESSMENT/PLAN  (Z51.81) Encounter for therapeutic drug monitoring  (primary encounter diagnosis)  Plan: HEMOGLOBIN W7B, COMP METABOLIC PANEL (14),         LIPID PANEL, VITAMIN B12, VITAMIN D, done phentermine, trokendi    Total face to face time was 35 minutes, more than 50% of the time was spent in counseling and/or coordination of care related to obesity and healthy diet and exercise.     Discussed:  · Counseled on comprehensive weight loss pl vegetables per day)  6. Get a good night of sleep  7. Try to decrease stress in life     Please download apps:  1.  \"My Fitness Pal\" (other option is Lose it)) to help you to monitor daily dietary intake and you will be able to see if you are eating the r

## 2019-06-20 DIAGNOSIS — E55.9 VITAMIN D DEFICIENCY: Primary | ICD-10-CM

## 2019-06-20 RX ORDER — ERGOCALCIFEROL 1.25 MG/1
50000 CAPSULE ORAL WEEKLY
Qty: 8 CAPSULE | Refills: 0 | Status: SHIPPED | OUTPATIENT
Start: 2019-06-20 | End: 2019-08-19

## 2019-06-20 NOTE — PROGRESS NOTES
Your blood work are within normal except:   Elevated blood sugar (a1c) it is in the prediabetes range: 6.1% (which is an increase from 1 yr ago) I recommend avoiding carbs, exercise, and possibly starting a medication, which will help with weight loss, pre

## 2019-06-22 DIAGNOSIS — I10 ESSENTIAL HYPERTENSION, BENIGN: ICD-10-CM

## 2019-06-22 NOTE — TELEPHONE ENCOUNTER
Called and LMOM this medication was filled in Feb 2019 for a year to her mail order.  Does she need this to go to [facility]?

## 2019-06-25 RX ORDER — LISINOPRIL AND HYDROCHLOROTHIAZIDE 12.5; 1 MG/1; MG/1
TABLET ORAL
Qty: 30 TABLET | Refills: 0 | OUTPATIENT
Start: 2019-06-25

## 2019-07-12 ENCOUNTER — APPOINTMENT (OUTPATIENT)
Dept: CT IMAGING | Age: 64
End: 2019-07-12
Attending: FAMILY MEDICINE
Payer: COMMERCIAL

## 2019-07-12 ENCOUNTER — HOSPITAL ENCOUNTER (OUTPATIENT)
Age: 64
Discharge: HOME | End: 2019-07-12
Attending: FAMILY MEDICINE
Payer: COMMERCIAL

## 2019-07-12 VITALS
WEIGHT: 245 LBS | HEART RATE: 88 BPM | SYSTOLIC BLOOD PRESSURE: 128 MMHG | RESPIRATION RATE: 16 BRPM | OXYGEN SATURATION: 97 % | BODY MASS INDEX: 39.37 KG/M2 | TEMPERATURE: 98 F | DIASTOLIC BLOOD PRESSURE: 78 MMHG | HEIGHT: 66 IN

## 2019-07-12 DIAGNOSIS — K11.20 SUBMANDIBULAR GLAND INFLAMMATION: ICD-10-CM

## 2019-07-12 DIAGNOSIS — K11.20 SIALOADENITIS: Primary | ICD-10-CM

## 2019-07-12 LAB
#MXD IC: 0.8 X10ˆ3/UL (ref 0.1–1)
CREAT BLD-MCNC: 1 MG/DL (ref 0.55–1.02)
GLUCOSE BLD-MCNC: 117 MG/DL (ref 70–99)
HCT VFR BLD AUTO: 40.1 % (ref 35–48)
HGB BLD-MCNC: 13 G/DL (ref 12–16)
ISTAT BUN: 31 MG/DL (ref 8–20)
ISTAT CHLORIDE: 101 MMOL/L (ref 101–111)
ISTAT HEMATOCRIT: 40 % (ref 34–50)
ISTAT IONIZED CALCIUM FOR CHEM 8: 1.23 MMOL/L (ref 1.12–1.32)
ISTAT POTASSIUM: 4.4 MMOL/L (ref 3.6–5.1)
ISTAT SODIUM: 138 MMOL/L (ref 136–145)
LYMPHOCYTES # BLD AUTO: 1.1 X10ˆ3/UL (ref 1–4)
LYMPHOCYTES NFR BLD AUTO: 13 %
MCH RBC QN AUTO: 27.8 PG (ref 26–34)
MCHC RBC AUTO-ENTMCNC: 32.4 G/DL (ref 31–37)
MCV RBC AUTO: 85.7 FL (ref 80–100)
MIXED CELL %: 9.5 %
NEUTROPHILS # BLD AUTO: 6.8 X10ˆ3/UL (ref 1.5–7.7)
NEUTROPHILS NFR BLD AUTO: 77.5 %
PLATELET # BLD AUTO: 244 X10ˆ3/UL (ref 150–450)
RBC # BLD AUTO: 4.68 X10ˆ6/UL (ref 3.8–5.3)
WBC # BLD AUTO: 8.7 X10ˆ3/UL (ref 4–11)

## 2019-07-12 PROCEDURE — 85025 COMPLETE CBC W/AUTO DIFF WBC: CPT | Performed by: FAMILY MEDICINE

## 2019-07-12 PROCEDURE — 99204 OFFICE O/P NEW MOD 45 MIN: CPT

## 2019-07-12 PROCEDURE — 80047 BASIC METABLC PNL IONIZED CA: CPT

## 2019-07-12 PROCEDURE — 36415 COLL VENOUS BLD VENIPUNCTURE: CPT

## 2019-07-12 PROCEDURE — 70491 CT SOFT TISSUE NECK W/DYE: CPT | Performed by: FAMILY MEDICINE

## 2019-07-12 PROCEDURE — 99214 OFFICE O/P EST MOD 30 MIN: CPT

## 2019-07-12 RX ORDER — AMOXICILLIN AND CLAVULANATE POTASSIUM 875; 125 MG/1; MG/1
1 TABLET, FILM COATED ORAL 2 TIMES DAILY
Qty: 20 TABLET | Refills: 0 | Status: SHIPPED | OUTPATIENT
Start: 2019-07-12 | End: 2019-07-22

## 2019-07-12 NOTE — ED INITIAL ASSESSMENT (HPI)
Pt presents today with c/o bump under her tongue near the frenulum x a few months. Pt states that for the past 2 days she has been having pain r/t this. Pt denies any fevers.

## 2019-07-12 NOTE — ED PROVIDER NOTES
Patient Seen in: [facility]    History   Patient presents with:  Mouth/Lip Problem    Stated Complaint: CYST UNDER TONGUE/PAIN DOWN NECK X 2 DAYS    HPI    80-year-old female presents for cyst under the tongue and lump left side year/rare    Drug use: No      Review of Systems    Positive for stated complaint: CYST UNDER TONGUE/PAIN DOWN NECK X 2 DAYS  Other systems are as noted in HPI. Constitutional and vital signs reviewed.       All other systems reviewed and negative except a the mouth. IV access was obtained. Cbc and Bmp was normal. CT soft tissue Neck- CONCLUSION:  No oral lesion is seen on this exam in the region of the floor of the mouth or base of the tongue.   Please note that oral mucosal lesions may not be well seen with

## 2019-07-16 ENCOUNTER — TELEPHONE (OUTPATIENT)
Dept: FAMILY MEDICINE CLINIC | Facility: CLINIC | Age: 64
End: 2019-07-16

## 2019-07-16 NOTE — TELEPHONE ENCOUNTER
Patient was seen at urgent care on 7/12 for a bump underneath her tongue. Patient was referred to Dr. Ryder Rojas to follow up with. Patient would like a nurse to confirm that that ENT doctor would be someone we would refer to.

## 2019-07-16 NOTE — TELEPHONE ENCOUNTER
Confirmed with patient that we do send patients to Dr Mustapha Arreola. Patient has no further questions or concerns at this time.

## 2019-07-31 ENCOUNTER — LAB REQUISITION (OUTPATIENT)
Dept: LAB | Facility: HOSPITAL | Age: 64
End: 2019-07-31
Payer: COMMERCIAL

## 2019-07-31 DIAGNOSIS — K11.5 SIALOLITHIASIS: ICD-10-CM

## 2019-07-31 PROCEDURE — 88300 SURGICAL PATH GROSS: CPT | Performed by: OTOLARYNGOLOGY

## 2019-08-19 ENCOUNTER — OFFICE VISIT (OUTPATIENT)
Dept: INTERNAL MEDICINE CLINIC | Facility: CLINIC | Age: 64
End: 2019-08-19
Payer: COMMERCIAL

## 2019-08-19 VITALS
SYSTOLIC BLOOD PRESSURE: 122 MMHG | WEIGHT: 252 LBS | BODY MASS INDEX: 40.02 KG/M2 | HEART RATE: 72 BPM | DIASTOLIC BLOOD PRESSURE: 92 MMHG | HEIGHT: 66.5 IN

## 2019-08-19 DIAGNOSIS — E66.01 CLASS 3 SEVERE OBESITY WITHOUT SERIOUS COMORBIDITY WITH BODY MASS INDEX (BMI) OF 40.0 TO 44.9 IN ADULT, UNSPECIFIED OBESITY TYPE (HCC): ICD-10-CM

## 2019-08-19 DIAGNOSIS — Z51.81 ENCOUNTER FOR THERAPEUTIC DRUG MONITORING: Primary | ICD-10-CM

## 2019-08-19 DIAGNOSIS — R73.03 PREDIABETES: ICD-10-CM

## 2019-08-19 DIAGNOSIS — G47.33 OSA (OBSTRUCTIVE SLEEP APNEA): ICD-10-CM

## 2019-08-19 DIAGNOSIS — I10 ESSENTIAL HYPERTENSION, BENIGN: ICD-10-CM

## 2019-08-19 PROCEDURE — 99213 OFFICE O/P EST LOW 20 MIN: CPT | Performed by: NURSE PRACTITIONER

## 2019-08-19 NOTE — PATIENT INSTRUCTIONS
Keep up the great work! !     PLAN:  Follow up with me in 6-8 weeks  Schedule follow up appointments:  Dietitian/nutritionist      Please try to work on the following dietary changes:  1.  Drink lots of water and cut down on soda/juice consumption if soda/ju

## 2019-08-19 NOTE — PROGRESS NOTES
HISTORY OF PRESENT ILLNESS  Patient presents with:  Weight Check: down 2#    Katherine Gleason is a 61year old female here for follow up with medical weight loss program for the treatment of overweight, obesity, or morbid obesity.  Patient has lost -#2  lb reviewed:   Cardiac disorders: htn  NAYLA  Diabetes: preDM   Thyroid disease: negative  Renal disease: negative   Kidney stones: negative  Liver disease: negative  Joint-related conditions: +lumbar pain (hx of back surgery 2016 and bilat.  Knee surgery 2007 a  (H) 06/19/2019    A1C 6.1 (H) 06/19/2019     Lab Results   Component Value Date    CHOLEST 229 (H) 06/19/2019    TRIG 83 06/19/2019    HDL 78 (H) 06/19/2019     (H) 06/19/2019    VLDL 17 06/19/2019    TCHDLRATIO 3.09 03/14/2018    Raphael visit.     ASSESSMENT/PLAN  (Z51.81) Encounter for therapeutic drug monitoring  (primary encounter diagnosis)    (E66.01,  Z68.41) Class 3 severe obesity without serious comorbidity with body mass index (BMI) of 40.0 to 44.9 in adult, unspecified obesity t skip meals   -Read nutrition labels and keep a food log  -drink a lot of water 65 oz of water per day  - Do not drink your calories (no soda, juice, high calorie coffee drinks, limit alcohol)  - Do not eat late at night  · FITTE: ACSM recommendations (150-

## 2019-09-16 ENCOUNTER — TELEPHONE (OUTPATIENT)
Dept: FAMILY MEDICINE CLINIC | Facility: CLINIC | Age: 64
End: 2019-09-16

## 2019-09-16 NOTE — TELEPHONE ENCOUNTER
Patient dropped off driving placard form she would like completed. Patient will  once completed, please call [phone].  Form in triage

## 2019-09-28 DIAGNOSIS — M54.16 BILATERAL LUMBAR RADICULOPATHY: ICD-10-CM

## 2019-10-02 NOTE — TELEPHONE ENCOUNTER
LOV 5/9/2019     Patient was asked to follow-up in: 6 months    Appointment scheduled: Visit date not found     Refill request for:    Requested Prescriptions     Pending Prescriptions Disp Refills   • Cyclobenzaprine HCl 10 MG Oral Tab [Pharmacy Med Name:

## 2019-10-04 RX ORDER — CYCLOBENZAPRINE HCL 10 MG
TABLET ORAL
Qty: 45 TABLET | Refills: 0 | Status: SHIPPED | OUTPATIENT
Start: 2019-10-04 | End: 2020-08-13

## 2020-01-04 PROBLEM — K63.5 COLON POLYP: Status: ACTIVE | Noted: 2020-01-04

## 2020-03-13 ENCOUNTER — HOSPITAL ENCOUNTER (OUTPATIENT)
Dept: MAMMOGRAPHY | Age: 65
Discharge: HOME | End: 2020-03-13
Attending: OBSTETRICS & GYNECOLOGY
Payer: COMMERCIAL

## 2020-03-13 DIAGNOSIS — Z01.419 ENCOUNTER FOR GYNECOLOGICAL EXAMINATION WITHOUT ABNORMAL FINDING: ICD-10-CM

## 2020-03-13 PROCEDURE — 77063 BREAST TOMOSYNTHESIS BI: CPT | Performed by: OBSTETRICS & GYNECOLOGY

## 2020-03-13 PROCEDURE — 77067 SCR MAMMO BI INCL CAD: CPT | Performed by: OBSTETRICS & GYNECOLOGY

## 2020-03-24 NOTE — PROGRESS NOTES
Pt aware mammogram negative. Pt advised Dr. Mario Blackwell sent my chart   Message with results for pt to review. Pt to call PRN.

## 2020-07-08 ENCOUNTER — OFFICE VISIT (OUTPATIENT)
Dept: FAMILY MEDICINE CLINIC | Facility: CLINIC | Age: 65
End: 2020-07-08
Payer: COMMERCIAL

## 2020-07-08 ENCOUNTER — TELEPHONE (OUTPATIENT)
Dept: FAMILY MEDICINE CLINIC | Facility: CLINIC | Age: 65
End: 2020-07-08

## 2020-07-08 VITALS
DIASTOLIC BLOOD PRESSURE: 92 MMHG | BODY MASS INDEX: 34.53 KG/M2 | OXYGEN SATURATION: 98 % | SYSTOLIC BLOOD PRESSURE: 139 MMHG | RESPIRATION RATE: 20 BRPM | HEIGHT: 67 IN | HEART RATE: 96 BPM | WEIGHT: 220 LBS | TEMPERATURE: 99 F
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
## 2020-07-08 DIAGNOSIS — S61.259A DOG BITE OF FINGER, INITIAL ENCOUNTER: Primary | ICD-10-CM

## 2020-07-08 PROCEDURE — 99213 OFFICE O/P EST LOW 20 MIN: CPT | Performed by: NURSE PRACTITIONER

## 2020-07-08 RX ORDER — AMOXICILLIN AND CLAVULANATE POTASSIUM 875; 125 MG/1; MG/1
1 TABLET, FILM COATED ORAL 2 TIMES DAILY
Qty: 20 TABLET | Refills: 0 | Status: SHIPPED | OUTPATIENT
Start: 2020-07-08 | End: 2020-07-18

## 2020-07-08 NOTE — TELEPHONE ENCOUNTER
Patient reports she was feeding her dog and he accidentally bit her right middle finger 3 days ago. Finger is swollen. She cleaned it well with soap and water at the time of injury. Pt reports clear discharge is coming from puncture.  Recommend patient have

## 2020-07-08 NOTE — PROGRESS NOTES
HPI:    Patient ID: Patricia Chapman is a 59year old female. Dog bite to left third ( middle ) finger. Occurred Monday. Is patient's dog    Animal Bite   This is a new problem. The current episode started in the past 7 days.  The problem occurs [facility] L4-L5 posterolateral spinal fusion with L4-L5 decompressive lumbar laminectomy with nerve root decompression     BP (!) 139/92   Pulse 96   Temp 99.3 °F (37.4 °C) (Tympanic)   Resp 20   Ht 67\"   Wt 220 lb (99.8 kg)   LMP 07/01/2007   SpO2 98%   Breastfee Seasonal                Runny nose   PHYSICAL EXAM:   Physical Exam   Constitutional: She is oriented to person, place, and time. Elderly  female    HENT:   Head: Normocephalic and atraumatic.    Right Ear: External ear normal.   Neck: Normal ran A dog bite can cause a wound deep enough to break the skin. In such cases, the wound is cleaned and sometimes closed.  Wounds would be closed if they are gaping open or in cosmetically important areas such as the face. If the wound is closed, it is usually · If someone’s pet dog has bitten you, it should be kept in a secure area for the next 10 days to watch for signs of illness. (If the pet owner won’t allow this, contact your local animal control center.) Ask to see the pet's vaccination history records.  I Charlie last reviewed this educational content on 8/1/2019  © 7545-8212 [employer] [address]. [address] [facility], [facility] [place]. All rights reserved. This information is not intended as a substitute for professional medical care.  Always follo

## 2020-07-08 NOTE — PATIENT INSTRUCTIONS
Increase oral fluids. Tylenol or Motrin for fever or pain. Keep the area clean and dry and elevated  Remove ring from left hand  Take antibiotic as directed. Antibiotic may take 2-3 days before you see any difference in the way you feel.  Finish the [employer] confined for 10 days as an extra precaution.  In general, if there is a risk for rabies, the following steps may need to be taken:   · If someone’s pet dog has bitten you, it should be kept in a secure area for the next 10 days to watch for signs of illness move any body part near the wound  · Bleeding that can't be stopped after 5 minutes of firm pressure  Charlie last reviewed this educational content on 8/1/2019  © 1008-5023 [employer] [address]. [address] [facility], [facility] [place].  All rights

## 2020-08-13 ENCOUNTER — OFFICE VISIT (OUTPATIENT)
Dept: FAMILY MEDICINE CLINIC | Facility: CLINIC | Age: 65
End: 2020-08-13
Payer: COMMERCIAL

## 2020-08-13 VITALS
DIASTOLIC BLOOD PRESSURE: 78 MMHG | BODY MASS INDEX: 41.46 KG/M2 | HEIGHT: 66 IN | TEMPERATURE: 98 F | SYSTOLIC BLOOD PRESSURE: 120 MMHG | WEIGHT: 258 LBS | RESPIRATION RATE: 16 BRPM | HEART RATE: 78 BPM

## 2020-08-13 DIAGNOSIS — R73.03 PREDIABETES: ICD-10-CM

## 2020-08-13 DIAGNOSIS — G89.29 CHRONIC PAIN OF BOTH KNEES: ICD-10-CM

## 2020-08-13 DIAGNOSIS — M54.16 BILATERAL LUMBAR RADICULOPATHY: ICD-10-CM

## 2020-08-13 DIAGNOSIS — M25.561 CHRONIC PAIN OF BOTH KNEES: ICD-10-CM

## 2020-08-13 DIAGNOSIS — I10 ESSENTIAL HYPERTENSION, BENIGN: Primary | ICD-10-CM

## 2020-08-13 DIAGNOSIS — M25.562 CHRONIC PAIN OF BOTH KNEES: ICD-10-CM

## 2020-08-13 PROCEDURE — 3008F BODY MASS INDEX DOCD: CPT | Performed by: PHYSICIAN ASSISTANT

## 2020-08-13 PROCEDURE — 3078F DIAST BP <80 MM HG: CPT | Performed by: PHYSICIAN ASSISTANT

## 2020-08-13 PROCEDURE — 3074F SYST BP LT 130 MM HG: CPT | Performed by: PHYSICIAN ASSISTANT

## 2020-08-13 PROCEDURE — 99214 OFFICE O/P EST MOD 30 MIN: CPT | Performed by: PHYSICIAN ASSISTANT

## 2020-08-13 RX ORDER — LISINOPRIL AND HYDROCHLOROTHIAZIDE 12.5; 1 MG/1; MG/1
1 TABLET ORAL
Qty: 90 TABLET | Refills: 3 | Status: SHIPPED | OUTPATIENT
Start: 2020-08-13 | End: 2021-05-24

## 2020-08-13 RX ORDER — SUMATRIPTAN 25 MG/1
25 TABLET, FILM COATED ORAL EVERY 2 HOUR PRN
Qty: 9 TABLET | Refills: 0 | Status: SHIPPED | OUTPATIENT
Start: 2020-08-13 | End: 2021-11-04

## 2020-08-13 RX ORDER — CYCLOBENZAPRINE HCL 10 MG
TABLET ORAL
Qty: 45 TABLET | Refills: 0 | Status: SHIPPED | OUTPATIENT
Start: 2020-08-13 | End: 2021-07-28

## 2020-08-16 NOTE — PROGRESS NOTES
Patient presents with:  Arthritis       HISTORY OF PRESENT ILLNESS  Leonela Arzate is a 59year old female who presents for evaluation of few concerns today. Notes that she has been having more pains in her knees and back.  Has had a harder time being ac 1-0.05 % External Cream, Apply as needed, Disp: 45 g, Rfl: 1  •  Fructooligosaccharides (FOS) Oral Powder, Take 1 Package by mouth daily. , Disp: , Rfl:   •  Multiple Vitamins-Minerals (MULTIPLE VITAMINS/WOMENS OR), Take 1 tablet by mouth daily.   , Disp: Alcohol/week: 0.0 standard drinks      Comment: 4 drinks per year/rare    Drug use: No       08/13/20  1342   BP: 120/78   Pulse: 78   Resp: 16   Temp: 97.8 °F (36.6 °C)       PHYSICAL EXAM  GENERAL:  Alert and in no acute distress.   Well groomed and appro

## 2020-09-08 ENCOUNTER — LAB ENCOUNTER (OUTPATIENT)
Dept: LAB | Age: 65
End: 2020-09-08
Attending: PHYSICIAN ASSISTANT
Payer: COMMERCIAL

## 2020-09-08 DIAGNOSIS — I10 ESSENTIAL HYPERTENSION, BENIGN: ICD-10-CM

## 2020-09-08 DIAGNOSIS — R73.03 PREDIABETES: ICD-10-CM

## 2020-09-08 LAB
ALBUMIN SERPL-MCNC: 4 G/DL (ref 3.4–5)
ALBUMIN/GLOB SERPL: 1.1 {RATIO} (ref 1–2)
ALP LIVER SERPL-CCNC: 94 U/L (ref 50–130)
ALT SERPL-CCNC: 26 U/L (ref 13–56)
ANION GAP SERPL CALC-SCNC: 5 MMOL/L (ref 0–18)
AST SERPL-CCNC: 23 U/L (ref 15–37)
BASOPHILS # BLD AUTO: 0.03 X10(3) UL (ref 0–0.2)
BASOPHILS NFR BLD AUTO: 0.7 %
BILIRUB SERPL-MCNC: 0.8 MG/DL (ref 0.1–2)
BUN BLD-MCNC: 30 MG/DL (ref 7–18)
BUN/CREAT SERPL: 24.4 (ref 10–20)
CALCIUM BLD-MCNC: 10.9 MG/DL (ref 8.5–10.1)
CHLORIDE SERPL-SCNC: 103 MMOL/L (ref 98–112)
CHOLEST SMN-MCNC: 216 MG/DL (ref ?–200)
CO2 SERPL-SCNC: 30 MMOL/L (ref 21–32)
CREAT BLD-MCNC: 1.23 MG/DL (ref 0.55–1.02)
DEPRECATED RDW RBC AUTO: 49.5 FL (ref 35.1–46.3)
EOSINOPHIL # BLD AUTO: 0.07 X10(3) UL (ref 0–0.7)
EOSINOPHIL NFR BLD AUTO: 1.7 %
ERYTHROCYTE [DISTWIDTH] IN BLOOD BY AUTOMATED COUNT: 16.1 % (ref 11–15)
EST. AVERAGE GLUCOSE BLD GHB EST-MCNC: 123 MG/DL (ref 68–126)
GLOBULIN PLAS-MCNC: 3.5 G/DL (ref 2.8–4.4)
GLUCOSE BLD-MCNC: 99 MG/DL (ref 70–99)
HBA1C MFR BLD HPLC: 5.9 % (ref ?–5.7)
HCT VFR BLD AUTO: 43 % (ref 35–48)
HDLC SERPL-MCNC: 62 MG/DL (ref 40–59)
HGB BLD-MCNC: 13 G/DL (ref 12–16)
IMM GRANULOCYTES # BLD AUTO: 0.01 X10(3) UL (ref 0–1)
IMM GRANULOCYTES NFR BLD: 0.2 %
LDLC SERPL CALC-MCNC: 138 MG/DL (ref ?–100)
LYMPHOCYTES # BLD AUTO: 1.67 X10(3) UL (ref 1–4)
LYMPHOCYTES NFR BLD AUTO: 39.7 %
M PROTEIN MFR SERPL ELPH: 7.5 G/DL (ref 6.4–8.2)
MCH RBC QN AUTO: 25.5 PG (ref 26–34)
MCHC RBC AUTO-ENTMCNC: 30.2 G/DL (ref 31–37)
MCV RBC AUTO: 84.3 FL (ref 80–100)
MONOCYTES # BLD AUTO: 0.47 X10(3) UL (ref 0.1–1)
MONOCYTES NFR BLD AUTO: 11.2 %
NEUTROPHILS # BLD AUTO: 1.96 X10 (3) UL (ref 1.5–7.7)
NEUTROPHILS # BLD AUTO: 1.96 X10(3) UL (ref 1.5–7.7)
NEUTROPHILS NFR BLD AUTO: 46.5 %
NONHDLC SERPL-MCNC: 154 MG/DL (ref ?–130)
OSMOLALITY SERPL CALC.SUM OF ELEC: 292 MOSM/KG (ref 275–295)
PATIENT FASTING Y/N/NP: YES
PATIENT FASTING Y/N/NP: YES
PLATELET # BLD AUTO: 228 10(3)UL (ref 150–450)
POTASSIUM SERPL-SCNC: 4 MMOL/L (ref 3.5–5.1)
RBC # BLD AUTO: 5.1 X10(6)UL (ref 3.8–5.3)
SODIUM SERPL-SCNC: 138 MMOL/L (ref 136–145)
TRIGL SERPL-MCNC: 79 MG/DL (ref 30–149)
TSI SER-ACNC: 2.04 MIU/ML (ref 0.36–3.74)
VLDLC SERPL CALC-MCNC: 16 MG/DL (ref 0–30)
WBC # BLD AUTO: 4.2 X10(3) UL (ref 4–11)

## 2020-09-08 PROCEDURE — 80050 GENERAL HEALTH PANEL: CPT | Performed by: PHYSICIAN ASSISTANT

## 2020-09-08 PROCEDURE — 80061 LIPID PANEL: CPT | Performed by: PHYSICIAN ASSISTANT

## 2020-09-08 PROCEDURE — 83036 HEMOGLOBIN GLYCOSYLATED A1C: CPT | Performed by: PHYSICIAN ASSISTANT

## 2020-09-09 ENCOUNTER — MED REC SCAN ONLY (OUTPATIENT)
Dept: FAMILY MEDICINE CLINIC | Facility: CLINIC | Age: 65
End: 2020-09-09

## 2020-09-15 ENCOUNTER — TELEPHONE (OUTPATIENT)
Dept: FAMILY MEDICINE CLINIC | Facility: CLINIC | Age: 65
End: 2020-09-15

## 2020-09-17 ENCOUNTER — PATIENT MESSAGE (OUTPATIENT)
Dept: FAMILY MEDICINE CLINIC | Facility: CLINIC | Age: 65
End: 2020-09-17

## 2020-09-18 NOTE — TELEPHONE ENCOUNTER
From: Douglas Amos  To: Donald Rodriguez  Sent: 9/17/2020 10:00 PM CDT  Subject: Other    Harlin Sprain is not here. She asked me to contact you.  She got called away because her mother was put in the hospital. She asked me to notify you that she will not

## 2020-09-23 ENCOUNTER — TELEPHONE (OUTPATIENT)
Dept: FAMILY MEDICINE CLINIC | Facility: CLINIC | Age: 65
End: 2020-09-23

## 2020-09-23 NOTE — TELEPHONE ENCOUNTER
Patient's brother has been exposed last Thursday to [facility] and she saw him Friday. Should she be worried no sx at this time. Concerned because mother just had gall bladder surgery.

## 2020-09-24 NOTE — TELEPHONE ENCOUNTER
Pt states brother was exposed to a positive covid person last week- brother tested negative- brother has no symptoms. Pt has no symptoms. Pt asking if she should ne tested.  Advised no testing recommended- gave Pt information of outside testing sites if s

## 2020-10-07 NOTE — TELEPHONE ENCOUNTER
PA initiated- see Referrals Pain Refusal Text: I offered to prescribe pain medication but the patient refused to take this medication.

## 2020-11-12 DIAGNOSIS — J30.2 OTHER SEASONAL ALLERGIC RHINITIS: ICD-10-CM

## 2020-11-12 NOTE — TELEPHONE ENCOUNTER
Requested Prescriptions     Pending Prescriptions Disp Refills   • FLUTICASONE PROPIONATE 50 MCG/ACT Nasal Suspension [Pharmacy Med Name: Fluticasone Propionate Nasal Suspension 50 MCG/ACT] 16 g 0     Sig: spray 2 sprays by nasal route daily as needed

## 2020-12-12 NOTE — TELEPHONE ENCOUNTER
Pt called back and she has enough of the Rx to get her through till after the first of the year and she will call to make a appt then. She does not want to come in the office and she wasn't interested in a virtual visit.

## 2020-12-14 RX ORDER — FLUTICASONE PROPIONATE 50 MCG
SPRAY, SUSPENSION (ML) NASAL
Qty: 16 G | Refills: 0 | OUTPATIENT
Start: 2020-12-14

## 2021-02-08 ENCOUNTER — OFFICE VISIT (OUTPATIENT)
Dept: FAMILY MEDICINE CLINIC | Facility: CLINIC | Age: 66
End: 2021-02-08
Payer: MEDICARE

## 2021-02-08 VITALS
BODY MASS INDEX: 42.91 KG/M2 | DIASTOLIC BLOOD PRESSURE: 92 MMHG | HEART RATE: 80 BPM | SYSTOLIC BLOOD PRESSURE: 168 MMHG | RESPIRATION RATE: 14 BRPM | HEIGHT: 66 IN | WEIGHT: 267 LBS

## 2021-02-08 DIAGNOSIS — G89.29 CHRONIC RIGHT-SIDED LOW BACK PAIN WITH RIGHT-SIDED SCIATICA: Primary | ICD-10-CM

## 2021-02-08 DIAGNOSIS — M54.41 CHRONIC RIGHT-SIDED LOW BACK PAIN WITH RIGHT-SIDED SCIATICA: Primary | ICD-10-CM

## 2021-02-08 PROCEDURE — 99213 OFFICE O/P EST LOW 20 MIN: CPT | Performed by: FAMILY MEDICINE

## 2021-02-08 PROCEDURE — 3080F DIAST BP >= 90 MM HG: CPT | Performed by: FAMILY MEDICINE

## 2021-02-08 PROCEDURE — 3077F SYST BP >= 140 MM HG: CPT | Performed by: FAMILY MEDICINE

## 2021-02-08 PROCEDURE — 3008F BODY MASS INDEX DOCD: CPT | Performed by: FAMILY MEDICINE

## 2021-02-08 RX ORDER — GABAPENTIN 100 MG/1
100 CAPSULE ORAL 3 TIMES DAILY
Qty: 90 CAPSULE | Refills: 1 | Status: SHIPPED | OUTPATIENT
Start: 2021-02-08 | End: 2021-02-10

## 2021-02-08 NOTE — PROGRESS NOTES
Chief Complaint:  Patient presents with:  Back Pain: Pain has worsen, cramping on Right Buttock to leg     HPI:  This is a 72year old female patient presenting for Back Pain (Pain has worsen, cramping on Right Buttock to leg )    Notes that she hurts all unspecified site    • Other and unspecified hyperlipidemia    • PONV (postoperative nausea and vomiting)    • Unspecified essential hypertension    • Visual impairment     glasses   • Wears glasses       Past Surgical History:   Procedure Laterality Date tablet (150 mg total) by mouth daily. 90 tablet 3   • MetFORMIN HCl  MG Oral Tablet 24 Hr TAKE 1 TABLET DAILY WITH BREAKFAST 90 tablet 3   • Fluticasone Propionate 50 MCG/ACT Nasal Suspension 2 sprays by Nasal route daily as needed.  1 Bottle 5   • Ma following:  Serena Garcia was seen today for back pain. Diagnoses and all orders for this visit:    Chronic right-sided low back pain with right-sided sciatica  Given patient has had 2 back surgeries, will refer back to neurosurgery for evaluation.  To start ga

## 2021-02-08 NOTE — PATIENT INSTRUCTIONS
-Call Dr. Magdy Jerome office to make an appointment as soon as possible.  -Start gabapentin, 1 capsule up to 3x a day for pain  -Make a follow up appointment in the next 1-2 months for your physical

## 2021-02-09 ENCOUNTER — TELEPHONE (OUTPATIENT)
Dept: FAMILY MEDICINE CLINIC | Facility: CLINIC | Age: 66
End: 2021-02-09

## 2021-02-09 DIAGNOSIS — M54.50 NONSPECIFIC PAIN IN THE LUMBAR REGION: Primary | ICD-10-CM

## 2021-02-09 DIAGNOSIS — H57.10 PAIN IN EYE, UNSPECIFIED LATERALITY: Primary | ICD-10-CM

## 2021-02-09 DIAGNOSIS — R73.03 PREDIABETES: ICD-10-CM

## 2021-02-09 NOTE — TELEPHONE ENCOUNTER
Gabino Padgett  P [facility] Clinical Staff    Cc: [facility] Referral Pool   Phone Number: [phone]             .Reason for the order/referral:ORTHO/CONSULT   PCP:JANELLE   Refer to Provider ([facility]   Specialty:ORTHO   Patient Insurance: Payor:

## 2021-02-09 NOTE — TELEPHONE ENCOUNTER
Last office visit post-op Sept 2017 Spinal Fusion and Decompression L4-L5 w/ Dr Marjorie Allen, Keiry Mccormack  Office visit w/Dr Pao York on 02/08/2021  HPI:  This is a 72year old female patient presenting for Back Pain (Pain has worsen, cramping on Right But

## 2021-02-09 NOTE — TELEPHONE ENCOUNTER
Received:  Today  Message Contents   Homero Maravilla [facility] Clinical Staff    Cc: [facility] Referral Pool   Phone Number: [phone]             .Reason for the order/referral:OPHTHALMOLOGY/ANNUAL EYE EXAM   PCP: [facility]   Refer to Provider

## 2021-02-10 ENCOUNTER — TELEPHONE (OUTPATIENT)
Dept: FAMILY MEDICINE CLINIC | Facility: CLINIC | Age: 66
End: 2021-02-10

## 2021-02-10 ENCOUNTER — OFFICE VISIT (OUTPATIENT)
Dept: FAMILY MEDICINE CLINIC | Facility: CLINIC | Age: 66
End: 2021-02-10
Payer: MEDICARE

## 2021-02-10 ENCOUNTER — HOSPITAL ENCOUNTER (OUTPATIENT)
Dept: ULTRASOUND IMAGING | Age: 66
Discharge: HOME | End: 2021-02-10
Attending: PHYSICIAN ASSISTANT
Payer: MEDICARE

## 2021-02-10 VITALS
DIASTOLIC BLOOD PRESSURE: 90 MMHG | SYSTOLIC BLOOD PRESSURE: 152 MMHG | HEART RATE: 97 BPM | BODY MASS INDEX: 43 KG/M2 | TEMPERATURE: 96 F | HEIGHT: 66 IN

## 2021-02-10 DIAGNOSIS — M79.604 RIGHT LEG PAIN: ICD-10-CM

## 2021-02-10 DIAGNOSIS — M43.10 ACQUIRED SPONDYLOLISTHESIS: ICD-10-CM

## 2021-02-10 DIAGNOSIS — M79.604 RIGHT LEG PAIN: Primary | ICD-10-CM

## 2021-02-10 PROBLEM — N18.30 CKD (CHRONIC KIDNEY DISEASE) STAGE 3, GFR 30-59 ML/MIN (HCC): Chronic | Status: ACTIVE | Noted: 2021-02-10

## 2021-02-10 PROBLEM — F32.1 CURRENT MODERATE EPISODE OF MAJOR DEPRESSIVE DISORDER WITHOUT PRIOR EPISODE (HCC): Status: ACTIVE | Noted: 2021-02-10

## 2021-02-10 PROCEDURE — 3080F DIAST BP >= 90 MM HG: CPT | Performed by: PHYSICIAN ASSISTANT

## 2021-02-10 PROCEDURE — 93971 EXTREMITY STUDY: CPT | Performed by: PHYSICIAN ASSISTANT

## 2021-02-10 PROCEDURE — 3077F SYST BP >= 140 MM HG: CPT | Performed by: PHYSICIAN ASSISTANT

## 2021-02-10 PROCEDURE — 3008F BODY MASS INDEX DOCD: CPT | Performed by: PHYSICIAN ASSISTANT

## 2021-02-10 PROCEDURE — 99214 OFFICE O/P EST MOD 30 MIN: CPT | Performed by: PHYSICIAN ASSISTANT

## 2021-02-10 RX ORDER — PREGABALIN 25 MG/1
25 CAPSULE ORAL 2 TIMES DAILY
Qty: 60 CAPSULE | Refills: 0 | Status: SHIPPED | OUTPATIENT
Start: 2021-02-10 | End: 2021-11-04

## 2021-02-10 NOTE — TELEPHONE ENCOUNTER
Received call at approx 2130 last night  from patient's , Sola Shaw, while on call. Stated patient has recently started Gabapentin and yesterday was very drowsy and serrano a brief period a little disoriented.  Stated her legs also were weak and he had to [employer]

## 2021-02-12 NOTE — PROGRESS NOTES
Patient presents with:  Medication Problem: started [facility] , pt collapsed last night, like if she lost muscle strenght . sleepy .  pt is feeling better today        HISTORY OF PRESENT ILLNESS  Erum Pitt is a 72year old female who presents Particles, Take 1 capsule (60 mg total) by mouth once daily. , Disp: 90 capsule, Rfl: 3  •  buPROPion HCl ER, XL, 150 MG Oral Tablet 24 Hr, Take 1 tablet (150 mg total) by mouth daily. , Disp: 90 tablet, Rfl: 3  •  MetFORMIN HCl  MG Oral Tablet 24 Hr, 40.0 to 44.9 in adult [facility])     Colon polyp     Current moderate episode of major depressive disorder without prior episode (HCC)     CKD (chronic kidney disease) stage 3, GFR 30-59 ml/min (HCC)      Past Surgical History:   Procedure Laterality Date   • B documentation.

## 2021-02-24 DIAGNOSIS — E66.01 OBESITY, CLASS III, BMI 40-49.9 (MORBID OBESITY) (HCC): ICD-10-CM

## 2021-02-24 DIAGNOSIS — Z51.81 ENCOUNTER FOR THERAPEUTIC DRUG MONITORING: ICD-10-CM

## 2021-02-24 DIAGNOSIS — R73.03 PREDIABETES: ICD-10-CM

## 2021-02-24 RX ORDER — METFORMIN HYDROCHLORIDE 750 MG/1
TABLET, EXTENDED RELEASE ORAL
Qty: 90 TABLET | Refills: 1 | Status: SHIPPED | OUTPATIENT
Start: 2021-02-24 | End: 2022-01-03

## 2021-02-24 NOTE — TELEPHONE ENCOUNTER
Requested Prescriptions     Pending Prescriptions Disp Refills   • metFORMIN HCl  MG Oral Tablet 24 Hr 90 tablet 3     Sig: TAKE 1 TABLET DAILY WITH BREAKFAST     LOV 2/10/2021     Patient was asked to follow-up in: Follow-up not documented in note

## 2021-03-09 DIAGNOSIS — Z23 NEED FOR VACCINATION: ICD-10-CM

## 2021-03-22 ENCOUNTER — TELEPHONE (OUTPATIENT)
Dept: SURGERY | Facility: CLINIC | Age: 66
End: 2021-03-22

## 2021-03-22 NOTE — TELEPHONE ENCOUNTER
Patient was called to discuss following up she had a referral from her primary care physician to see us. Referral was from February 8 of 2021. She was seen in 2017 by Jocelyne Johnson and Dr. Duff.     Patient was called and voicemail left for her to call keri

## 2021-03-25 ENCOUNTER — TELEPHONE (OUTPATIENT)
Dept: FAMILY MEDICINE CLINIC | Facility: CLINIC | Age: 66
End: 2021-03-25

## 2021-03-25 ENCOUNTER — TELEPHONE (OUTPATIENT)
Dept: ADMINISTRATIVE | Age: 66
End: 2021-03-25

## 2021-03-25 DIAGNOSIS — Z12.31 ENCOUNTER FOR SCREENING MAMMOGRAM FOR BREAST CANCER: Primary | ICD-10-CM

## 2021-03-25 NOTE — TELEPHONE ENCOUNTER
Certificate Information   Certification Number  [id number]  Status  CERTIFIED IN TOTAL  Message  Preauthorization is a determination of medical necessity, not a guarantee of payment. Payment is subject to the member's coverage.  Charges by out of network pro

## 2021-03-29 ENCOUNTER — TELEPHONE (OUTPATIENT)
Dept: FAMILY MEDICINE CLINIC | Facility: CLINIC | Age: 66
End: 2021-03-29

## 2021-03-29 NOTE — TELEPHONE ENCOUNTER
Pt states she got her COVID  Vaccine 3 weeks ago and her arm is still swollen and sore. Pt wants to know if this is normal or should she be concerned.

## 2021-03-29 NOTE — TELEPHONE ENCOUNTER
My wife, Melanie Organ - [id number] has not activated her version of My Chart. She got the second Graves Peter vaccine shot on 3-17-21.   Since then she has had a variety of pains in her left arm and it recently has moved from the injection site to a general coleman

## 2021-03-29 NOTE — TELEPHONE ENCOUNTER
Amber Simeon         3/29/21 10:48 AM  Note     Pt states she got her COVID  Vaccine 3 weeks ago and her arm is still swollen and sore. Pt wants to know if this is normal or should she be concerned.

## 2021-03-29 NOTE — TELEPHONE ENCOUNTER
Pt states received 2nd covid vaccine on 3/17/21 and arm still sore/achy. No redness, no rash, no swelling, able to move arm freely. Advised heat, rest, stretching and continue to assess.  Any change to keep us updated

## 2021-04-26 ENCOUNTER — TELEPHONE (OUTPATIENT)
Dept: FAMILY MEDICINE CLINIC | Facility: CLINIC | Age: 66
End: 2021-04-26

## 2021-04-26 NOTE — TELEPHONE ENCOUNTER
Pt requesting her 90 day refill for buPROPion HCl ER, XL, 150 MG sent to her [facility]. (per pt [facility] never received)

## 2021-04-26 NOTE — TELEPHONE ENCOUNTER
LOV 2/8/2021 and depression was not addressed at that time. Last office visit for depression was 5/9/2019.     Future Appointments   Date Time Provider Denisa Guerrero   4/26/2021  1:00 PM [address] MELLISA Woodson   7/8/2021 11:00 AM Nolan Payan MD

## 2021-04-27 ENCOUNTER — HOSPITAL ENCOUNTER (OUTPATIENT)
Dept: MAMMOGRAPHY | Age: 66
Discharge: HOME | End: 2021-04-27
Attending: FAMILY MEDICINE
Payer: MEDICARE

## 2021-04-27 DIAGNOSIS — Z12.31 ENCOUNTER FOR SCREENING MAMMOGRAM FOR BREAST CANCER: ICD-10-CM

## 2021-04-27 PROCEDURE — 77063 BREAST TOMOSYNTHESIS BI: CPT | Performed by: FAMILY MEDICINE

## 2021-04-27 PROCEDURE — 77067 SCR MAMMO BI INCL CAD: CPT | Performed by: FAMILY MEDICINE

## 2021-05-06 NOTE — TELEPHONE ENCOUNTER
Attempted to call pt but her voicemail was full. Pt to schedule an appt for fututre refills.  Ask if she has enough  (message sent through My Chart)

## 2021-05-08 ENCOUNTER — TELEPHONE (OUTPATIENT)
Dept: FAMILY MEDICINE CLINIC | Facility: CLINIC | Age: 66
End: 2021-05-08

## 2021-05-08 ENCOUNTER — HOSPITAL ENCOUNTER (OUTPATIENT)
Age: 66
Discharge: HOME | End: 2021-05-08
Payer: MEDICARE

## 2021-05-08 VITALS
DIASTOLIC BLOOD PRESSURE: 98 MMHG | HEART RATE: 85 BPM | OXYGEN SATURATION: 100 % | RESPIRATION RATE: 16 BRPM | SYSTOLIC BLOOD PRESSURE: 145 MMHG | TEMPERATURE: 98 F

## 2021-05-08 DIAGNOSIS — L02.415 ABSCESS OF RIGHT THIGH: Primary | ICD-10-CM

## 2021-05-08 PROCEDURE — 99213 OFFICE O/P EST LOW 20 MIN: CPT | Performed by: PHYSICIAN ASSISTANT

## 2021-05-08 RX ORDER — CEPHALEXIN 500 MG/1
500 CAPSULE ORAL 4 TIMES DAILY
Qty: 40 CAPSULE | Refills: 0 | Status: SHIPPED | OUTPATIENT
Start: 2021-05-08 | End: 2021-05-18

## 2021-05-08 NOTE — TELEPHONE ENCOUNTER
NEAR CROTCH AREA LUMP  THAT IS LIKE THE SIZE OF A GOLF BALL PATIENT CALLED VERY CONCERNED.   SHE WOKE UP WITH THIS

## 2021-05-08 NOTE — TELEPHONE ENCOUNTER
Woke up with swelling to left upper thigh inside painful sent patient to urgent care in [place] to be seen for this

## 2021-05-08 NOTE — ED PROVIDER NOTES
Patient Seen in: [facility]mediate [address] [place] [address]      History   Patient presents with:  Rash Skin Problem    Stated Complaint: abscess    HPI/Subjective:   HPI    CHIEF COMPLAINT: Possible abscess left upper thigh    HISTORY OF PRESENT ILLNESS: Patient is a pleasa left   • OTHER  7/11/2016    LUMBAR LAMINECTOMY 1 LEVEL   • SPINAL FUSION  2017    L4-L5 posterolateral spinal fusion with L4-L5 decompressive lumbar laminectomy with nerve root decompression                Social History    Tobacco Use      Smoking status visit.   I determined, within reasonable clinical confidence and prior to discharge, that an emergency medical condition was not or was no longer present. There was no indication for further evaluation, treatment or admission on an emergency basis.   Compr

## 2021-05-10 NOTE — TELEPHONE ENCOUNTER
Third attempt called unable to [facility] mailbox is full. 3 tries letter sent.

## 2021-05-11 ENCOUNTER — TELEPHONE (OUTPATIENT)
Dept: FAMILY MEDICINE CLINIC | Facility: CLINIC | Age: 66
End: 2021-05-11

## 2021-05-11 NOTE — TELEPHONE ENCOUNTER
Unable to [facility] for patient to schedule her MA Supervisit with Dr. Solis Guzman or Dr. Sharlotte Boxer.

## 2021-05-12 ENCOUNTER — TELEPHONE (OUTPATIENT)
Dept: FAMILY MEDICINE CLINIC | Facility: CLINIC | Age: 66
End: 2021-05-12

## 2021-05-12 NOTE — TELEPHONE ENCOUNTER
Called and talked to patient she does not feel it is getting any better. Made an appointment to be seen by Dr Ivone Rodriguez tomorrow.

## 2021-05-12 NOTE — TELEPHONE ENCOUNTER
Patient was sent to [facility] 05/08/21 for a cyst on the inside of her thigh and was put on Cephalexin, states still very painful, wondering if there should be any improvement by now

## 2021-05-13 ENCOUNTER — OFFICE VISIT (OUTPATIENT)
Dept: FAMILY MEDICINE CLINIC | Facility: CLINIC | Age: 66
End: 2021-05-13
Payer: MEDICARE

## 2021-05-13 VITALS
HEART RATE: 78 BPM | OXYGEN SATURATION: 98 % | HEIGHT: 66 IN | RESPIRATION RATE: 14 BRPM | TEMPERATURE: 98 F | DIASTOLIC BLOOD PRESSURE: 74 MMHG | BODY MASS INDEX: 39.7 KG/M2 | WEIGHT: 247 LBS | SYSTOLIC BLOOD PRESSURE: 120 MMHG

## 2021-05-13 DIAGNOSIS — F32.1 CURRENT MODERATE EPISODE OF MAJOR DEPRESSIVE DISORDER WITHOUT PRIOR EPISODE (HCC): ICD-10-CM

## 2021-05-13 DIAGNOSIS — L03.314 CELLULITIS OF GROIN: Primary | ICD-10-CM

## 2021-05-13 DIAGNOSIS — L02.214 ABSCESS OF GROIN, LEFT: ICD-10-CM

## 2021-05-13 PROCEDURE — 87070 CULTURE OTHR SPECIMN AEROBIC: CPT | Performed by: FAMILY MEDICINE

## 2021-05-13 PROCEDURE — 87186 SC STD MICRODIL/AGAR DIL: CPT | Performed by: FAMILY MEDICINE

## 2021-05-13 PROCEDURE — 87205 SMEAR GRAM STAIN: CPT | Performed by: FAMILY MEDICINE

## 2021-05-13 PROCEDURE — 99213 OFFICE O/P EST LOW 20 MIN: CPT | Performed by: FAMILY MEDICINE

## 2021-05-13 PROCEDURE — 87077 CULTURE AEROBIC IDENTIFY: CPT | Performed by: FAMILY MEDICINE

## 2021-05-13 PROCEDURE — 3074F SYST BP LT 130 MM HG: CPT | Performed by: FAMILY MEDICINE

## 2021-05-13 PROCEDURE — 3078F DIAST BP <80 MM HG: CPT | Performed by: FAMILY MEDICINE

## 2021-05-13 PROCEDURE — 3008F BODY MASS INDEX DOCD: CPT | Performed by: FAMILY MEDICINE

## 2021-05-13 RX ORDER — CLINDAMYCIN HYDROCHLORIDE 300 MG/1
300 CAPSULE ORAL 3 TIMES DAILY
Qty: 30 CAPSULE | Refills: 0 | Status: SHIPPED | OUTPATIENT
Start: 2021-05-13 | End: 2021-05-17

## 2021-05-13 NOTE — PROGRESS NOTES
Chief Complaint:  Patient presents with:  Urgent Care F/u: On 05- for Cyst on Left Groin Area, painful, was prescribe Cephlalexin with no relief-tried to apply heat to the area with no relief     HPI:  This is a 72year old female patient presenting EXCIS LUMBAR DISK,ONE LEVEL     • KNEE REPLACEMENT SURGERY  8/2007    right   • KNEE REPLACEMENT SURGERY  8/2013    left   • OTHER  7/11/2016    LUMBAR LAMINECTOMY 1 LEVEL   • SPINAL FUSION  2017    L4-L5 posterolateral spinal fusion with L4-L5 decompressi sprays by Nasal route daily as needed. 1 Bottle 5   • Magnesium-Calcium-Folic Acid 879-815-6 MG Oral Tab Take 400 mg by mouth daily.      • clotrimazole-betamethasone 1-0.05 % External Cream Apply as needed 45 g 1   • Fructooligosaccharides (FOS) Oral Powde along L groin. Culture of discharge sent today. Will step up to clindamycin. To cont warm compresses.   -     AEROBIC BACTERIAL CULTURE; Future    Current moderate episode of major depressive disorder without prior episode ([facility] [address]  Cont current meds     RTC i

## 2021-05-17 ENCOUNTER — OFFICE VISIT (OUTPATIENT)
Dept: FAMILY MEDICINE CLINIC | Facility: CLINIC | Age: 66
End: 2021-05-17
Payer: MEDICARE

## 2021-05-17 ENCOUNTER — HOSPITAL ENCOUNTER (EMERGENCY)
Facility: HOSPITAL | Age: 66
Discharge: HOME | End: 2021-05-17
Attending: EMERGENCY MEDICINE
Payer: MEDICARE

## 2021-05-17 VITALS
HEIGHT: 66 IN | OXYGEN SATURATION: 97 % | TEMPERATURE: 99 F | HEART RATE: 81 BPM | SYSTOLIC BLOOD PRESSURE: 144 MMHG | DIASTOLIC BLOOD PRESSURE: 78 MMHG | RESPIRATION RATE: 20 BRPM | WEIGHT: 240 LBS | BODY MASS INDEX: 38.57 KG/M2

## 2021-05-17 VITALS
HEIGHT: 66 IN | BODY MASS INDEX: 39.7 KG/M2 | RESPIRATION RATE: 14 BRPM | WEIGHT: 247 LBS | TEMPERATURE: 98 F | SYSTOLIC BLOOD PRESSURE: 130 MMHG | DIASTOLIC BLOOD PRESSURE: 76 MMHG | OXYGEN SATURATION: 98 % | HEART RATE: 86 BPM

## 2021-05-17 DIAGNOSIS — L02.214 ABSCESS OF GROIN, LEFT: ICD-10-CM

## 2021-05-17 DIAGNOSIS — L03.818 CELLULITIS OF OTHER SPECIFIED SITE: ICD-10-CM

## 2021-05-17 DIAGNOSIS — L02.91 ABSCESS: Primary | ICD-10-CM

## 2021-05-17 DIAGNOSIS — L03.314 CELLULITIS OF GROIN: Primary | ICD-10-CM

## 2021-05-17 PROCEDURE — 3075F SYST BP GE 130 - 139MM HG: CPT | Performed by: FAMILY MEDICINE

## 2021-05-17 PROCEDURE — 87070 CULTURE OTHR SPECIMN AEROBIC: CPT | Performed by: EMERGENCY MEDICINE

## 2021-05-17 PROCEDURE — 10061 I&D ABSCESS COMP/MULTIPLE: CPT

## 2021-05-17 PROCEDURE — 3008F BODY MASS INDEX DOCD: CPT | Performed by: FAMILY MEDICINE

## 2021-05-17 PROCEDURE — 3078F DIAST BP <80 MM HG: CPT | Performed by: FAMILY MEDICINE

## 2021-05-17 PROCEDURE — 99213 OFFICE O/P EST LOW 20 MIN: CPT | Performed by: FAMILY MEDICINE

## 2021-05-17 PROCEDURE — 99283 EMERGENCY DEPT VISIT LOW MDM: CPT

## 2021-05-17 PROCEDURE — 87077 CULTURE AEROBIC IDENTIFY: CPT | Performed by: EMERGENCY MEDICINE

## 2021-05-17 PROCEDURE — 87205 SMEAR GRAM STAIN: CPT | Performed by: EMERGENCY MEDICINE

## 2021-05-17 PROCEDURE — 82962 GLUCOSE BLOOD TEST: CPT

## 2021-05-17 NOTE — PROCEDURES
Left proximal medial thigh abscess; Topical let allowed to sit for 25 minutes. Using 1% lidocaine with epinephrine, 3 cc were locally injected. Sterile drape initiated. Using 11 blade on handle a 1.5 cm Jason incision was performed.  Good release of pu

## 2021-05-17 NOTE — ED PROVIDER NOTES
Patient Seen in: [facility] Emergency Department      History   Patient presents with:  Cellulitis    Stated Complaint: cellulitis to groin, not getting better with oral abx    HPI/Subjective:   HPI    51-year-old female presents to the emergency de Social History    Tobacco Use      Smoking status: Never Smoker      Smokeless tobacco: Never Used    Vaping Use      Vaping Use: Never used    Alcohol use: No      Alcohol/week: 0.0 standard drinks      Comment: 4 drinks per year/rare     proceed with doing incision and drainage. He prepped and draped her and did a cruciate incision and did obtain quite a bit of purulent material.  Cultures were obtained. The wound was packed. We reviewed wound care.   Currently her blood sugar is within

## 2021-05-17 NOTE — ED INITIAL ASSESSMENT (HPI)
Patient here with cellulitis to the left groin over the last week. Has been on antibiotics for it and PMD believes she needs IV abx. No fevers.

## 2021-05-17 NOTE — PROGRESS NOTES
Chief Complaint:  Patient presents with:  Rash Skin Problem:  Follow Up on Cellulitis on Groin     HPI:  This is a 72year old female patient presenting for Rash Skin Problem (Follow Up on Cellulitis on Groin )    Initially dx with cellulitis and abscess on LUMBAR LAMINECTOMY 1 LEVEL   • SPINAL FUSION  2017    L4-L5 posterolateral spinal fusion with L4-L5 decompressive lumbar laminectomy with nerve root decompression      Family History   Problem Relation Age of Onset   • Cancer Sister         Breast Cancer needed. 1 Bottle 5   • Magnesium-Calcium-Folic Acid 832-644-0 MG Oral Tab Take 400 mg by mouth daily.      • clotrimazole-betamethasone 1-0.05 % External Cream Apply as needed 45 g 1   • Fructooligosaccharides (FOS) Oral Powder Take 1 Package by mouth daily detail. Concerning signs and symptoms that warrant returning to the clinic reviewed and patient demonstrated understanding.   Flavia Cartwright DO  5/17/2021 11:14 AM  Family Medicine

## 2021-05-18 ENCOUNTER — TELEPHONE (OUTPATIENT)
Dept: FAMILY MEDICINE CLINIC | Facility: CLINIC | Age: 66
End: 2021-05-18

## 2021-05-18 NOTE — TELEPHONE ENCOUNTER
Serena Garcia is calling wants to know how to  treat   Her drain abscess. She was applying hot packs would like to know what she should be doing now after having her incision and drain.

## 2021-05-18 NOTE — TELEPHONE ENCOUNTER
Went over ED notes and then discussed with patient that she should apply heat to the area using a heating pad and leave the dressing in place. In 2 days she is to go back to the ED to have the packing removed.  She will follow up with ED

## 2021-05-20 ENCOUNTER — OFFICE VISIT (OUTPATIENT)
Dept: FAMILY MEDICINE CLINIC | Facility: CLINIC | Age: 66
End: 2021-05-20
Payer: MEDICARE

## 2021-05-20 VITALS
TEMPERATURE: 98 F | SYSTOLIC BLOOD PRESSURE: 126 MMHG | HEIGHT: 66 IN | DIASTOLIC BLOOD PRESSURE: 70 MMHG | BODY MASS INDEX: 38.57 KG/M2 | OXYGEN SATURATION: 98 % | WEIGHT: 240 LBS | RESPIRATION RATE: 14 BRPM | HEART RATE: 78 BPM

## 2021-05-20 DIAGNOSIS — L03.314 CELLULITIS OF GROIN: ICD-10-CM

## 2021-05-20 DIAGNOSIS — L02.214 ABSCESS OF GROIN, LEFT: Primary | ICD-10-CM

## 2021-05-20 PROCEDURE — 3008F BODY MASS INDEX DOCD: CPT | Performed by: FAMILY MEDICINE

## 2021-05-20 PROCEDURE — 3074F SYST BP LT 130 MM HG: CPT | Performed by: FAMILY MEDICINE

## 2021-05-20 PROCEDURE — 3078F DIAST BP <80 MM HG: CPT | Performed by: FAMILY MEDICINE

## 2021-05-20 PROCEDURE — 99213 OFFICE O/P EST LOW 20 MIN: CPT | Performed by: FAMILY MEDICINE

## 2021-05-20 RX ORDER — CLINDAMYCIN HYDROCHLORIDE 300 MG/1
300 CAPSULE ORAL 3 TIMES DAILY
Qty: 12 CAPSULE | Refills: 0 | Status: SHIPPED | OUTPATIENT
Start: 2021-05-20 | End: 2021-06-22 | Stop reason: ALTCHOICE

## 2021-05-20 NOTE — PROGRESS NOTES
Chief Complaint:  Patient presents with:  Groin Pain: Removal of packing     HPI:  This is a 72year old female patient presenting for Groin Pain (Removal of packing )    Patient seen 4 days ago and noted to have minimal improvement in cellulitis on clinda SPINAL FUSION  2017    L4-L5 posterolateral spinal fusion with L4-L5 decompressive lumbar laminectomy with nerve root decompression      Family History   Problem Relation Age of Onset   • Cancer Sister         Breast Cancer   • Breast Cancer Sister 48 Cream Apply as needed 45 g 1   • Fructooligosaccharides (FOS) Oral Powder Take 1 Package by mouth daily. • Multiple Vitamins-Minerals (MULTIPLE VITAMINS/WOMENS OR) Take 1 tablet by mouth daily.        • Red Yeast Rice 600 MG Oral Cap Take 1 capsule by total) by mouth 3 (three) times daily. Concerning signs and symptoms that warrant returning to the clinic reviewed and patient demonstrated understanding.   Shravan Wray DO  5/20/2021 11:09 AM  Family Medicine

## 2021-05-24 ENCOUNTER — TELEPHONE (OUTPATIENT)
Dept: FAMILY MEDICINE CLINIC | Facility: CLINIC | Age: 66
End: 2021-05-24

## 2021-05-24 DIAGNOSIS — I10 ESSENTIAL HYPERTENSION, BENIGN: ICD-10-CM

## 2021-05-24 RX ORDER — LISINOPRIL AND HYDROCHLOROTHIAZIDE 12.5; 1 MG/1; MG/1
1 TABLET ORAL
Qty: 90 TABLET | Refills: 1 | Status: SHIPPED | OUTPATIENT
Start: 2021-05-24 | End: 2021-11-22

## 2021-05-24 RX ORDER — LISINOPRIL AND HYDROCHLOROTHIAZIDE 12.5; 1 MG/1; MG/1
1 TABLET ORAL
Qty: 30 TABLET | Refills: 0 | Status: SHIPPED | OUTPATIENT
Start: 2021-05-24 | End: 2021-05-24

## 2021-05-24 NOTE — TELEPHONE ENCOUNTER
Medication refilled per protocol. Requested Prescriptions     Signed Prescriptions Disp Refills   • Lisinopril-hydroCHLOROthiazide 10-12.5 MG Oral Tab 30 tablet 0     Sig: Take 1 tablet by mouth once daily.      Authorizing Provider: Layton Schulz

## 2021-05-24 NOTE — TELEPHONE ENCOUNTER
Pt needs refill on lisinopril. Please send temp refill to [facility] and another refill to [facility] please.

## 2021-06-21 ENCOUNTER — TELEPHONE (OUTPATIENT)
Dept: FAMILY MEDICINE CLINIC | Facility: CLINIC | Age: 66
End: 2021-06-21

## 2021-06-21 DIAGNOSIS — F32.1 CURRENT MODERATE EPISODE OF MAJOR DEPRESSIVE DISORDER WITHOUT PRIOR EPISODE (HCC): ICD-10-CM

## 2021-06-21 DIAGNOSIS — E66.01 OBESITY, CLASS III, BMI 40-49.9 (MORBID OBESITY) (HCC): ICD-10-CM

## 2021-06-21 RX ORDER — BUPROPION HYDROCHLORIDE 150 MG/1
150 TABLET ORAL DAILY
Qty: 90 TABLET | Refills: 3 | Status: CANCELLED | OUTPATIENT
Start: 2021-06-21

## 2021-06-21 NOTE — TELEPHONE ENCOUNTER
Pt states she is unable to request refill from [facility] for Bupropion. Pt is completely out of meds and needs refill asap. I advised that she may need to schedule a px exam. Will schedule if she needs to.

## 2021-06-21 NOTE — TELEPHONE ENCOUNTER
Refill request for:    Requested Prescriptions     Pending Prescriptions Disp Refills   • buPROPion HCl ER, XL, 150 MG Oral Tablet 24 Hr 90 tablet 3     Sig: Take 1 tablet (150 mg total) by mouth daily.         Last Prescribed Quantity Refills   6/10/2019 9

## 2021-06-21 NOTE — TELEPHONE ENCOUNTER
Future Appointments     Date Time Provider Denisa Guerrero   6/22/2021 12:00 PM Uriel Villalpando, DO [facility] [place]   7/8/2021 11:00 AM Kev Almazan MD [id number]  [facility]      Pt states that she did not take Bupropion consistently.  She would skip do

## 2021-06-22 ENCOUNTER — OFFICE VISIT (OUTPATIENT)
Dept: FAMILY MEDICINE CLINIC | Facility: CLINIC | Age: 66
End: 2021-06-22
Payer: MEDICARE

## 2021-06-22 VITALS
DIASTOLIC BLOOD PRESSURE: 80 MMHG | RESPIRATION RATE: 16 BRPM | HEART RATE: 83 BPM | SYSTOLIC BLOOD PRESSURE: 118 MMHG | WEIGHT: 247 LBS | TEMPERATURE: 98 F | HEIGHT: 66.25 IN | BODY MASS INDEX: 39.7 KG/M2

## 2021-06-22 DIAGNOSIS — E66.01 OBESITY, CLASS III, BMI 40-49.9 (MORBID OBESITY) (HCC): ICD-10-CM

## 2021-06-22 DIAGNOSIS — F32.1 CURRENT MODERATE EPISODE OF MAJOR DEPRESSIVE DISORDER WITHOUT PRIOR EPISODE (HCC): ICD-10-CM

## 2021-06-22 PROCEDURE — 99213 OFFICE O/P EST LOW 20 MIN: CPT | Performed by: FAMILY MEDICINE

## 2021-06-22 PROCEDURE — 3008F BODY MASS INDEX DOCD: CPT | Performed by: FAMILY MEDICINE

## 2021-06-22 PROCEDURE — 3079F DIAST BP 80-89 MM HG: CPT | Performed by: FAMILY MEDICINE

## 2021-06-22 PROCEDURE — 3074F SYST BP LT 130 MM HG: CPT | Performed by: FAMILY MEDICINE

## 2021-06-22 RX ORDER — BUPROPION HYDROCHLORIDE 150 MG/1
150 TABLET ORAL DAILY
Qty: 90 TABLET | Refills: 3 | Status: SHIPPED | OUTPATIENT
Start: 2021-06-22

## 2021-06-22 NOTE — PROGRESS NOTES
Chief Complaint:  Patient presents with:  Medication Request: bupropion    HPI:  This is a 72year old female patient presenting for Medication Request (bupropion)    MDD, MARIA LUISA:   On wellbutrin in addition to cymbalta. Feel symptoms are stable.  Denies SI/HI Twin sister.    • Diabetes Father    • Heart Disease Father    • Other (Other) Father    • Heart Disease Mother    • Other (NAYLA) Brother       Social History    Tobacco Use      Smoking status: Never Smoker      Smokeless tobacco: Never Used    Vaping U Allergies:    Adhesive Tape           OTHER (SEE COMMENTS)    Comment:Ok with paper tape  Menthol                 RASH  Seasonal                Runny nose    EXAM:   06/22/21  1155   BP: 118/80   Pulse: 83   Resp: 16   Temp: 98.2 °F (36.8 °C)   TempSrc

## 2021-07-02 ENCOUNTER — TELEPHONE (OUTPATIENT)
Dept: FAMILY MEDICINE CLINIC | Facility: CLINIC | Age: 66
End: 2021-07-02

## 2021-07-02 DIAGNOSIS — R73.03 BORDERLINE DIABETES: ICD-10-CM

## 2021-07-02 DIAGNOSIS — H57.10 PAIN AROUND EYE, UNSPECIFIED LATERALITY: Primary | ICD-10-CM

## 2021-07-02 NOTE — TELEPHONE ENCOUNTER
Referral request Dr. Reji Osborne M.D., Ophthalmology    Six visits    Diagnosis: H57.10 and R73.03    Auth per [facility] [id number] valid 7/2/21 to 12/29/2021

## 2021-07-02 NOTE — TELEPHONE ENCOUNTER
Message left for patient on mobile phone that referral to Dr. Cynthia Chandler M.D. is approved and faxed to their office.

## 2021-07-27 DIAGNOSIS — M54.16 BILATERAL LUMBAR RADICULOPATHY: ICD-10-CM

## 2021-07-27 NOTE — TELEPHONE ENCOUNTER
Patient is requesting her cyclobenzaprine 10 MG Oral Tab refilled ASAP she going on a trip and wants to be prepared . Please instruct patient on how to proceed.

## 2021-07-27 NOTE — TELEPHONE ENCOUNTER
LOV 6/22/21 with Dr Antonietta Sandoval pended order if Dr Samanta Maza wants to reorder this leaving tomorrow at 4 am

## 2021-07-28 RX ORDER — CYCLOBENZAPRINE HCL 10 MG
TABLET ORAL
Qty: 45 TABLET | Refills: 0 | Status: SHIPPED | OUTPATIENT
Start: 2021-07-28 | End: 2021-11-04

## 2021-07-29 RX ORDER — CYCLOBENZAPRINE HCL 10 MG
TABLET ORAL
Qty: 45 TABLET | Refills: 0 | OUTPATIENT
Start: 2021-07-29

## 2021-08-26 ENCOUNTER — TELEPHONE (OUTPATIENT)
Dept: FAMILY MEDICINE CLINIC | Facility: CLINIC | Age: 66
End: 2021-08-26

## 2021-08-26 NOTE — TELEPHONE ENCOUNTER
Vitor Wang is calling from [facility] and is calling to recommend a Statin for her.  Please call

## 2021-10-18 ENCOUNTER — TELEPHONE (OUTPATIENT)
Dept: FAMILY MEDICINE CLINIC | Facility: CLINIC | Age: 66
End: 2021-10-18

## 2021-10-21 ENCOUNTER — OFFICE VISIT (OUTPATIENT)
Dept: FAMILY MEDICINE CLINIC | Facility: CLINIC | Age: 66
End: 2021-10-21
Payer: MEDICARE

## 2021-10-21 ENCOUNTER — LAB ENCOUNTER (OUTPATIENT)
Dept: LAB | Age: 66
End: 2021-10-21
Attending: FAMILY MEDICINE
Payer: MEDICARE

## 2021-10-21 VITALS
RESPIRATION RATE: 16 BRPM | HEIGHT: 66 IN | HEART RATE: 86 BPM | SYSTOLIC BLOOD PRESSURE: 150 MMHG | WEIGHT: 251 LBS | TEMPERATURE: 98 F | BODY MASS INDEX: 40.34 KG/M2 | DIASTOLIC BLOOD PRESSURE: 100 MMHG

## 2021-10-21 DIAGNOSIS — I10 ESSENTIAL HYPERTENSION: ICD-10-CM

## 2021-10-21 DIAGNOSIS — E78.5 HYPERLIPIDEMIA, UNSPECIFIED HYPERLIPIDEMIA TYPE: ICD-10-CM

## 2021-10-21 DIAGNOSIS — Z13.6 SCREENING FOR CARDIOVASCULAR CONDITION: ICD-10-CM

## 2021-10-21 DIAGNOSIS — G47.33 OSA (OBSTRUCTIVE SLEEP APNEA): ICD-10-CM

## 2021-10-21 DIAGNOSIS — Z86.39 HISTORY OF HYPOTHYROIDISM: ICD-10-CM

## 2021-10-21 DIAGNOSIS — Z00.00 ENCOUNTER FOR ANNUAL HEALTH EXAMINATION: ICD-10-CM

## 2021-10-21 DIAGNOSIS — Z00.00 ENCOUNTER FOR ANNUAL HEALTH EXAMINATION: Primary | ICD-10-CM

## 2021-10-21 DIAGNOSIS — R73.03 PREDIABETES: ICD-10-CM

## 2021-10-21 DIAGNOSIS — F32.1 CURRENT MODERATE EPISODE OF MAJOR DEPRESSIVE DISORDER WITHOUT PRIOR EPISODE (HCC): ICD-10-CM

## 2021-10-21 DIAGNOSIS — Z11.59 NEED FOR HEPATITIS C SCREENING TEST: ICD-10-CM

## 2021-10-21 DIAGNOSIS — N18.31 STAGE 3A CHRONIC KIDNEY DISEASE (HCC): ICD-10-CM

## 2021-10-21 DIAGNOSIS — Z23 NEED FOR VIRAL IMMUNIZATION: ICD-10-CM

## 2021-10-21 DIAGNOSIS — K21.9 GASTROESOPHAGEAL REFLUX DISEASE, UNSPECIFIED WHETHER ESOPHAGITIS PRESENT: ICD-10-CM

## 2021-10-21 DIAGNOSIS — Z12.11 SCREEN FOR COLON CANCER: ICD-10-CM

## 2021-10-21 DIAGNOSIS — M54.16 BILATERAL LUMBAR RADICULOPATHY: ICD-10-CM

## 2021-10-21 DIAGNOSIS — M48.062 SPINAL STENOSIS OF LUMBAR REGION WITH NEUROGENIC CLAUDICATION: ICD-10-CM

## 2021-10-21 DIAGNOSIS — E66.01 CLASS 3 SEVERE OBESITY WITHOUT SERIOUS COMORBIDITY WITH BODY MASS INDEX (BMI) OF 40.0 TO 44.9 IN ADULT, UNSPECIFIED OBESITY TYPE (HCC): ICD-10-CM

## 2021-10-21 DIAGNOSIS — G91.9 HYDROCEPHALUS, UNSPECIFIED TYPE (HCC): ICD-10-CM

## 2021-10-21 DIAGNOSIS — D12.6 ADENOMATOUS POLYP OF COLON, UNSPECIFIED PART OF COLON: ICD-10-CM

## 2021-10-21 DIAGNOSIS — Z78.0 POSTMENOPAUSAL: ICD-10-CM

## 2021-10-21 PROBLEM — R51.9 NONINTRACTABLE HEADACHE, UNSPECIFIED CHRONICITY PATTERN, UNSPECIFIED HEADACHE TYPE: Status: RESOLVED | Noted: 2017-11-02 | Resolved: 2021-10-21

## 2021-10-21 PROCEDURE — 90732 PPSV23 VACC 2 YRS+ SUBQ/IM: CPT | Performed by: FAMILY MEDICINE

## 2021-10-21 PROCEDURE — G0438 PPPS, INITIAL VISIT: HCPCS | Performed by: FAMILY MEDICINE

## 2021-10-21 PROCEDURE — 99397 PER PM REEVAL EST PAT 65+ YR: CPT | Performed by: FAMILY MEDICINE

## 2021-10-21 PROCEDURE — 84443 ASSAY THYROID STIM HORMONE: CPT

## 2021-10-21 PROCEDURE — 86803 HEPATITIS C AB TEST: CPT

## 2021-10-21 PROCEDURE — 80061 LIPID PANEL: CPT

## 2021-10-21 PROCEDURE — 3080F DIAST BP >= 90 MM HG: CPT | Performed by: FAMILY MEDICINE

## 2021-10-21 PROCEDURE — 36415 COLL VENOUS BLD VENIPUNCTURE: CPT

## 2021-10-21 PROCEDURE — 3077F SYST BP >= 140 MM HG: CPT | Performed by: FAMILY MEDICINE

## 2021-10-21 PROCEDURE — 80053 COMPREHEN METABOLIC PANEL: CPT

## 2021-10-21 PROCEDURE — G0009 ADMIN PNEUMOCOCCAL VACCINE: HCPCS | Performed by: FAMILY MEDICINE

## 2021-10-21 PROCEDURE — 83036 HEMOGLOBIN GLYCOSYLATED A1C: CPT

## 2021-10-21 PROCEDURE — 96160 PT-FOCUSED HLTH RISK ASSMT: CPT | Performed by: FAMILY MEDICINE

## 2021-10-21 PROCEDURE — 3008F BODY MASS INDEX DOCD: CPT | Performed by: FAMILY MEDICINE

## 2021-10-21 NOTE — PROGRESS NOTES
HPI:   Luis Kingston is a 77year old female who presents for a MA (Medicare Advantage) [facility] (Once per calendar year).     Current medical conditions:     Acquired spondylolisthesis     Degeneration of lumbar or lumbosacral intervertebral disc Obesity screening: Body mass index is 40.51 kg/m².   Diabetes screening:  due  Hypercholesterolemia screening:  Lab Results   Component Value Date    HDL 62 (H) 09/08/2020    HDL 78 (H) 06/19/2019    HDL 74 03/14/2018     Lab Results   Component Value Ambrosio been moving around a lot more than usual: Not at all  9. Thoughts that you would be better off dead, or of hurting yourself in some way: Not at all  PHQ-9 TOTAL SCORE: 5           Advanced Directive:  She does NOT have a Living Will on file in [place].    Indra Bhardwaj (obstructive sleep apnea)     Gastroesophageal reflux disease     Bilateral lumbar radiculopathy     Stress     Hydrocephalus (HCC)     Acute nonintractable headache     Nonintractable headache, unspecified chronicity pattern, unspecified headache type needed for Migraine. DULoxetine HCl 60 MG Oral Cap DR Particles, Take 1 capsule (60 mg total) by mouth once daily. Fluticasone Propionate 50 MCG/ACT Nasal Suspension, 2 sprays by Nasal route daily as needed.   Magnesium-Calcium-Folic Acid 479-367-8 MG Ora with exertion  CARDIOVASCULAR: denies chest pain on exertion  GI: denies abdominal pain, denies heartburn  : denies dysuria, vaginal discharge or itching, no complaint of urinary incontinence   MUSCULOSKELETAL: denies back pain  NEURO: denies headaches quadrants,  no masses, no organomegaly   Pelvic: Deferred   Extremities: Extremities normal, atraumatic, no cyanosis or edema   Pulses: 2+ and symmetric   Skin: Skin color, texture, turgor normal, no rashes or lesions   Lymph nodes: Cervical, supraclavicul wellbutrin and duloxetine. Class 3 severe obesity without serious comorbidity with body mass index (BMI) of 40.0 to 44.9 in adult, unspecified obesity type ([facility] [address]  Discussed diet strategies.      Adenomatous polyp of colon, unspecified part of colon  Due tested but not diagnosed with pre-diabetes   One screening every 6 months if diagnosed with pre-diabetes Lab Results   Component Value Date    GLU 99 09/08/2020        Cardiovascular Disease Screening    Lipid Panel  Cholesterol  Lipoprotein (HDL)  Triglyc Influenza Covered once per flu season  Please get every year -  Influenza Vaccine(1) due on 10/01/2021    Pneumococcal Each vaccine ([id number] & Arrrmcnvy88) covered once after 65 Prevnar 13: -    [id number]: -     No recommendations at this time    He

## 2021-10-22 ENCOUNTER — TELEPHONE (OUTPATIENT)
Dept: FAMILY MEDICINE CLINIC | Facility: CLINIC | Age: 66
End: 2021-10-22

## 2021-10-22 NOTE — TELEPHONE ENCOUNTER
Reached patient for medication adherence consult. Patient is past due for refill on metformin; last filled 6/9/21 for 90 day supply. Patient is still taking this medication and has almost a full bottle left still.  She denies forgetting or missing medic

## 2021-10-25 ENCOUNTER — TELEPHONE (OUTPATIENT)
Dept: FAMILY MEDICINE CLINIC | Facility: CLINIC | Age: 66
End: 2021-10-25

## 2021-10-25 NOTE — TELEPHONE ENCOUNTER
Good BP, continue current meds and follow-up as planned. Reviewed patient's concerns. Patient has no further questions or concerns at this time.

## 2021-10-25 NOTE — TELEPHONE ENCOUNTER
Pt has a question about what she read on her AVS, 1. Stage 3a chronic kidney disease and  2. Hep C screening test.     She does not recall speaking to provider about any of these issues. Also needs a COVID test for an upcoming colonoscopy.

## 2021-11-11 ENCOUNTER — HOSPITAL ENCOUNTER (OUTPATIENT)
Dept: BONE DENSITY | Age: 66
Discharge: HOME | End: 2021-11-11
Attending: FAMILY MEDICINE
Payer: MEDICARE

## 2021-11-11 DIAGNOSIS — Z78.0 POSTMENOPAUSAL: ICD-10-CM

## 2021-11-11 PROCEDURE — 77080 DXA BONE DENSITY AXIAL: CPT | Performed by: FAMILY MEDICINE

## 2021-11-22 ENCOUNTER — OFFICE VISIT (OUTPATIENT)
Dept: FAMILY MEDICINE CLINIC | Facility: CLINIC | Age: 66
End: 2021-11-22
Payer: MEDICARE

## 2021-11-22 VITALS
BODY MASS INDEX: 40.28 KG/M2 | WEIGHT: 250.63 LBS | SYSTOLIC BLOOD PRESSURE: 138 MMHG | DIASTOLIC BLOOD PRESSURE: 100 MMHG | RESPIRATION RATE: 20 BRPM | TEMPERATURE: 99 F | HEART RATE: 92 BPM | HEIGHT: 66 IN

## 2021-11-22 DIAGNOSIS — I10 ESSENTIAL HYPERTENSION, BENIGN: ICD-10-CM

## 2021-11-22 DIAGNOSIS — R41.3 MEMORY DIFFICULTIES: Primary | ICD-10-CM

## 2021-11-22 PROCEDURE — 3075F SYST BP GE 130 - 139MM HG: CPT | Performed by: FAMILY MEDICINE

## 2021-11-22 PROCEDURE — 99214 OFFICE O/P EST MOD 30 MIN: CPT | Performed by: FAMILY MEDICINE

## 2021-11-22 PROCEDURE — 3008F BODY MASS INDEX DOCD: CPT | Performed by: FAMILY MEDICINE

## 2021-11-22 PROCEDURE — 3080F DIAST BP >= 90 MM HG: CPT | Performed by: FAMILY MEDICINE

## 2021-11-22 RX ORDER — LISINOPRIL AND HYDROCHLOROTHIAZIDE 25; 20 MG/1; MG/1
1 TABLET ORAL DAILY
Qty: 90 TABLET | Refills: 0 | Status: SHIPPED | OUTPATIENT
Start: 2021-11-22 | End: 2022-01-03

## 2021-11-22 NOTE — PROGRESS NOTES
Chief Complaint:  Patient presents with:  Blood Pressure: 1 month medication and BP check    HPI:  This is a 77year old female patient presenting for Blood Pressure (1 month medication and BP check)    Last visit noted to have elevated BP.  Increase lisino History:   Procedure Laterality Date   • BACK SURGERY     • EXCIS LUMBAR DISK,ONE LEVEL     • KNEE REPLACEMENT SURGERY  8/2007    right   • KNEE REPLACEMENT SURGERY  8/2013    left   • OTHER  7/11/2016    LUMBAR LAMINECTOMY 1 LEVEL   • SPINAL FUSION  2017 Weight: 250 lb 9.6 oz (113.7 kg)     Height: 5' 6\" (1.676 m)       GENERAL: vitals reviewed and listed above, alert, oriented, appears well hydrated and in no acute distress  HEENT: atraumatic, conjunctiva clear, no obvious abnormalities on inspection

## 2021-12-03 ENCOUNTER — LAB ENCOUNTER (OUTPATIENT)
Dept: LAB | Age: 66
End: 2021-12-03
Attending: INTERNAL MEDICINE
Payer: MEDICARE

## 2021-12-03 DIAGNOSIS — Z01.818 PRE-OP TESTING: ICD-10-CM

## 2021-12-06 PROBLEM — Z86.010 HISTORY OF ADENOMATOUS POLYP OF COLON: Status: ACTIVE | Noted: 2021-12-06

## 2021-12-06 PROBLEM — D12.2 BENIGN NEOPLASM OF ASCENDING COLON: Status: ACTIVE | Noted: 2021-12-06

## 2021-12-06 PROBLEM — Z86.0101 HISTORY OF ADENOMATOUS POLYP OF COLON: Status: ACTIVE | Noted: 2021-12-06

## 2021-12-06 PROBLEM — D12.0 BENIGN NEOPLASM OF CECUM: Status: ACTIVE | Noted: 2021-12-06

## 2021-12-09 ENCOUNTER — HOSPITAL ENCOUNTER (OUTPATIENT)
Dept: CT IMAGING | Age: 66
Discharge: HOME | End: 2021-12-09
Attending: INTERNAL MEDICINE
Payer: MEDICARE

## 2021-12-09 DIAGNOSIS — R93.3 ABNORMAL FINDING ON GI TRACT IMAGING: ICD-10-CM

## 2021-12-09 PROCEDURE — 74176 CT ABD & PELVIS W/O CONTRAST: CPT | Performed by: INTERNAL MEDICINE

## 2021-12-30 ENCOUNTER — TELEPHONE (OUTPATIENT)
Dept: FAMILY MEDICINE CLINIC | Facility: CLINIC | Age: 66
End: 2021-12-30

## 2021-12-30 DIAGNOSIS — H57.10 PAIN AROUND EYE, UNSPECIFIED LATERALITY: Primary | ICD-10-CM

## 2021-12-30 DIAGNOSIS — R73.03 BORDERLINE DIABETES: ICD-10-CM

## 2021-12-30 NOTE — TELEPHONE ENCOUNTER
Referral request Dr. Papi Hager M.D.     Six visits    Diagnosis: H57.10  R73.03    Auth per [facility] [id number] valid 12/30 to 6/28/2022

## 2022-01-03 ENCOUNTER — OFFICE VISIT (OUTPATIENT)
Dept: FAMILY MEDICINE CLINIC | Facility: CLINIC | Age: 67
End: 2022-01-03
Payer: MEDICARE

## 2022-01-03 VITALS
BODY MASS INDEX: 40.82 KG/M2 | TEMPERATURE: 98 F | SYSTOLIC BLOOD PRESSURE: 138 MMHG | HEART RATE: 78 BPM | HEIGHT: 66 IN | DIASTOLIC BLOOD PRESSURE: 82 MMHG | RESPIRATION RATE: 16 BRPM | WEIGHT: 254 LBS

## 2022-01-03 DIAGNOSIS — E66.01 OBESITY, CLASS III, BMI 40-49.9 (MORBID OBESITY) (HCC): ICD-10-CM

## 2022-01-03 DIAGNOSIS — R73.03 PREDIABETES: ICD-10-CM

## 2022-01-03 DIAGNOSIS — Z51.81 ENCOUNTER FOR THERAPEUTIC DRUG MONITORING: ICD-10-CM

## 2022-01-03 DIAGNOSIS — I10 ESSENTIAL HYPERTENSION, BENIGN: Primary | ICD-10-CM

## 2022-01-03 PROCEDURE — 3075F SYST BP GE 130 - 139MM HG: CPT | Performed by: FAMILY MEDICINE

## 2022-01-03 PROCEDURE — 3008F BODY MASS INDEX DOCD: CPT | Performed by: FAMILY MEDICINE

## 2022-01-03 PROCEDURE — 3079F DIAST BP 80-89 MM HG: CPT | Performed by: FAMILY MEDICINE

## 2022-01-03 PROCEDURE — 99214 OFFICE O/P EST MOD 30 MIN: CPT | Performed by: FAMILY MEDICINE

## 2022-01-03 RX ORDER — LISINOPRIL AND HYDROCHLOROTHIAZIDE 25; 20 MG/1; MG/1
1 TABLET ORAL DAILY
Qty: 90 TABLET | Refills: 1 | Status: SHIPPED | OUTPATIENT
Start: 2022-01-03

## 2022-01-03 RX ORDER — METFORMIN HYDROCHLORIDE 750 MG/1
TABLET, EXTENDED RELEASE ORAL
Qty: 90 TABLET | Refills: 1 | Status: SHIPPED | OUTPATIENT
Start: 2022-01-03

## 2022-01-03 NOTE — PROGRESS NOTES
Chief Complaint:  Patient presents with:  Blood Pressure: Follow Up     HPI:  This is a 77year old female patient presenting for Blood Pressure (Follow Up )    Has upcoming appt with neurology for memory issues. HTN: Has not been checking BP at home. Relation Age of Onset   • Cancer Sister         Breast Cancer   • Breast Cancer Sister 48        Twin sister.    • Diabetes Father    • Heart Disease Father    • Other (Other) Father    • Heart Disease Mother    • Other (NAYLA) Brother       Social History depression or anxiety    ASSESSMENT AND PLAN:  Discussed the following assessment   Problem List Items Addressed This Visit        Other    Prediabetes    Relevant Medications    metFORMIN HCl  MG Oral Tablet 24 Hr      Other Visit Diagnoses     Esse

## 2022-01-13 ENCOUNTER — OFFICE VISIT (OUTPATIENT)
Dept: NEUROLOGY | Facility: CLINIC | Age: 67
End: 2022-01-13
Payer: MEDICARE

## 2022-01-13 VITALS
HEART RATE: 86 BPM | WEIGHT: 254.38 LBS | BODY MASS INDEX: 41 KG/M2 | DIASTOLIC BLOOD PRESSURE: 86 MMHG | RESPIRATION RATE: 18 BRPM | SYSTOLIC BLOOD PRESSURE: 126 MMHG

## 2022-01-13 DIAGNOSIS — R41.3 SHORT-TERM MEMORY LOSS: ICD-10-CM

## 2022-01-13 DIAGNOSIS — G91.9 HYDROCEPHALUS, UNSPECIFIED TYPE (HCC): ICD-10-CM

## 2022-01-13 PROCEDURE — 3074F SYST BP LT 130 MM HG: CPT | Performed by: OTHER

## 2022-01-13 PROCEDURE — 3079F DIAST BP 80-89 MM HG: CPT | Performed by: OTHER

## 2022-01-13 PROCEDURE — 99204 OFFICE O/P NEW MOD 45 MIN: CPT | Performed by: OTHER

## 2022-01-13 RX ORDER — LORAZEPAM 0.5 MG/1
0.5 TABLET ORAL ONCE
Qty: 2 TABLET | Refills: 0 | Status: SHIPPED | OUTPATIENT
Start: 2022-01-13 | End: 2022-01-13

## 2022-01-13 NOTE — PROGRESS NOTES
HPI:    Patient ID: Emy Erickson is a 77year old female. PCP: Dr Tita Desir    HPI   Patient is a 70-year-old female with history of hypertension, diabetes, depression, congenital hydrocephalus who presented for evaluation of memory loss.   Patient repor decompression      Family History   Problem Relation Age of Onset   • Cancer Sister         Breast Cancer   • Breast Cancer Sister 48        Twin sister.    • Diabetes Father    • Heart Disease Father    • Other (Other) Father    • Heart Disease Mother    • Exam  Blood pressure 126/86, pulse 86, resp. rate 18, weight 254 lb 6.4 oz (115.4 kg), last menstrual period 07/01/2007, not currently breastfeeding. General Appearance: well nourished, well developed, no apparent distress.      HEENT: Normocephalic an Dandy-Walker malformation. There is no torcular lambdoid inversion. There is no abnormal enlargement of infratentorial compartment.      ASSESSMENT/PLAN:     (R41.3) Short-term memory loss      (G91.9) Hydrocephalus, unspecified type ([address]  Plan: MRI BRAIN

## 2022-01-13 NOTE — PATIENT INSTRUCTIONS
After your visit at the [facility] office  today, please direct any follow up questions or medication needs to the staff in our [place] office so that your concerns may be promptly addressed.   We are available through [employer] or at the numbers below: must be picked up in office. • Please allow the office 2-3 business days to fill the prescription. • Patient must present photo ID at time of . PLEASE NOTE: PRESCRIPTIONS MUST BE PICKED UP PRIOR TO 3:00PM MONDAY-FRIDAY    Scheduling Tests:     If submitting forms to office staff. • Form completion may require an additional fee. • A signed Release of Information (ANIL) must be on file before forms may be submitted. When dropping off forms, please ask the  for this paper.    • Failure

## 2022-01-13 NOTE — PROGRESS NOTES
Patient states has been noticing trouble with short-term memory for the past 6 months. Had 2 back surgeries and has poor balance.

## 2022-01-29 ENCOUNTER — HOSPITAL ENCOUNTER (OUTPATIENT)
Dept: MRI IMAGING | Facility: HOSPITAL | Age: 67
Discharge: HOME | End: 2022-01-29
Attending: Other
Payer: MEDICARE

## 2022-01-29 DIAGNOSIS — G91.9 HYDROCEPHALUS, UNSPECIFIED TYPE (HCC): ICD-10-CM

## 2022-01-29 PROCEDURE — 70551 MRI BRAIN STEM W/O DYE: CPT | Performed by: OTHER

## 2022-02-02 NOTE — TELEPHONE ENCOUNTER
Pre-Certification initiated by faxing form to [facility] [place] Counseling:  I discussed with the patient the risks of Wartpeel including but not limited to erythema, scaling, itching, weeping, crusting, and pain.

## 2022-02-24 ENCOUNTER — OFFICE VISIT (OUTPATIENT)
Dept: NEUROLOGY | Facility: CLINIC | Age: 67
End: 2022-02-24
Payer: MEDICARE

## 2022-02-24 VITALS
BODY MASS INDEX: 41 KG/M2 | RESPIRATION RATE: 20 BRPM | HEART RATE: 88 BPM | SYSTOLIC BLOOD PRESSURE: 138 MMHG | DIASTOLIC BLOOD PRESSURE: 80 MMHG | WEIGHT: 252 LBS

## 2022-02-24 DIAGNOSIS — R41.3 SHORT-TERM MEMORY LOSS: ICD-10-CM

## 2022-02-24 DIAGNOSIS — Q03.9 CONGENITAL HYDROCEPHALUS, UNSPECIFIED (HCC): Primary | ICD-10-CM

## 2022-02-24 PROCEDURE — 3075F SYST BP GE 130 - 139MM HG: CPT | Performed by: OTHER

## 2022-02-24 PROCEDURE — 3079F DIAST BP 80-89 MM HG: CPT | Performed by: OTHER

## 2022-02-24 PROCEDURE — 99213 OFFICE O/P EST LOW 20 MIN: CPT | Performed by: OTHER

## 2022-02-25 ENCOUNTER — TELEPHONE (OUTPATIENT)
Dept: SURGERY | Facility: CLINIC | Age: 67
End: 2022-02-25

## 2022-02-25 NOTE — TELEPHONE ENCOUNTER
Pt called to ask who she needs to contact to get new tubing for her CPAP machine. Pt has medicare. Please advise.

## 2022-04-07 ENCOUNTER — TELEPHONE (OUTPATIENT)
Dept: FAMILY MEDICINE CLINIC | Facility: CLINIC | Age: 67
End: 2022-04-07

## 2022-04-07 RX ORDER — LATANOPROST 50 UG/ML
SOLUTION/ DROPS OPHTHALMIC
COMMUNITY
Start: 2022-03-31

## 2022-04-07 NOTE — TELEPHONE ENCOUNTER
Pt was traveling and lost some of her medications.  Is in need of Wellbutrin,Diclofenac,Lisinopril/Hydrochlorothiazide and Metformin

## 2022-04-11 ENCOUNTER — TELEPHONE (OUTPATIENT)
Dept: FAMILY MEDICINE CLINIC | Facility: CLINIC | Age: 67
End: 2022-04-11

## 2022-04-11 RX ORDER — BUPROPION HYDROCHLORIDE 150 MG/1
TABLET ORAL
Qty: 90 TABLET | Refills: 0 | Status: SHIPPED | OUTPATIENT
Start: 2022-04-11

## 2022-04-11 NOTE — TELEPHONE ENCOUNTER
Pt is requesting her Bupropion needs to be refilled early she lost her medication he is just making sure we are aware of the early refill. We are see TE 4/7/22.

## 2022-04-13 RX ORDER — BUPROPION HYDROCHLORIDE 150 MG/1
TABLET ORAL
Qty: 90 TABLET | Refills: 0 | OUTPATIENT
Start: 2022-04-13

## 2022-04-28 ENCOUNTER — HOSPITAL ENCOUNTER (OUTPATIENT)
Dept: MAMMOGRAPHY | Age: 67
Discharge: HOME | End: 2022-04-28
Attending: OBSTETRICS & GYNECOLOGY
Payer: MEDICARE

## 2022-04-28 DIAGNOSIS — Z01.419 ENCOUNTER FOR GYNECOLOGICAL EXAMINATION WITHOUT ABNORMAL FINDING: ICD-10-CM

## 2022-04-28 PROCEDURE — 77067 SCR MAMMO BI INCL CAD: CPT | Performed by: OBSTETRICS & GYNECOLOGY

## 2022-04-28 PROCEDURE — 77063 BREAST TOMOSYNTHESIS BI: CPT | Performed by: OBSTETRICS & GYNECOLOGY

## 2022-05-26 RX ORDER — PREGABALIN 25 MG/1
25 CAPSULE ORAL 2 TIMES DAILY
Qty: 180 CAPSULE | Refills: 0 | Status: SHIPPED | OUTPATIENT
Start: 2022-05-26

## 2022-05-27 RX ORDER — PREGABALIN 25 MG/1
CAPSULE ORAL
Qty: 60 CAPSULE | Refills: 0 | OUTPATIENT
Start: 2022-05-27

## 2022-07-03 ENCOUNTER — LAB ENCOUNTER (OUTPATIENT)
Dept: LAB | Facility: HOSPITAL | Age: 67
End: 2022-07-03
Attending: FAMILY MEDICINE
Payer: MEDICARE

## 2022-07-03 DIAGNOSIS — I10 ESSENTIAL HYPERTENSION, BENIGN: ICD-10-CM

## 2022-07-03 LAB
ANION GAP SERPL CALC-SCNC: 6 MMOL/L (ref 0–18)
BUN BLD-MCNC: 25 MG/DL (ref 7–18)
CALCIUM BLD-MCNC: 10.8 MG/DL (ref 8.5–10.1)
CHLORIDE SERPL-SCNC: 107 MMOL/L (ref 98–112)
CO2 SERPL-SCNC: 28 MMOL/L (ref 21–32)
CREAT BLD-MCNC: 1.24 MG/DL
FASTING STATUS PATIENT QL REPORTED: YES
GLUCOSE BLD-MCNC: 110 MG/DL (ref 70–99)
OSMOLALITY SERPL CALC.SUM OF ELEC: 297 MOSM/KG (ref 275–295)
POTASSIUM SERPL-SCNC: 4 MMOL/L (ref 3.5–5.1)
SODIUM SERPL-SCNC: 141 MMOL/L (ref 136–145)

## 2022-07-03 PROCEDURE — 80048 BASIC METABOLIC PNL TOTAL CA: CPT

## 2022-07-03 PROCEDURE — 36415 COLL VENOUS BLD VENIPUNCTURE: CPT

## 2022-07-05 ENCOUNTER — OFFICE VISIT (OUTPATIENT)
Dept: FAMILY MEDICINE CLINIC | Facility: CLINIC | Age: 67
End: 2022-07-05
Payer: MEDICARE

## 2022-07-05 ENCOUNTER — TELEPHONE (OUTPATIENT)
Dept: FAMILY MEDICINE CLINIC | Facility: CLINIC | Age: 67
End: 2022-07-05

## 2022-07-05 VITALS
HEIGHT: 66 IN | BODY MASS INDEX: 40.18 KG/M2 | DIASTOLIC BLOOD PRESSURE: 80 MMHG | RESPIRATION RATE: 16 BRPM | TEMPERATURE: 97 F | HEART RATE: 78 BPM | SYSTOLIC BLOOD PRESSURE: 124 MMHG | WEIGHT: 250 LBS

## 2022-07-05 DIAGNOSIS — E83.52 HYPERCALCEMIA: ICD-10-CM

## 2022-07-05 DIAGNOSIS — E83.52 HYPERCALCEMIA: Primary | ICD-10-CM

## 2022-07-05 DIAGNOSIS — N17.9 AKI (ACUTE KIDNEY INJURY) (HCC): ICD-10-CM

## 2022-07-05 DIAGNOSIS — F32.1 CURRENT MODERATE EPISODE OF MAJOR DEPRESSIVE DISORDER WITHOUT PRIOR EPISODE (HCC): ICD-10-CM

## 2022-07-05 DIAGNOSIS — R41.3 MEMORY DIFFICULTIES: ICD-10-CM

## 2022-07-05 DIAGNOSIS — R73.03 PREDIABETES: ICD-10-CM

## 2022-07-05 DIAGNOSIS — I10 ESSENTIAL HYPERTENSION, BENIGN: Primary | ICD-10-CM

## 2022-07-05 PROCEDURE — 3074F SYST BP LT 130 MM HG: CPT | Performed by: FAMILY MEDICINE

## 2022-07-05 PROCEDURE — 99214 OFFICE O/P EST MOD 30 MIN: CPT | Performed by: FAMILY MEDICINE

## 2022-07-05 PROCEDURE — 3008F BODY MASS INDEX DOCD: CPT | Performed by: FAMILY MEDICINE

## 2022-07-05 PROCEDURE — 3079F DIAST BP 80-89 MM HG: CPT | Performed by: FAMILY MEDICINE

## 2022-07-05 RX ORDER — HYDROCHLOROTHIAZIDE 12.5 MG/1
12.5 TABLET ORAL DAILY
Qty: 90 TABLET | Refills: 0 | Status: SHIPPED | OUTPATIENT
Start: 2022-07-05

## 2022-07-05 RX ORDER — LISINOPRIL 20 MG/1
20 TABLET ORAL DAILY
Qty: 90 TABLET | Refills: 0 | Status: SHIPPED | OUTPATIENT
Start: 2022-07-05

## 2022-07-05 NOTE — TELEPHONE ENCOUNTER
Patient is requesting her recent lab order be sent to [facility] she believed she made a mistake in asking for [facility] location.

## 2022-07-05 NOTE — PATIENT INSTRUCTIONS
STOP lisinopril and hydrochlorothiazide combination and instead started lisinopril 20mg AND hydrochlorothiazide 12.5mg tablets separately. Check blood work in 2-3 weeks. Follow up in 2-3 months.

## 2022-07-27 LAB
BUN/CREATININE RATIO: 24 (CALC) (ref 6–22)
BUN: 28 MG/DL (ref 7–25)
CALCIUM: 10.9 MG/DL (ref 8.6–10.4)
CARBON DIOXIDE: 29 MMOL/L (ref 20–32)
CHLORIDE: 105 MMOL/L (ref 98–110)
CREATININE: 1.19 MG/DL (ref 0.5–1.05)
EGFR: 50 ML/MIN/1.73M2
GLUCOSE: 95 MG/DL (ref 65–99)
POTASSIUM: 4.1 MMOL/L (ref 3.5–5.3)
SODIUM: 141 MMOL/L (ref 135–146)

## 2022-07-28 ENCOUNTER — TELEPHONE (OUTPATIENT)
Dept: FAMILY MEDICINE CLINIC | Facility: CLINIC | Age: 67
End: 2022-07-28

## 2022-07-28 NOTE — TELEPHONE ENCOUNTER
After speaking with Joey Silva we do not do referrals for personal trainers She would need to set that up with a Health club.  Pt informed

## 2022-08-01 ENCOUNTER — TELEPHONE (OUTPATIENT)
Dept: FAMILY MEDICINE CLINIC | Facility: CLINIC | Age: 67
End: 2022-08-01

## 2022-08-01 DIAGNOSIS — Z00.00 ROUTINE HEALTH MAINTENANCE: Primary | ICD-10-CM

## 2022-08-01 DIAGNOSIS — R73.03 PREDIABETES: ICD-10-CM

## 2022-08-01 NOTE — TELEPHONE ENCOUNTER
Please enter lab orders for the patient's upcoming physical appointment. Physical scheduled: Your appointments     Date & Time Appointment Department [facility])    Oct 06, 2022 11:00 AM CDT MA Supervisit with Alec Carrera DO [facility], [address], [place]  ([facility])            [facility] [address] [place] [address] [facility] [phone]         [facility])     The patient has been notified to complete fasting labs prior to their physical appointment.

## 2022-08-03 DIAGNOSIS — E66.01 OBESITY, CLASS III, BMI 40-49.9 (MORBID OBESITY) (HCC): ICD-10-CM

## 2022-08-03 DIAGNOSIS — F32.1 CURRENT MODERATE EPISODE OF MAJOR DEPRESSIVE DISORDER WITHOUT PRIOR EPISODE (HCC): ICD-10-CM

## 2022-08-04 RX ORDER — BUPROPION HYDROCHLORIDE 150 MG/1
150 TABLET ORAL DAILY
Qty: 90 TABLET | Refills: 1 | Status: SHIPPED | OUTPATIENT
Start: 2022-08-04 | End: 2023-01-30

## 2022-08-08 DIAGNOSIS — E83.52 HYPERCALCEMIA: Primary | ICD-10-CM

## 2022-08-12 ENCOUNTER — TELEPHONE (OUTPATIENT)
Dept: FAMILY MEDICINE CLINIC | Facility: CLINIC | Age: 67
End: 2022-08-12

## 2022-08-12 NOTE — TELEPHONE ENCOUNTER
Call made to discuss. Patient reports a hard time walking far distances and using public transportation. She also does not drive down town. Please advise on request. .

## 2022-08-12 NOTE — TELEPHONE ENCOUNTER
Pt calling to [facility] note to exempt her from jury duty on 9/19/22. Pt will  note once it has been completed.

## 2022-08-13 NOTE — TELEPHONE ENCOUNTER
Letter sent to [employer]. We will see if they will excuse her based on this.   Tammy Gallagher, DO

## 2022-08-17 ENCOUNTER — TELEPHONE (OUTPATIENT)
Dept: FAMILY MEDICINE CLINIC | Facility: CLINIC | Age: 67
End: 2022-08-17

## 2022-08-17 NOTE — TELEPHONE ENCOUNTER
dropped off placard form please call when completed.  form placed in triage  Call [phone] when ready

## 2022-08-18 ENCOUNTER — TELEPHONE (OUTPATIENT)
Dept: FAMILY MEDICINE CLINIC | Facility: CLINIC | Age: 67
End: 2022-08-18

## 2022-08-18 DIAGNOSIS — R73.03 PREDIABETES: ICD-10-CM

## 2022-08-18 DIAGNOSIS — Z51.81 ENCOUNTER FOR THERAPEUTIC DRUG MONITORING: ICD-10-CM

## 2022-08-18 DIAGNOSIS — E66.01 OBESITY, CLASS III, BMI 40-49.9 (MORBID OBESITY) (HCC): ICD-10-CM

## 2022-08-18 RX ORDER — METFORMIN HYDROCHLORIDE 750 MG/1
TABLET, EXTENDED RELEASE ORAL
Qty: 90 TABLET | Refills: 1 | Status: SHIPPED | OUTPATIENT
Start: 2022-08-18

## 2022-08-22 NOTE — TELEPHONE ENCOUNTER
Called patient and left message on machine letting her know form was ready.  Original placed in drawer at  for , copy sent to scan and copy placed in tickler

## 2022-09-28 ENCOUNTER — TELEPHONE (OUTPATIENT)
Dept: FAMILY MEDICINE CLINIC | Facility: CLINIC | Age: 67
End: 2022-09-28

## 2022-09-28 NOTE — TELEPHONE ENCOUNTER
Pt called to answer her Medicare Annual questionnaire.   Placed in [facility]

## 2022-10-07 DIAGNOSIS — I10 ESSENTIAL HYPERTENSION, BENIGN: ICD-10-CM

## 2022-10-10 RX ORDER — HYDROCHLOROTHIAZIDE 12.5 MG/1
TABLET ORAL
Qty: 90 TABLET | Refills: 0 | Status: SHIPPED | OUTPATIENT
Start: 2022-10-10

## 2022-10-19 ENCOUNTER — TELEPHONE (OUTPATIENT)
Dept: FAMILY MEDICINE CLINIC | Facility: CLINIC | Age: 67
End: 2022-10-19

## 2022-10-19 NOTE — TELEPHONE ENCOUNTER
calling with concerns about patient. He has not voiced his concerns to patient due to how she gets. He states she has some depression, is seeing a counselor every couple weeks and may be on 1-2 medications for this. During his own physical he said Dr. Nikos Reed was looking for ideas about his wife that she could better understand the issue. He is scared that his concern would get back to patient and the situation it may cause. He states patient is very happy to others but no one has seen her upset and mad like he has. Disagreements always bring up divorce and he would rather avoid this. He confirms he does not come to her appointments and discussed that issues regarding patient will be noted in her chart. Advised I would discuss with Dr. Nikos Reed and be in touch. Patient has upcoming appointment 10/27. Please advise.

## 2022-10-20 NOTE — TELEPHONE ENCOUNTER
Thank you for the update. I will address concerns with the patient (not mentioning 's contact/concerns) at her upcoming appt.

## 2022-10-27 ENCOUNTER — OFFICE VISIT (OUTPATIENT)
Dept: FAMILY MEDICINE CLINIC | Facility: CLINIC | Age: 67
End: 2022-10-27
Payer: MEDICARE

## 2022-10-27 VITALS
HEART RATE: 80 BPM | RESPIRATION RATE: 16 BRPM | TEMPERATURE: 98 F | BODY MASS INDEX: 39.7 KG/M2 | HEIGHT: 66 IN | DIASTOLIC BLOOD PRESSURE: 100 MMHG | WEIGHT: 247 LBS | SYSTOLIC BLOOD PRESSURE: 154 MMHG

## 2022-10-27 DIAGNOSIS — G91.9 HYDROCEPHALUS, UNSPECIFIED TYPE (HCC): ICD-10-CM

## 2022-10-27 DIAGNOSIS — N18.31 STAGE 3A CHRONIC KIDNEY DISEASE (HCC): Chronic | ICD-10-CM

## 2022-10-27 DIAGNOSIS — Z00.00 ENCOUNTER FOR ANNUAL HEALTH EXAMINATION: Primary | ICD-10-CM

## 2022-10-27 DIAGNOSIS — R73.03 PREDIABETES: ICD-10-CM

## 2022-10-27 DIAGNOSIS — F32.1 CURRENT MODERATE EPISODE OF MAJOR DEPRESSIVE DISORDER WITHOUT PRIOR EPISODE (HCC): ICD-10-CM

## 2022-10-27 DIAGNOSIS — Z23 NEED FOR VACCINATION: ICD-10-CM

## 2022-10-27 DIAGNOSIS — M54.16 BILATERAL LUMBAR RADICULOPATHY: ICD-10-CM

## 2022-10-27 DIAGNOSIS — M48.062 SPINAL STENOSIS OF LUMBAR REGION WITH NEUROGENIC CLAUDICATION: ICD-10-CM

## 2022-10-27 DIAGNOSIS — E66.01 CLASS 3 SEVERE OBESITY WITHOUT SERIOUS COMORBIDITY WITH BODY MASS INDEX (BMI) OF 40.0 TO 44.9 IN ADULT, UNSPECIFIED OBESITY TYPE (HCC): ICD-10-CM

## 2022-10-27 DIAGNOSIS — K21.9 GASTROESOPHAGEAL REFLUX DISEASE, UNSPECIFIED WHETHER ESOPHAGITIS PRESENT: ICD-10-CM

## 2022-10-27 DIAGNOSIS — E78.5 HYPERLIPIDEMIA, UNSPECIFIED HYPERLIPIDEMIA TYPE: ICD-10-CM

## 2022-10-27 DIAGNOSIS — I10 ESSENTIAL HYPERTENSION: ICD-10-CM

## 2022-10-27 DIAGNOSIS — G47.33 OSA (OBSTRUCTIVE SLEEP APNEA): ICD-10-CM

## 2022-10-27 PROBLEM — D12.0 BENIGN NEOPLASM OF CECUM: Status: RESOLVED | Noted: 2021-12-06 | Resolved: 2022-10-27

## 2022-10-27 PROBLEM — Z86.010 HISTORY OF ADENOMATOUS POLYP OF COLON: Status: RESOLVED | Noted: 2021-12-06 | Resolved: 2022-10-27

## 2022-10-27 PROBLEM — K63.5 COLON POLYP: Status: RESOLVED | Noted: 2020-01-04 | Resolved: 2022-10-27

## 2022-10-27 PROBLEM — D12.2 BENIGN NEOPLASM OF ASCENDING COLON: Status: RESOLVED | Noted: 2021-12-06 | Resolved: 2022-10-27

## 2022-10-27 PROBLEM — Z86.0101 HISTORY OF ADENOMATOUS POLYP OF COLON: Status: RESOLVED | Noted: 2021-12-06 | Resolved: 2022-10-27

## 2022-10-27 NOTE — PATIENT INSTRUCTIONS
Recommend starting claritin vs gillian Green's SCREENING SCHEDULE   Tests on this list are recommended by your physician but may not be covered, or covered at this frequency, by your insurer. Please check with your insurance carrier before scheduling to verify coverage. PREVENTATIVE SERVICES FREQUENCY &  COVERAGE DETAILS LAST COMPLETION DATE   Diabetes Screening    Fasting Blood Sugar /  Glucose    One screening every 12 months if never tested or if previously tested but not diagnosed with pre-diabetes   One screening every 6 months if diagnosed with pre-diabetes Lab Results   Component Value Date    GLU 95 07/26/2022        Cardiovascular Disease Screening    Lipid Panel  Cholesterol  Lipoprotein (HDL)  Triglycerides Covered every 5 years for all Medicare beneficiaries without apparent signs or symptoms of cardiovascular disease Lab Results   Component Value Date    CHOLEST 231 (H) 10/21/2021    HDL 69 (H) 10/21/2021     (H) 10/21/2021    TRIG 90 10/21/2021         Electrocardiogram (EKG)   Covered if needed at Welcome to Medicare, and non-screening if indicated for medical reasons 07/07/2016      Ultrasound Screening for Abdominal Aortic Aneurysm (AAA) Covered once in a lifetime for one of the following risk factors    Men who are 73-68 years old and have ever smoked    Anyone with a family history -     Colorectal Cancer Screening  Covered for ages 52-80; only need ONE of the following:    Colonoscopy   Covered every 10 years    Covered every 2 years if patient is at high risk or previous colonoscopy was abnormal 12/06/2021    Colorectal Cancer Screening due on 12/06/2028    Flexible Sigmoidoscopy   Covered every 4 years -    Fecal Occult Blood Test Covered annually -   Bone Density Screening    Bone density screening    Covered every 2 years after age 72 if diagnosed with risk of osteoporosis or estrogen deficiency.     Covered yearly for long-term glucocorticoid medication use (Steroids) Last Dexa Scan:    XR DEXA BONE DENSITOMETRY (CPT=77080) 11/11/2021      No recommendations at this time   Pap and Pelvic    Pap   Covered every 2 years for women at normal risk;  Annually if at high risk -  No recommendations at this time    Chlamydia Annually if high risk -  No recommendations at this time   Screening Mammogram    Mammogram     Recommend annually for all female patients aged 36 and older    One baseline mammogram covered for patients aged 32-38 04/28/2022    Mammogram due on 04/28/2023    Immunizations    Influenza Covered once per flu season  Please get every year -  Influenza Vaccine(1) due on 10/01/2022    Pneumococcal Each vaccine (Qudytjr19 & Mfkjdygyt87) covered once after 65 Prevnar 13: -    [id number]: 10/21/2021     Pneumococcal Vaccination(2 - PCV) due on 10/21/2022    Hepatitis B One screening covered for patients with certain risk factors   -  No recommendations at this time    Tetanus Toxoid Not covered by Medicare Part B unless medically necessary (cut with metal); may be covered with your pharmacy prescription benefits -    Tetanus, Diptheria and Pertusis TD and TDaP Not covered by Medicare Part B -  No recommendations at this time    Zoster Not covered by Medicare Part B; may be covered with your pharmacy  prescription benefits -  Zoster Vaccines(1 of 2) Never done       Annual Monitoring of Persistent Medications (ACE/ARB, digoxin diuretics, anticonvulsants)    Potassium Annually Lab Results   Component Value Date    K 4.1 07/26/2022         Creatinine   Annually Lab Results   Component Value Date    CREATSERUM 1.19 (H) 07/26/2022         BUN Annually Lab Results   Component Value Date    BUN 28 (H) 07/26/2022       Drug Serum Conc Annually No results found for: DIGOXIN, DIG, VALP

## 2022-11-10 ENCOUNTER — NURSE ONLY (OUTPATIENT)
Dept: FAMILY MEDICINE CLINIC | Facility: CLINIC | Age: 67
End: 2022-11-10
Payer: MEDICARE

## 2022-11-10 VITALS — DIASTOLIC BLOOD PRESSURE: 80 MMHG | SYSTOLIC BLOOD PRESSURE: 140 MMHG

## 2022-11-10 DIAGNOSIS — I10 ESSENTIAL HYPERTENSION: Primary | ICD-10-CM

## 2022-11-10 PROCEDURE — 3079F DIAST BP 80-89 MM HG: CPT | Performed by: FAMILY MEDICINE

## 2022-11-10 PROCEDURE — 3077F SYST BP >= 140 MM HG: CPT | Performed by: FAMILY MEDICINE

## 2022-11-10 NOTE — PROGRESS NOTES
Patient comes in for blood pressure check. She has no complaints. Blood pressure was taken after patient was seated and we talked for about ten minutes. Blood pressure 140/80 with large cuff right arm. Patient will continue to monitor at home and call if any questions.    This was all discussed with Dr. Kristie Perez,

## 2022-11-11 DIAGNOSIS — I10 ESSENTIAL HYPERTENSION, BENIGN: ICD-10-CM

## 2022-11-11 RX ORDER — LISINOPRIL 20 MG/1
TABLET ORAL
Qty: 90 TABLET | Refills: 0 | Status: SHIPPED | OUTPATIENT
Start: 2022-11-11

## 2022-11-21 ENCOUNTER — TELEPHONE (OUTPATIENT)
Dept: FAMILY MEDICINE CLINIC | Facility: CLINIC | Age: 67
End: 2022-11-21

## 2022-11-21 DIAGNOSIS — R73.03 BORDERLINE DIABETES: Primary | ICD-10-CM

## 2022-11-21 NOTE — TELEPHONE ENCOUNTER
Referral request Dr. Bebe Knutson M.D.    6 visits    Diagnosis: R73.03    Auth per Melissa Howard [id number] valid 11/21 to 5/19/2023

## 2022-12-07 RX ORDER — AMOXICILLIN 500 MG/1
CAPSULE ORAL
COMMUNITY
Start: 2022-12-06

## 2022-12-07 NOTE — TELEPHONE ENCOUNTER
LOV 10/27/22 with Dr Haleigh Conn not on protocol pended order and asked Dr Severo Kub to fill this.

## 2022-12-07 NOTE — TELEPHONE ENCOUNTER
patient is calling requesting DICLOFENAC 50 MG Oral Tab EC be refilled [facility]

## 2022-12-29 ENCOUNTER — TELEPHONE (OUTPATIENT)
Dept: FAMILY MEDICINE CLINIC | Facility: CLINIC | Age: 67
End: 2022-12-29

## 2022-12-29 NOTE — TELEPHONE ENCOUNTER
Pt is going to visit new born grandchild in February and wants to know if there are any shots or immunizations recommended before they go

## 2023-01-07 ENCOUNTER — TELEPHONE (OUTPATIENT)
Dept: FAMILY MEDICINE CLINIC | Facility: CLINIC | Age: 68
End: 2023-01-07

## 2023-01-11 NOTE — TELEPHONE ENCOUNTER
Med sent but patient needs updated creatinine to continue taking medication. I am concerned because kidney function was worsened last visit and this medication could contribute to that.    Labs ordered  [employer], DO

## 2023-01-11 NOTE — TELEPHONE ENCOUNTER
LMOM for Helio Garcia to return a call to triage to discuss having labs drawn that Chauncey Grant ordered.

## 2023-01-16 DIAGNOSIS — I10 ESSENTIAL HYPERTENSION, BENIGN: ICD-10-CM

## 2023-01-17 RX ORDER — HYDROCHLOROTHIAZIDE 12.5 MG/1
TABLET ORAL
Qty: 90 TABLET | Refills: 0 | Status: SHIPPED | OUTPATIENT
Start: 2023-01-17

## 2023-01-26 DIAGNOSIS — F32.1 CURRENT MODERATE EPISODE OF MAJOR DEPRESSIVE DISORDER WITHOUT PRIOR EPISODE (HCC): ICD-10-CM

## 2023-01-26 DIAGNOSIS — E66.01 OBESITY, CLASS III, BMI 40-49.9 (MORBID OBESITY) (HCC): ICD-10-CM

## 2023-01-30 RX ORDER — BUPROPION HYDROCHLORIDE 150 MG/1
TABLET ORAL
Qty: 90 TABLET | Refills: 0 | Status: SHIPPED | OUTPATIENT
Start: 2023-01-30

## 2023-02-16 DIAGNOSIS — I10 ESSENTIAL HYPERTENSION, BENIGN: ICD-10-CM

## 2023-02-16 RX ORDER — LISINOPRIL 20 MG/1
TABLET ORAL
Qty: 90 TABLET | Refills: 0 | Status: SHIPPED | OUTPATIENT
Start: 2023-02-16

## 2023-02-23 ENCOUNTER — TELEPHONE (OUTPATIENT)
Dept: FAMILY MEDICINE CLINIC | Facility: CLINIC | Age: 68
End: 2023-02-23

## 2023-02-23 NOTE — TELEPHONE ENCOUNTER
Pt  call requesting a medication send it to a dentist                                     [address], [place], [address]  Phone: [phone]  FAX [phone]  Please send a copy of medication pt is in the dental office.

## 2023-03-13 ENCOUNTER — TELEPHONE (OUTPATIENT)
Dept: FAMILY MEDICINE CLINIC | Facility: CLINIC | Age: 68
End: 2023-03-13

## 2023-03-13 DIAGNOSIS — Z98.818 OTHER DENTAL PROCEDURE STATUS: Primary | ICD-10-CM

## 2023-03-13 NOTE — TELEPHONE ENCOUNTER
Referral request Dr. Leslie Peña DMD, M.D.    4 visits    Diagnosis: Z98.818    [address] [id number] valid 3/9/23 to 4/7/2023

## 2023-03-17 ENCOUNTER — TELEPHONE (OUTPATIENT)
Dept: FAMILY MEDICINE CLINIC | Facility: CLINIC | Age: 68
End: 2023-03-17

## 2023-03-17 DIAGNOSIS — R73.03 BORDERLINE DIABETES: Primary | ICD-10-CM

## 2023-03-17 NOTE — TELEPHONE ENCOUNTER
Referral request Dr. Néstor Lynn M.D.    8 visits    Diagnosis: R73.03    Auth per Servin Franc [id number] valid 3/17/23 to 11/11/2023

## 2023-03-28 ENCOUNTER — TELEPHONE (OUTPATIENT)
Dept: FAMILY MEDICINE CLINIC | Facility: CLINIC | Age: 68
End: 2023-03-28

## 2023-03-28 DIAGNOSIS — I10 ESSENTIAL HYPERTENSION, BENIGN: ICD-10-CM

## 2023-03-28 DIAGNOSIS — R73.03 PREDIABETES: ICD-10-CM

## 2023-03-28 DIAGNOSIS — Z51.81 ENCOUNTER FOR THERAPEUTIC DRUG MONITORING: ICD-10-CM

## 2023-03-28 DIAGNOSIS — E66.01 OBESITY, CLASS III, BMI 40-49.9 (MORBID OBESITY) (HCC): ICD-10-CM

## 2023-03-28 RX ORDER — METFORMIN HYDROCHLORIDE 750 MG/1
TABLET, EXTENDED RELEASE ORAL
Qty: 30 TABLET | Refills: 0 | Status: SHIPPED | OUTPATIENT
Start: 2023-03-28

## 2023-03-28 RX ORDER — HYDROCHLOROTHIAZIDE 12.5 MG/1
12.5 TABLET ORAL DAILY
Qty: 30 TABLET | Refills: 0 | Status: SHIPPED | OUTPATIENT
Start: 2023-03-28

## 2023-03-28 RX ORDER — LISINOPRIL 20 MG/1
20 TABLET ORAL DAILY
Qty: 30 TABLET | Refills: 0 | Status: SHIPPED | OUTPATIENT
Start: 2023-03-28

## 2023-03-28 NOTE — TELEPHONE ENCOUNTER
Pt was out of town and missed place her medication. She is not sure which ones she needs.   Would like nurse to call her back to get the proper refills

## 2023-04-14 ENCOUNTER — TELEPHONE (OUTPATIENT)
Dept: FAMILY MEDICINE CLINIC | Facility: CLINIC | Age: 68
End: 2023-04-14

## 2023-04-17 ENCOUNTER — LAB ENCOUNTER (OUTPATIENT)
Dept: LAB | Age: 68
End: 2023-04-17
Attending: FAMILY MEDICINE
Payer: MEDICARE

## 2023-04-17 DIAGNOSIS — E83.52 HYPERCALCEMIA: ICD-10-CM

## 2023-04-17 DIAGNOSIS — Z00.00 ROUTINE HEALTH MAINTENANCE: ICD-10-CM

## 2023-04-17 DIAGNOSIS — R73.03 PREDIABETES: ICD-10-CM

## 2023-04-17 LAB
ALBUMIN SERPL-MCNC: 3.8 G/DL (ref 3.4–5)
ALBUMIN/GLOB SERPL: 1.1 {RATIO} (ref 1–2)
ALP LIVER SERPL-CCNC: 74 U/L
ALT SERPL-CCNC: 27 U/L
ANION GAP SERPL CALC-SCNC: 1 MMOL/L (ref 0–18)
AST SERPL-CCNC: 21 U/L (ref 15–37)
BASOPHILS # BLD AUTO: 0.04 X10(3) UL (ref 0–0.2)
BASOPHILS NFR BLD AUTO: 0.8 %
BILIRUB SERPL-MCNC: 0.7 MG/DL (ref 0.1–2)
BUN BLD-MCNC: 31 MG/DL (ref 7–18)
CALCIUM BLD-MCNC: 9.9 MG/DL (ref 8.5–10.1)
CHLORIDE SERPL-SCNC: 111 MMOL/L (ref 98–112)
CHOLEST SERPL-MCNC: 235 MG/DL (ref ?–200)
CO2 SERPL-SCNC: 28 MMOL/L (ref 21–32)
CREAT BLD-MCNC: 1.04 MG/DL
EOSINOPHIL # BLD AUTO: 0.08 X10(3) UL (ref 0–0.7)
EOSINOPHIL NFR BLD AUTO: 1.6 %
ERYTHROCYTE [DISTWIDTH] IN BLOOD BY AUTOMATED COUNT: 14.4 %
EST. AVERAGE GLUCOSE BLD GHB EST-MCNC: 123 MG/DL (ref 68–126)
FASTING PATIENT LIPID ANSWER: YES
FASTING STATUS PATIENT QL REPORTED: YES
GFR SERPLBLD BASED ON 1.73 SQ M-ARVRAT: 59 ML/MIN/1.73M2 (ref 60–?)
GLOBULIN PLAS-MCNC: 3.5 G/DL (ref 2.8–4.4)
GLUCOSE BLD-MCNC: 110 MG/DL (ref 70–99)
HBA1C MFR BLD: 5.9 % (ref ?–5.7)
HCT VFR BLD AUTO: 43.9 %
HDLC SERPL-MCNC: 66 MG/DL (ref 40–59)
HGB BLD-MCNC: 13.7 G/DL
IMM GRANULOCYTES # BLD AUTO: 0.02 X10(3) UL (ref 0–1)
IMM GRANULOCYTES NFR BLD: 0.4 %
LDLC SERPL CALC-MCNC: 152 MG/DL (ref ?–100)
LYMPHOCYTES # BLD AUTO: 1.8 X10(3) UL (ref 1–4)
LYMPHOCYTES NFR BLD AUTO: 35.2 %
MCH RBC QN AUTO: 28.3 PG (ref 26–34)
MCHC RBC AUTO-ENTMCNC: 31.2 G/DL (ref 31–37)
MCV RBC AUTO: 90.7 FL
MONOCYTES # BLD AUTO: 0.57 X10(3) UL (ref 0.1–1)
MONOCYTES NFR BLD AUTO: 11.1 %
NEUTROPHILS # BLD AUTO: 2.61 X10 (3) UL (ref 1.5–7.7)
NEUTROPHILS # BLD AUTO: 2.61 X10(3) UL (ref 1.5–7.7)
NEUTROPHILS NFR BLD AUTO: 50.9 %
NONHDLC SERPL-MCNC: 169 MG/DL (ref ?–130)
OSMOLALITY SERPL CALC.SUM OF ELEC: 297 MOSM/KG (ref 275–295)
PLATELET # BLD AUTO: 238 10(3)UL (ref 150–450)
POTASSIUM SERPL-SCNC: 4.4 MMOL/L (ref 3.5–5.1)
PROT SERPL-MCNC: 7.3 G/DL (ref 6.4–8.2)
PTH-INTACT SERPL-MCNC: 129.5 PG/ML (ref 18.5–88)
RBC # BLD AUTO: 4.84 X10(6)UL
SODIUM SERPL-SCNC: 140 MMOL/L (ref 136–145)
TRIGL SERPL-MCNC: 100 MG/DL (ref 30–149)
TSI SER-ACNC: 2.42 MIU/ML (ref 0.36–3.74)
VLDLC SERPL CALC-MCNC: 19 MG/DL (ref 0–30)
WBC # BLD AUTO: 5.1 X10(3) UL (ref 4–11)

## 2023-04-17 PROCEDURE — 83036 HEMOGLOBIN GLYCOSYLATED A1C: CPT

## 2023-04-17 PROCEDURE — 83970 ASSAY OF PARATHORMONE: CPT

## 2023-04-17 PROCEDURE — 80061 LIPID PANEL: CPT

## 2023-04-17 PROCEDURE — 80053 COMPREHEN METABOLIC PANEL: CPT

## 2023-04-17 PROCEDURE — 84443 ASSAY THYROID STIM HORMONE: CPT

## 2023-04-17 PROCEDURE — 85025 COMPLETE CBC W/AUTO DIFF WBC: CPT

## 2023-04-17 PROCEDURE — 36415 COLL VENOUS BLD VENIPUNCTURE: CPT

## 2023-04-25 ENCOUNTER — TELEPHONE (OUTPATIENT)
Dept: FAMILY MEDICINE CLINIC | Facility: CLINIC | Age: 68
End: 2023-04-25

## 2023-04-25 DIAGNOSIS — E34.9 ELEVATED PARATHYROID HORMONE: Primary | ICD-10-CM

## 2023-04-25 NOTE — TELEPHONE ENCOUNTER
----- Message from Bryson Jeans, DO sent at 4/22/2023  1:58 AM CDT -----  It looks like you are due for a follow up, so let's schedule in the office to review the labs. -A1c is elevated but improved. -PTH is elevated. This is indicative of an overactive parathyroid gland. We need to follow up on this with a referral to an ENT to look into it further.   -Kidney numbers are improved. -Cholesterol is elevated. We may want to discuss a statin medication to lower this. Please let me know if you have any questions.   Bryson Jeans, DO 4/22/2023 1:58 AM

## 2023-04-28 ENCOUNTER — OFFICE VISIT (OUTPATIENT)
Dept: FAMILY MEDICINE CLINIC | Facility: CLINIC | Age: 68
End: 2023-04-28
Payer: MEDICARE

## 2023-04-28 VITALS
OXYGEN SATURATION: 99 % | SYSTOLIC BLOOD PRESSURE: 138 MMHG | BODY MASS INDEX: 39.7 KG/M2 | DIASTOLIC BLOOD PRESSURE: 90 MMHG | HEART RATE: 100 BPM | HEIGHT: 66 IN | WEIGHT: 247 LBS

## 2023-04-28 DIAGNOSIS — J01.10 ACUTE NON-RECURRENT FRONTAL SINUSITIS: Primary | ICD-10-CM

## 2023-04-28 PROCEDURE — 3008F BODY MASS INDEX DOCD: CPT | Performed by: STUDENT IN AN ORGANIZED HEALTH CARE EDUCATION/TRAINING PROGRAM

## 2023-04-28 PROCEDURE — 3080F DIAST BP >= 90 MM HG: CPT | Performed by: STUDENT IN AN ORGANIZED HEALTH CARE EDUCATION/TRAINING PROGRAM

## 2023-04-28 PROCEDURE — 3075F SYST BP GE 130 - 139MM HG: CPT | Performed by: STUDENT IN AN ORGANIZED HEALTH CARE EDUCATION/TRAINING PROGRAM

## 2023-04-28 PROCEDURE — 99213 OFFICE O/P EST LOW 20 MIN: CPT | Performed by: STUDENT IN AN ORGANIZED HEALTH CARE EDUCATION/TRAINING PROGRAM

## 2023-04-28 PROCEDURE — 1125F AMNT PAIN NOTED PAIN PRSNT: CPT | Performed by: STUDENT IN AN ORGANIZED HEALTH CARE EDUCATION/TRAINING PROGRAM

## 2023-04-28 RX ORDER — AMOXICILLIN AND CLAVULANATE POTASSIUM 875; 125 MG/1; MG/1
1 TABLET, FILM COATED ORAL 2 TIMES DAILY
Qty: 14 TABLET | Refills: 0 | Status: SHIPPED | OUTPATIENT
Start: 2023-05-05 | End: 2023-05-12

## 2023-04-28 NOTE — PATIENT INSTRUCTIONS
Gargle with warm salt water solution 3-5 times daily. Dissolve 1/2 teaspoon salt in half cup of warm tap water. Gargle and spit. Try a premixed saline nasal spray, available over the counter, such as [place] Nasal Spray (or generic equivalent), 2-4 times daily. Do one spray each nostril and gently blow nose after. (You should discard this once you are feeling better and use a new bottle for any future illnesses)    Get plenty of rest and drink extra fluids. Take all antibiotics as prescribed. Do not stop taking them, even if you feel better. If you are willing to use it, I highly recommend using a more complete saline sinus wash device such as: SinuCleanse Nasal wash squeeze bottle, Neti-Pot, Neilmed nasal wash or similar device. These are all available over the counter. Follow manufacturers instructions and be sure to use DISTILLED WATER or tap water that you have boiled. Use this 1-2 times daily (in the morning, after work, before bed etc). I prefer the \"squeeze bottle\" variety over the \"teapot\" style, as it allows for better cleansing. If you use the full sinus rinse device, as above, you may also still use the [place] Nasal spray (or generic equivalent) during the day while at work, school or in between full sinus washes. Writer discussed with patient about an Advanced Medical Directive. Pt declined.

## 2023-05-01 ENCOUNTER — TELEPHONE (OUTPATIENT)
Dept: FAMILY MEDICINE CLINIC | Facility: CLINIC | Age: 68
End: 2023-05-01

## 2023-05-01 NOTE — TELEPHONE ENCOUNTER
Pt is calling her sinus infection is getting worse and now has green congestion  coming out of her nose. She saw Dr. Bartolo Mckenna on 4/28/23.

## 2023-05-08 ENCOUNTER — OFFICE VISIT (OUTPATIENT)
Dept: FAMILY MEDICINE CLINIC | Facility: CLINIC | Age: 68
End: 2023-05-08
Payer: MEDICARE

## 2023-05-08 VITALS
WEIGHT: 248.81 LBS | RESPIRATION RATE: 18 BRPM | HEIGHT: 66 IN | OXYGEN SATURATION: 98 % | HEART RATE: 86 BPM | SYSTOLIC BLOOD PRESSURE: 162 MMHG | BODY MASS INDEX: 39.99 KG/M2 | DIASTOLIC BLOOD PRESSURE: 100 MMHG

## 2023-05-08 DIAGNOSIS — N18.31 STAGE 3A CHRONIC KIDNEY DISEASE (HCC): ICD-10-CM

## 2023-05-08 DIAGNOSIS — F32.1 CURRENT MODERATE EPISODE OF MAJOR DEPRESSIVE DISORDER WITHOUT PRIOR EPISODE (HCC): ICD-10-CM

## 2023-05-08 DIAGNOSIS — R73.03 PREDIABETES: ICD-10-CM

## 2023-05-08 DIAGNOSIS — G91.9 HYDROCEPHALUS, UNSPECIFIED TYPE (HCC): ICD-10-CM

## 2023-05-08 DIAGNOSIS — J01.10 ACUTE NON-RECURRENT FRONTAL SINUSITIS: Primary | ICD-10-CM

## 2023-05-08 DIAGNOSIS — E78.2 MIXED HYPERLIPIDEMIA: ICD-10-CM

## 2023-05-08 DIAGNOSIS — Z51.81 ENCOUNTER FOR THERAPEUTIC DRUG MONITORING: ICD-10-CM

## 2023-05-08 DIAGNOSIS — E66.01 OBESITY, CLASS III, BMI 40-49.9 (MORBID OBESITY) (HCC): ICD-10-CM

## 2023-05-08 DIAGNOSIS — Q03.9 CONGENITAL HYDROCEPHALUS, UNSPECIFIED (HCC): ICD-10-CM

## 2023-05-08 DIAGNOSIS — E21.3 HYPERPARATHYROIDISM (HCC): ICD-10-CM

## 2023-05-08 DIAGNOSIS — I10 ESSENTIAL HYPERTENSION: ICD-10-CM

## 2023-05-08 PROCEDURE — 1159F MED LIST DOCD IN RCRD: CPT | Performed by: FAMILY MEDICINE

## 2023-05-08 PROCEDURE — 1160F RVW MEDS BY RX/DR IN RCRD: CPT | Performed by: FAMILY MEDICINE

## 2023-05-08 PROCEDURE — 3077F SYST BP >= 140 MM HG: CPT | Performed by: FAMILY MEDICINE

## 2023-05-08 PROCEDURE — 3008F BODY MASS INDEX DOCD: CPT | Performed by: FAMILY MEDICINE

## 2023-05-08 PROCEDURE — 3080F DIAST BP >= 90 MM HG: CPT | Performed by: FAMILY MEDICINE

## 2023-05-08 PROCEDURE — 99214 OFFICE O/P EST MOD 30 MIN: CPT | Performed by: FAMILY MEDICINE

## 2023-05-08 RX ORDER — ROSUVASTATIN CALCIUM 5 MG/1
5 TABLET, COATED ORAL NIGHTLY
Qty: 90 TABLET | Refills: 0 | Status: SHIPPED | OUTPATIENT
Start: 2023-05-08

## 2023-05-08 RX ORDER — AMOXICILLIN AND CLAVULANATE POTASSIUM 875; 125 MG/1; MG/1
1 TABLET, FILM COATED ORAL 2 TIMES DAILY
Qty: 14 TABLET | Refills: 0 | Status: SHIPPED | OUTPATIENT
Start: 2023-05-08 | End: 2023-05-15

## 2023-05-08 RX ORDER — METFORMIN HYDROCHLORIDE 750 MG/1
TABLET, EXTENDED RELEASE ORAL
Qty: 90 TABLET | Refills: 1 | Status: SHIPPED | OUTPATIENT
Start: 2023-05-08 | End: 2023-05-17

## 2023-05-17 DIAGNOSIS — Z51.81 ENCOUNTER FOR THERAPEUTIC DRUG MONITORING: ICD-10-CM

## 2023-05-17 DIAGNOSIS — R73.03 PREDIABETES: ICD-10-CM

## 2023-05-17 DIAGNOSIS — E66.01 OBESITY, CLASS III, BMI 40-49.9 (MORBID OBESITY) (HCC): ICD-10-CM

## 2023-05-17 RX ORDER — METFORMIN HYDROCHLORIDE 750 MG/1
TABLET, EXTENDED RELEASE ORAL
Qty: 90 TABLET | Refills: 0 | Status: SHIPPED | OUTPATIENT
Start: 2023-05-17

## 2023-05-19 ENCOUNTER — HOSPITAL ENCOUNTER (OUTPATIENT)
Dept: MAMMOGRAPHY | Age: 68
Discharge: HOME | End: 2023-05-19
Attending: OBSTETRICS & GYNECOLOGY
Payer: MEDICARE

## 2023-05-19 DIAGNOSIS — Z01.419 ENCOUNTER FOR GYNECOLOGICAL EXAMINATION WITHOUT ABNORMAL FINDING: ICD-10-CM

## 2023-05-19 PROCEDURE — 77067 SCR MAMMO BI INCL CAD: CPT | Performed by: OBSTETRICS & GYNECOLOGY

## 2023-05-19 PROCEDURE — 77063 BREAST TOMOSYNTHESIS BI: CPT | Performed by: OBSTETRICS & GYNECOLOGY

## 2023-06-16 ENCOUNTER — OFFICE VISIT (OUTPATIENT)
Dept: FAMILY MEDICINE CLINIC | Facility: CLINIC | Age: 68
End: 2023-06-16
Payer: MEDICARE

## 2023-06-16 VITALS
DIASTOLIC BLOOD PRESSURE: 94 MMHG | HEIGHT: 66 IN | SYSTOLIC BLOOD PRESSURE: 134 MMHG | TEMPERATURE: 97 F | WEIGHT: 257 LBS | BODY MASS INDEX: 41.3 KG/M2 | OXYGEN SATURATION: 98 % | RESPIRATION RATE: 16 BRPM | HEART RATE: 94 BPM

## 2023-06-16 DIAGNOSIS — Q03.9 CONGENITAL HYDROCEPHALUS, UNSPECIFIED (HCC): ICD-10-CM

## 2023-06-16 DIAGNOSIS — G47.33 OSA (OBSTRUCTIVE SLEEP APNEA): ICD-10-CM

## 2023-06-16 DIAGNOSIS — N18.31 STAGE 3A CHRONIC KIDNEY DISEASE (HCC): Chronic | ICD-10-CM

## 2023-06-16 DIAGNOSIS — E78.2 MIXED HYPERLIPIDEMIA: ICD-10-CM

## 2023-06-16 DIAGNOSIS — I10 ESSENTIAL HYPERTENSION, BENIGN: ICD-10-CM

## 2023-06-16 DIAGNOSIS — K21.9 GASTROESOPHAGEAL REFLUX DISEASE, UNSPECIFIED WHETHER ESOPHAGITIS PRESENT: ICD-10-CM

## 2023-06-16 DIAGNOSIS — M54.16 BILATERAL LUMBAR RADICULOPATHY: ICD-10-CM

## 2023-06-16 DIAGNOSIS — R73.03 PREDIABETES: ICD-10-CM

## 2023-06-16 DIAGNOSIS — F32.1 CURRENT MODERATE EPISODE OF MAJOR DEPRESSIVE DISORDER WITHOUT PRIOR EPISODE (HCC): ICD-10-CM

## 2023-06-16 DIAGNOSIS — Z00.00 ENCOUNTER FOR ANNUAL HEALTH EXAMINATION: Primary | ICD-10-CM

## 2023-06-16 DIAGNOSIS — Z51.81 ENCOUNTER FOR THERAPEUTIC DRUG MONITORING: ICD-10-CM

## 2023-06-16 DIAGNOSIS — E66.01 CLASS 3 SEVERE OBESITY WITHOUT SERIOUS COMORBIDITY WITH BODY MASS INDEX (BMI) OF 40.0 TO 44.9 IN ADULT, UNSPECIFIED OBESITY TYPE (HCC): ICD-10-CM

## 2023-06-16 DIAGNOSIS — E66.01 OBESITY, CLASS III, BMI 40-49.9 (MORBID OBESITY) (HCC): ICD-10-CM

## 2023-06-16 DIAGNOSIS — M48.062 SPINAL STENOSIS OF LUMBAR REGION WITH NEUROGENIC CLAUDICATION: ICD-10-CM

## 2023-06-16 DIAGNOSIS — I10 ESSENTIAL HYPERTENSION: ICD-10-CM

## 2023-06-16 DIAGNOSIS — M79.644 PAIN IN FINGER OF RIGHT HAND: ICD-10-CM

## 2023-06-16 RX ORDER — HYDROCHLOROTHIAZIDE 25 MG/1
25 TABLET ORAL DAILY
Qty: 90 TABLET | Refills: 0 | Status: SHIPPED | OUTPATIENT
Start: 2023-06-16

## 2023-06-16 RX ORDER — METFORMIN HYDROCHLORIDE 750 MG/1
TABLET, EXTENDED RELEASE ORAL
Qty: 90 TABLET | Refills: 1 | Status: SHIPPED | OUTPATIENT
Start: 2023-06-16

## 2023-06-16 RX ORDER — ROSUVASTATIN CALCIUM 5 MG/1
5 TABLET, COATED ORAL NIGHTLY
Qty: 90 TABLET | Refills: 1 | Status: SHIPPED | OUTPATIENT
Start: 2023-06-16

## 2023-06-26 ENCOUNTER — LAB ENCOUNTER (OUTPATIENT)
Dept: LAB | Age: 68
End: 2023-06-26
Attending: FAMILY MEDICINE
Payer: MEDICARE

## 2023-06-26 DIAGNOSIS — E78.2 MIXED HYPERLIPIDEMIA: ICD-10-CM

## 2023-06-26 DIAGNOSIS — I10 ESSENTIAL HYPERTENSION, BENIGN: ICD-10-CM

## 2023-06-26 LAB
ANION GAP SERPL CALC-SCNC: 5 MMOL/L (ref 0–18)
BUN BLD-MCNC: 28 MG/DL (ref 7–18)
CALCIUM BLD-MCNC: 10.7 MG/DL (ref 8.5–10.1)
CHLORIDE SERPL-SCNC: 109 MMOL/L (ref 98–112)
CHOLEST SERPL-MCNC: 208 MG/DL (ref ?–200)
CO2 SERPL-SCNC: 26 MMOL/L (ref 21–32)
CREAT BLD-MCNC: 1.13 MG/DL
FASTING PATIENT LIPID ANSWER: YES
FASTING STATUS PATIENT QL REPORTED: YES
GFR SERPLBLD BASED ON 1.73 SQ M-ARVRAT: 53 ML/MIN/1.73M2 (ref 60–?)
GLUCOSE BLD-MCNC: 114 MG/DL (ref 70–99)
HDLC SERPL-MCNC: 69 MG/DL (ref 40–59)
LDLC SERPL CALC-MCNC: 117 MG/DL (ref ?–100)
NONHDLC SERPL-MCNC: 139 MG/DL (ref ?–130)
OSMOLALITY SERPL CALC.SUM OF ELEC: 296 MOSM/KG (ref 275–295)
POTASSIUM SERPL-SCNC: 4 MMOL/L (ref 3.5–5.1)
SODIUM SERPL-SCNC: 140 MMOL/L (ref 136–145)
TRIGL SERPL-MCNC: 125 MG/DL (ref 30–149)
VLDLC SERPL CALC-MCNC: 22 MG/DL (ref 0–30)

## 2023-06-26 PROCEDURE — 80061 LIPID PANEL: CPT

## 2023-06-26 PROCEDURE — 80048 BASIC METABOLIC PNL TOTAL CA: CPT | Performed by: FAMILY MEDICINE

## 2023-06-30 ENCOUNTER — HOSPITAL ENCOUNTER (OUTPATIENT)
Dept: GENERAL RADIOLOGY | Age: 68
Discharge: HOME | End: 2023-06-30
Attending: FAMILY MEDICINE
Payer: MEDICARE

## 2023-06-30 DIAGNOSIS — M79.644 PAIN IN FINGER OF RIGHT HAND: ICD-10-CM

## 2023-06-30 PROCEDURE — 73130 X-RAY EXAM OF HAND: CPT | Performed by: FAMILY MEDICINE

## 2023-07-10 ENCOUNTER — TELEPHONE (OUTPATIENT)
Dept: FAMILY MEDICINE CLINIC | Facility: CLINIC | Age: 68
End: 2023-07-10

## 2023-07-10 DIAGNOSIS — M19.049 HAND ARTHRITIS: ICD-10-CM

## 2023-07-10 DIAGNOSIS — M79.644 PAIN IN FINGER OF RIGHT HAND: Primary | ICD-10-CM

## 2023-07-10 NOTE — TELEPHONE ENCOUNTER
\"As we were suspecting, there is arthritis throughout the fingers of the right hand as well as calcifications in the cartilage of the wrist.  Next steps would be seeing our hand specialist to discuss further work-up if needed. Let me know if he would like referral.  Otherwise can continue icing and resting as needed. Please let me know if you have any questions. [id number],[place] [address], DO 7/7/2023 3:10 PM\"    Left message on machine for patient to call back.

## 2023-07-10 NOTE — TELEPHONE ENCOUNTER
Patient notified. Patient verbalized understanding of information given. She is agreeable to referral. Please enter.

## 2023-07-27 DIAGNOSIS — I10 ESSENTIAL HYPERTENSION, BENIGN: ICD-10-CM

## 2023-07-27 NOTE — TELEPHONE ENCOUNTER
Requested Prescriptions     Pending Prescriptions Disp Refills    LISINOPRIL 20 MG Oral Tab [Pharmacy Med Name: Lisinopril Oral Tablet 20 MG] 30 tablet 0     Sig: TAKE ONE TABLET BY MOUTH ONE TIME DAILY     LOV 6/16/2023     Patient was asked to follow-up in: 3 months    Appointment scheduled: 9/18/2023 Sasha Cardenas DO     Medication refilled per protocol. Routed to Sasha Cardenas DO, for review.

## 2023-07-31 RX ORDER — LISINOPRIL 20 MG/1
TABLET ORAL
Qty: 90 TABLET | Refills: 0 | Status: SHIPPED | OUTPATIENT
Start: 2023-07-31

## 2023-08-14 ENCOUNTER — TELEPHONE (OUTPATIENT)
Dept: FAMILY MEDICINE CLINIC | Facility: CLINIC | Age: 68
End: 2023-08-14

## 2023-08-14 DIAGNOSIS — R73.03 BORDERLINE DIABETES: Primary | ICD-10-CM

## 2023-08-31 RX ORDER — PREGABALIN 25 MG/1
25 CAPSULE ORAL 2 TIMES DAILY
Qty: 180 CAPSULE | Refills: 0 | Status: SHIPPED | OUTPATIENT
Start: 2023-08-31

## 2023-09-10 DIAGNOSIS — I10 ESSENTIAL HYPERTENSION, BENIGN: ICD-10-CM

## 2023-09-11 RX ORDER — HYDROCHLOROTHIAZIDE 25 MG/1
25 TABLET ORAL DAILY
Qty: 90 TABLET | Refills: 0 | Status: SHIPPED | OUTPATIENT
Start: 2023-09-11

## 2023-09-11 NOTE — TELEPHONE ENCOUNTER
Requested Prescriptions     Pending Prescriptions Disp Refills    HYDROCHLOROTHIAZIDE 25 MG Oral Tab [Pharmacy Med Name: hydroCHLOROthiazide Oral Tablet 25 MG] 90 tablet 0     Sig: TAKE 1 TABLET BY MOUTH DAILY     LOV 6/16/2023     Patient was asked to follow-up in: 3 months    Appointment scheduled: 9/18/2023 Kelley Leon, DO     Medication refilled per protocol.

## 2023-09-13 ENCOUNTER — TELEPHONE (OUTPATIENT)
Dept: FAMILY MEDICINE CLINIC | Facility: CLINIC | Age: 68
End: 2023-09-13

## 2023-09-13 DIAGNOSIS — R79.89 TSH EXCESS: Primary | ICD-10-CM

## 2023-09-18 ENCOUNTER — OFFICE VISIT (OUTPATIENT)
Dept: FAMILY MEDICINE CLINIC | Facility: CLINIC | Age: 68
End: 2023-09-18
Payer: MEDICARE

## 2023-09-18 VITALS
HEIGHT: 66 IN | SYSTOLIC BLOOD PRESSURE: 154 MMHG | BODY MASS INDEX: 42.59 KG/M2 | TEMPERATURE: 97 F | DIASTOLIC BLOOD PRESSURE: 94 MMHG | OXYGEN SATURATION: 98 % | HEART RATE: 94 BPM | RESPIRATION RATE: 16 BRPM | WEIGHT: 265 LBS

## 2023-09-18 DIAGNOSIS — E21.3 HYPERPARATHYROIDISM (HCC): ICD-10-CM

## 2023-09-18 DIAGNOSIS — R73.03 PREDIABETES: ICD-10-CM

## 2023-09-18 DIAGNOSIS — I10 ESSENTIAL HYPERTENSION, BENIGN: Primary | ICD-10-CM

## 2023-09-18 DIAGNOSIS — N18.31 STAGE 3A CHRONIC KIDNEY DISEASE (HCC): Chronic | ICD-10-CM

## 2023-09-18 PROCEDURE — 99214 OFFICE O/P EST MOD 30 MIN: CPT | Performed by: FAMILY MEDICINE

## 2023-09-18 PROCEDURE — 3080F DIAST BP >= 90 MM HG: CPT | Performed by: FAMILY MEDICINE

## 2023-09-18 PROCEDURE — 3008F BODY MASS INDEX DOCD: CPT | Performed by: FAMILY MEDICINE

## 2023-09-18 PROCEDURE — 1159F MED LIST DOCD IN RCRD: CPT | Performed by: FAMILY MEDICINE

## 2023-09-18 PROCEDURE — 1160F RVW MEDS BY RX/DR IN RCRD: CPT | Performed by: FAMILY MEDICINE

## 2023-09-18 PROCEDURE — 3077F SYST BP >= 140 MM HG: CPT | Performed by: FAMILY MEDICINE

## 2023-09-18 RX ORDER — LISINOPRIL 30 MG/1
30 TABLET ORAL DAILY
Qty: 90 TABLET | Refills: 0 | Status: SHIPPED | OUTPATIENT
Start: 2023-09-18

## 2023-11-07 ENCOUNTER — OFFICE VISIT (OUTPATIENT)
Dept: FAMILY MEDICINE CLINIC | Facility: CLINIC | Age: 68
End: 2023-11-07
Payer: MEDICARE

## 2023-11-07 VITALS
RESPIRATION RATE: 16 BRPM | DIASTOLIC BLOOD PRESSURE: 90 MMHG | OXYGEN SATURATION: 93 % | WEIGHT: 264 LBS | HEIGHT: 66 IN | HEART RATE: 90 BPM | SYSTOLIC BLOOD PRESSURE: 122 MMHG | BODY MASS INDEX: 42.43 KG/M2 | TEMPERATURE: 97 F

## 2023-11-07 DIAGNOSIS — I10 ESSENTIAL HYPERTENSION, BENIGN: ICD-10-CM

## 2023-11-07 PROCEDURE — 3008F BODY MASS INDEX DOCD: CPT | Performed by: FAMILY MEDICINE

## 2023-11-07 PROCEDURE — 99213 OFFICE O/P EST LOW 20 MIN: CPT | Performed by: FAMILY MEDICINE

## 2023-11-07 PROCEDURE — 3080F DIAST BP >= 90 MM HG: CPT | Performed by: FAMILY MEDICINE

## 2023-11-07 PROCEDURE — 1160F RVW MEDS BY RX/DR IN RCRD: CPT | Performed by: FAMILY MEDICINE

## 2023-11-07 PROCEDURE — 90662 IIV NO PRSV INCREASED AG IM: CPT | Performed by: FAMILY MEDICINE

## 2023-11-07 PROCEDURE — 1159F MED LIST DOCD IN RCRD: CPT | Performed by: FAMILY MEDICINE

## 2023-11-07 PROCEDURE — 3074F SYST BP LT 130 MM HG: CPT | Performed by: FAMILY MEDICINE

## 2023-11-07 PROCEDURE — G0008 ADMIN INFLUENZA VIRUS VAC: HCPCS | Performed by: FAMILY MEDICINE

## 2023-11-07 RX ORDER — LISINOPRIL 40 MG/1
40 TABLET ORAL DAILY
Qty: 90 TABLET | Refills: 0 | Status: SHIPPED | OUTPATIENT
Start: 2023-11-07

## 2023-12-18 DIAGNOSIS — E78.2 MIXED HYPERLIPIDEMIA: ICD-10-CM

## 2023-12-19 RX ORDER — ROSUVASTATIN CALCIUM 5 MG/1
5 TABLET, COATED ORAL NIGHTLY
Qty: 90 TABLET | Refills: 0 | Status: SHIPPED | OUTPATIENT
Start: 2023-12-19 | End: 2024-03-14

## 2023-12-19 NOTE — TELEPHONE ENCOUNTER
Requested Prescriptions     Signed Prescriptions Disp Refills    ROSUVASTATIN 5 MG Oral Tab 90 tablet 0     Sig: TAKE 1 TABLET BY MOUTH NIGHTLY     Authorizing Provider: NIRAV LUIS     Ordering User: SHERICE BEARDEN 11/7/2023     Patient was asked to follow-up in: Follow-up not documented in note    Appointment scheduled: Visit date not found     Medication refilled per protocol.

## 2024-02-21 ENCOUNTER — TELEPHONE (OUTPATIENT)
Dept: FAMILY MEDICINE CLINIC | Facility: CLINIC | Age: 69
End: 2024-02-21

## 2024-02-21 DIAGNOSIS — H25.13 AGE-RELATED NUCLEAR CATARACT OF BOTH EYES: Primary | ICD-10-CM

## 2024-03-14 DIAGNOSIS — E78.2 MIXED HYPERLIPIDEMIA: ICD-10-CM

## 2024-03-14 RX ORDER — ROSUVASTATIN CALCIUM 5 MG/1
5 TABLET, COATED ORAL NIGHTLY
Qty: 90 TABLET | Refills: 0 | Status: SHIPPED | OUTPATIENT
Start: 2024-03-14

## 2024-03-14 NOTE — TELEPHONE ENCOUNTER
Requested Prescriptions     Pending Prescriptions Disp Refills    ROSUVASTATIN 5 MG Oral Tab [Pharmacy Med Name: Rosuvastatin Calcium Oral Tablet 5 MG] 90 tablet 0     Sig: TAKE ONE TABLET BY MOUTH NIGHTLY     LOV 11/7/2023     Patient was asked to follow-up in: Follow-up not documented in note    Appointment scheduled: 5/6/2024 Kay Villalpando, DO     Medication refilled per protocol.

## 2024-03-29 ENCOUNTER — TELEPHONE (OUTPATIENT)
Dept: FAMILY MEDICINE CLINIC | Facility: CLINIC | Age: 69
End: 2024-03-29

## 2024-04-29 ENCOUNTER — TELEPHONE (OUTPATIENT)
Dept: FAMILY MEDICINE CLINIC | Facility: CLINIC | Age: 69
End: 2024-04-29

## 2024-04-29 NOTE — TELEPHONE ENCOUNTER
Pt is calling because she needs a letter to place her gym membership on hold due to her surgeries

## 2024-04-30 ENCOUNTER — TELEPHONE (OUTPATIENT)
Dept: FAMILY MEDICINE CLINIC | Facility: CLINIC | Age: 69
End: 2024-04-30

## 2024-04-30 NOTE — TELEPHONE ENCOUNTER
Pt is having LT eye cataract removal on 5/22 with Dr Cooper     Pt needs a H&P    Placed in triage in front of printer

## 2024-05-01 NOTE — TELEPHONE ENCOUNTER
Called and talked to patient and she would like this stopped for 3 months having parathyroid surgery.

## 2024-05-01 NOTE — TELEPHONE ENCOUNTER
Called CANELO to call back with how long she want to suspend the membership and where and how she wants this to go

## 2024-05-02 ENCOUNTER — TELEPHONE (OUTPATIENT)
Dept: FAMILY MEDICINE CLINIC | Facility: CLINIC | Age: 69
End: 2024-05-02

## 2024-05-02 RX ORDER — CYCLOSPORINE 0.5 MG/ML
1 EMULSION OPHTHALMIC EVERY 12 HOURS
COMMUNITY
Start: 2024-01-11

## 2024-05-03 NOTE — TELEPHONE ENCOUNTER
Called LMWILIAN in detail that she should discuss this at her pre op visit with Dr Villalpando.   
Patient call requesting speak to a nurse because want to know what medication is going to stop before surgery.  Please tell the nurse to call me back.  
She is scheduled for a preop surgery with mary next week. They can discuss then  
[de-identified] : This is very nice 68-year-old female experiencing 4 months of right knee pain, which is severe in intensity.  This further worsened after she twisted her knee 2 weeks ago which further flared the pain.  The pain substantially limits activities of daily living. Walking tolerance is reduced.  Mobic has not helped.  Not using a knee brace.  Not using a cane or walker.  The patient denies any radiation of the pain to the feet and it is not associated with numbness, tingling, or weakness.

## 2024-05-09 ENCOUNTER — APPOINTMENT (OUTPATIENT)
Dept: LAB | Age: 69
End: 2024-05-09
Attending: FAMILY MEDICINE
Payer: MEDICARE

## 2024-05-09 ENCOUNTER — EKG ENCOUNTER (OUTPATIENT)
Dept: LAB | Facility: HOSPITAL | Age: 69
End: 2024-05-09
Attending: FAMILY MEDICINE
Payer: MEDICARE

## 2024-05-09 ENCOUNTER — OFFICE VISIT (OUTPATIENT)
Dept: FAMILY MEDICINE CLINIC | Facility: CLINIC | Age: 69
End: 2024-05-09

## 2024-05-09 VITALS
WEIGHT: 275 LBS | HEIGHT: 66 IN | OXYGEN SATURATION: 98 % | HEART RATE: 79 BPM | BODY MASS INDEX: 44.2 KG/M2 | TEMPERATURE: 98 F | SYSTOLIC BLOOD PRESSURE: 140 MMHG | DIASTOLIC BLOOD PRESSURE: 80 MMHG

## 2024-05-09 DIAGNOSIS — E21.3 HYPERPARATHYROIDISM (HCC): ICD-10-CM

## 2024-05-09 DIAGNOSIS — Z01.818 PREOP EXAMINATION: ICD-10-CM

## 2024-05-09 DIAGNOSIS — H25.13 AGE-RELATED NUCLEAR CATARACT OF BOTH EYES: ICD-10-CM

## 2024-05-09 DIAGNOSIS — Z01.818 PREOP EXAMINATION: Primary | ICD-10-CM

## 2024-05-09 PROCEDURE — 3077F SYST BP >= 140 MM HG: CPT | Performed by: FAMILY MEDICINE

## 2024-05-09 PROCEDURE — 99214 OFFICE O/P EST MOD 30 MIN: CPT | Performed by: FAMILY MEDICINE

## 2024-05-09 PROCEDURE — 1160F RVW MEDS BY RX/DR IN RCRD: CPT | Performed by: FAMILY MEDICINE

## 2024-05-09 PROCEDURE — 3079F DIAST BP 80-89 MM HG: CPT | Performed by: FAMILY MEDICINE

## 2024-05-09 PROCEDURE — 3008F BODY MASS INDEX DOCD: CPT | Performed by: FAMILY MEDICINE

## 2024-05-09 PROCEDURE — 93005 ELECTROCARDIOGRAM TRACING: CPT

## 2024-05-09 PROCEDURE — 93010 ELECTROCARDIOGRAM REPORT: CPT | Performed by: INTERNAL MEDICINE

## 2024-05-09 PROCEDURE — 1159F MED LIST DOCD IN RCRD: CPT | Performed by: FAMILY MEDICINE

## 2024-05-09 NOTE — PROGRESS NOTES
Payor: ALEJANDRA [facility] / Plan: ALEJANDRA [employer]MO / Product Type: Medicare /     Subjective:  HPI:  68 year old female who has a past medical history of Arthritis, Back pain, Back problem, Colon polyp (01/04/2020), Depression, Disorder of thyroid, Esophageal reflux, H/O total knee replacement (06/19/2013), Heartburn, Hemorrhoids, HIGH BLOOD PRESSURE, High cholesterol, History of adenomatous polyp of colon (12/06/2021), Impacted cerumen, Indigestion, Obesity, NAYLA (obstructive sleep apnea) (PSG 10-01-15), Osteoarthrosis, unspecified whether generalized or localized, unspecified site, Other and unspecified hyperlipidemia, PONV (postoperative nausea and vomiting), Prediabetes, Renal disorder, Sleep apnea, Unspecified essential hypertension, Visual impairment, and Wears glasses. presenting to clinic today for pre-op assessment for surgery scheduled 5/17/24 and 5/22/24.  Surgery: Parathyroidectomy, left cataract removal  Reason for surgery: Hyperparathyroidism, cataract  Surgeon: Dr. Jules, Dr. Cooper  History of anesthesia: yes. Complications: vomiting.  Surgical procedure risk: intermediate    Current medical conditions:  Hyperparathyroidism, Thyroid nodule: Noted elevated calcium since 2020.         Acquired spondylolisthesis      Degeneration of lumbar or lumbosacral intervertebral disc     Lumbago                Back pain has been controlled. On ibuprofen which seems to control pain. Stiffness always present. Pain worse in the morning. Takes flexeril prn. On lyrica.     Hyperlipidemia                 On crestor 5mg and omega 3 fatty acids     Essential hypertension, benign                 On lisinopril, hydrochlorothiazide. Denies Headaches, SOB, vision changes, chest pain and LE swelling.     GERD (gastroesophageal reflux disease)                 Previously on omeprazole. Now manages with TUMs          Obesity, Class III, BMI 40-49.9 (morbid obesity) (HCC)                 Previously followed in weight loss clinic. Not  much improvement. Now trying to get back into exercise and eating healthy.      Prediabetes                 Last A1c 5.9%. On metformin.     Obstructive sleep apnea (adult)                 On CPAP.     CKD stage III                 Last Creatinine at baseline 1.13     MDD, anxiety: On wellbutrin. Stable on current medications.      MCI, congenital hydrocephalus: Follows with neurology (Faizan). Improved after CPAP use consistently     Active cardiac conditions  Patient has: none    Screening Questions:  1. Do you usually get chest pain or breathlessness when you climb up two flights of stairs at normal speed? no  2. Do you have kidney disease? Yes- as above  3. Has anyone in your family (blood relatives) had a problem following an anaesthetic? no  4. Have you ever had a heart attack? no  5. Have you ever been diagnosed with an irregular heartbeat? no  6. Have you ever had a stroke? no  7. If you have been put to sleep for an operation were there any anaesthetic problems? no  8. Do you suffer from epilepsy or seizures? no  9. Do you have any problems with pain, stiffness or arthritis in your neck or jaw? no  10. Do you have thyroid disease? no  11. Do you suffer from angina? no  12. Do you have liver disease? no  13. Have you ever been diagnosed with heart failure? no  14. Do you suffer from asthma? no  15. Do you have diabetes that requires insulin? no  16. Do you have diabetes that requires tablets only? no  17. Do you suffer from bronchitis? no    Functional status:   Can take care of self, such as eat, dress, or use the toilet (1 MET)    ROS: Denies fever/chills, sleep problems, HA, vision changes, dizziness, lightheadedness, rhinorrhea, SOB, cough, difficulty swallowing, CP, edema, N/V, abd pain, diarrhea, constipation, hematochezia, dysuria, frequency, urgency, hematuria, weakness, syncope, rash, pain, easy bruising, and mood disturbance. ROS otherwise as stated above in HPI  Past medical, surgical, family,  and social histories, as well as medications and allergies, reviewed as below.  HISTORY:  Past Medical History:    Arthritis    Back pain    Back problem    lumbar    Colon polyp    Depression    Disorder of thyroid    PARATHYROID    Esophageal reflux    H/O total knee replacement    Heartburn    Hemorrhoids    HIGH BLOOD PRESSURE    High cholesterol    History of adenomatous polyp of colon    Impacted cerumen    Indigestion    Obesity    NAYLA (obstructive sleep apnea)    AHI 20 [address] 80%    Osteoarthrosis, unspecified whether generalized or localized, unspecified site    Other and unspecified hyperlipidemia    PONV (postoperative nausea and vomiting)    Prediabetes    Renal disorder    CKD    Sleep apnea    CPAP    Unspecified essential hypertension    Visual impairment    glasses    Wears glasses      Past Surgical History:   Procedure Laterality Date    Back surgery      Excis lumbar disk,one level      Knee replacement surgery  8/2007    right    Knee replacement surgery  8/2013    left    Other  7/11/2016    LUMBAR LAMINECTOMY 1 LEVEL    Spinal fusion  2017    L4-L5 posterolateral spinal fusion with L4-L5 decompressive lumbar laminectomy with nerve root decompression      Family History   Problem Relation Age of Onset    Cancer Sister         Breast Cancer    Breast Cancer Sister 50        Twin sister.    Diabetes Father     Heart Disease Father     Other (Other) Father     Heart Disease Mother     Other (NAYLA) Brother       Social History     Socioeconomic History    Marital status:    Occupational History    Occupation: Retired   Tobacco Use    Smoking status: Never    Smokeless tobacco: Never   Vaping Use    Vaping status: Never Used   Substance and Sexual Activity    Alcohol use: No     Alcohol/week: 0.0 standard drinks of alcohol     Comment: 4 drinks per year/rare    Drug use: No    Sexual activity: Not Currently   Other Topics Concern    Caffeine Concern Yes     Comment: tea or 1 mountain dew  daily    Exercise Yes     Comment: 1-2x a week    Seat Belt Yes    Self-Exams No          Current Outpatient Medications on File Prior to Visit   Medication Sig Dispense Refill    RESTASIS 0.05 % Ophthalmic Emulsion Place 1 drop into both eyes every 12 (twelve) hours.      ROSUVASTATIN 5 MG Oral Tab TAKE ONE TABLET BY MOUTH NIGHTLY 90 tablet 0    lisinopril 40 MG Oral Tab Take 1 tablet (40 mg total) by mouth daily. 90 tablet 0    HYDROCHLOROTHIAZIDE 25 MG Oral Tab TAKE 1 TABLET BY MOUTH DAILY 90 tablet 0    pregabalin 25 MG Oral Cap Take 1 capsule (25 mg total) by mouth 2 (two) times daily. 180 capsule 0    metFORMIN  MG Oral Tablet 24 Hr TAKE ONE TABLET BY MOUTH DAILY WITH BREAKFAST 90 tablet 1    DICLOFENAC 50 MG Oral Tab EC TAKE ONE TABLET BY MOUTH TWICE DAILY as needed 60 tablet 0    BUPROPION  MG Oral Tablet 24 Hr TAKE ONE TABLET BY MOUTH ONE TIME DAILY 90 tablet 0    latanoprost 0.005 % Ophthalmic Solution       Magnesium-Calcium-Folic Acid 400-200-1 MG Oral Tab Take 400 mg by mouth daily.      Multiple Vitamins-Minerals (MULTIPLE VITAMINS/WOMENS OR) Take 1 tablet by mouth daily.        Ascorbic Acid (VITAMIN C) 250 MG Oral Tab Take 1 tablet (250 mg total) by mouth daily.       No current facility-administered medications on file prior to visit.       Allergies   Allergen Reactions    Adhesive Tape OTHER (SEE COMMENTS)     Ok with paper tape    Menthol RASH    Seasonal Runny nose       OBJECTIVE:    Vitals:    05/09/24 1220   BP: 140/80   BP Location: Right arm   Patient Position: Sitting   Cuff Size: large   Pulse: 79   Temp: 97.9 °F (36.6 °C)   TempSrc: Oral   SpO2: 98%   Weight: 275 lb (124.7 kg)   Height: 5' 6\" (1.676 m)     Body mass index is 44.39 kg/m².  Physical Exam:  General: Well-appearing, cooperative, good hygiene.  HEENT: Normocephalic, atraumatic, PERRL, conjunctivae clear, EOMI. Oropharynx w/o erythema, edema, or exudate. MMM. Neck supple, no LAD.  Otoscopic: canals clear, TMs  intact, normal landmarks, no fluid.  Resp: Comfortable WOB. CTAB. No wheezes or crackles.  CV: RRR. Normal S1/S2, no murmurs. No JVD.  Abd: NABS, soft, nontender, nondistended. No palpable masses, no hepatosplenomegaly.  Extrem: No clubbing, cyanosis, edema. 2+ pulses b/l UE and LE.  MSK: Moves all extremities actively and equally. No joint enlargement or tenderness of UE or LE.  Neuro: CNII-XII grossly intact. Sensation intact to light touch. Gait wnl.  Skin: No rashes, lesions, bruising visible.  MS: No depression, anxiety, agitation.  Sodium  136 - 145 mmol/L 146 High    Potassium  3.5 - 5.2 mmol/L 4.5   Chloride  98 - 107 mmol/L 108 High    Carbon Dioxide  22.0 - 29.0 mmol/L 27.0   Blood Urea Nitrogen  6.0 - 20.0 mg/dL 28.0 High    Creatinine  0.5 - 0.9 mg/dL 0.98 High    BUN/CREAT Ratio  10.0 - 20.0 29.0 High    Glucose  74 - 109 mg/dL 119 High    Calcium  8.6 - 10.3 mg/dL 10.5 High    GFR CKD-EPI  >=60.00 mL/min/1.73 m² 59.46 Low      Normal sinus rhythm   Normal ECG   When compared with ECG of 06-JUL-2016 15:16,   No significant change was found     Assessment and Plan:  Karly was seen today for pre-op exam.    Diagnoses and all orders for this visit:    Preop examination  -     EKG 12 Lead performed at [facility]; Future    Hyperparathyroidism (HCC)  Age-related nuclear cataract of both eyes    Cardiac:  Patient's Revised Cardiac Risk Index is Class I which means the risk of perioperative cardiac event is 3.9%, which is comparable to the inherent risk of surgery of 1-5%.    Given the patient's good functional status this is likely an acceptable risk. If the surgical team believes the anticipated benefit of the procedure outweighs the risks, then surgery may proceed with our following medical recommendations.   Pulm: No pulmonary problems and incentive spirometry is encouraged for patient's low risk of complication given age >60.    Thromboembolic: Surgery itself is thrombogenic, and even low-risk  patients are still at increased risk for thromboembolic events when they undergo intermediate- or high-risk surgeries.    Delirium: Age of 68 gives a risk of 11% of developing hospital delirium.    day.    HTN: Well controlled BP, would continue current therapy throughout entire surgical process.    All pertinent previous records reviewed prior to and during visit.  Patient is an acceptable surgical candidate.      Kay Villalpando DO      Your appointments       Date & Time Appointment Department ([place])    Jun 14, 2024 12:20 PM CDT MA Supervisit with Kay Villalpando DO [facility] ([facility])              [facility]  [address]  [facility] [address] [phone]

## 2024-05-09 NOTE — H&P (VIEW-ONLY)
Payor: ALEJANDRA [facility] / Plan: ALEJANDRA [facility] HMO / Product Type: Medicare /     Subjective:  HPI:  68 year old female who has a past medical history of Arthritis, Back pain, Back problem, Colon polyp (01/04/2020), Depression, Disorder of thyroid, Esophageal reflux, H/O total knee replacement (06/19/2013), Heartburn, Hemorrhoids, HIGH BLOOD PRESSURE, High cholesterol, History of adenomatous polyp of colon (12/06/2021), Impacted cerumen, Indigestion, Obesity, NAYLA (obstructive sleep apnea) (PSG 10-01-15), Osteoarthrosis, unspecified whether generalized or localized, unspecified site, Other and unspecified hyperlipidemia, PONV (postoperative nausea and vomiting), Prediabetes, Renal disorder, Sleep apnea, Unspecified essential hypertension, Visual impairment, and Wears glasses. presenting to clinic today for pre-op assessment for surgery scheduled 5/17/24 and 5/22/24.  Surgery: Parathyroidectomy, left cataract removal  Reason for surgery: Hyperparathyroidism, cataract  Surgeon: Dr. Jules, Dr. Cooper  History of anesthesia: yes. Complications: vomiting.  Surgical procedure risk: intermediate    Current medical conditions:  Hyperparathyroidism, Thyroid nodule: Noted elevated calcium since 2020.         Acquired spondylolisthesis      Degeneration of lumbar or lumbosacral intervertebral disc     Lumbago                Back pain has been controlled. On ibuprofen which seems to control pain. Stiffness always present. Pain worse in the morning. Takes flexeril prn. On lyrica.     Hyperlipidemia                 On crestor 5mg and omega 3 fatty acids     Essential hypertension, benign                 On lisinopril, hydrochlorothiazide. Denies Headaches, SOB, vision changes, chest pain and LE swelling.     GERD (gastroesophageal reflux disease)                 Previously on omeprazole. Now manages with TUMs          Obesity, Class III, BMI 40-49.9 (morbid obesity) (HCC)                 Previously followed in weight loss clinic. Not  much improvement. Now trying to get back into exercise and eating healthy.      Prediabetes                 Last A1c 5.9%. On metformin.     Obstructive sleep apnea (adult)                 On CPAP.     CKD stage III                 Last Creatinine at baseline 1.13     MDD, anxiety: On wellbutrin. Stable on current medications.      MCI, congenital hydrocephalus: Follows with neurology (Faizan). Improved after CPAP use consistently     Active cardiac conditions  Patient has: none    Screening Questions:  1. Do you usually get chest pain or breathlessness when you climb up two flights of stairs at normal speed? no  2. Do you have kidney disease? Yes- as above  3. Has anyone in your family (blood relatives) had a problem following an anaesthetic? no  4. Have you ever had a heart attack? no  5. Have you ever been diagnosed with an irregular heartbeat? no  6. Have you ever had a stroke? no  7. If you have been put to sleep for an operation were there any anaesthetic problems? no  8. Do you suffer from epilepsy or seizures? no  9. Do you have any problems with pain, stiffness or arthritis in your neck or jaw? no  10. Do you have thyroid disease? no  11. Do you suffer from angina? no  12. Do you have liver disease? no  13. Have you ever been diagnosed with heart failure? no  14. Do you suffer from asthma? no  15. Do you have diabetes that requires insulin? no  16. Do you have diabetes that requires tablets only? no  17. Do you suffer from bronchitis? no    Functional status:   Can take care of self, such as eat, dress, or use the toilet (1 MET)    ROS: Denies fever/chills, sleep problems, HA, vision changes, dizziness, lightheadedness, rhinorrhea, SOB, cough, difficulty swallowing, CP, edema, N/V, abd pain, diarrhea, constipation, hematochezia, dysuria, frequency, urgency, hematuria, weakness, syncope, rash, pain, easy bruising, and mood disturbance. ROS otherwise as stated above in HPI  Past medical, surgical, family,  and social histories, as well as medications and allergies, reviewed as below.  HISTORY:  Past Medical History:    Arthritis    Back pain    Back problem    lumbar    Colon polyp    Depression    Disorder of thyroid    PARATHYROID    Esophageal reflux    H/O total knee replacement    Heartburn    Hemorrhoids    HIGH BLOOD PRESSURE    High cholesterol    History of adenomatous polyp of colon    Impacted cerumen    Indigestion    Obesity    NAYLA (obstructive sleep apnea)    AHI 20 [address] [place] 80%    Osteoarthrosis, unspecified whether generalized or localized, unspecified site    Other and unspecified hyperlipidemia    PONV (postoperative nausea and vomiting)    Prediabetes    Renal disorder    CKD    Sleep apnea    CPAP    Unspecified essential hypertension    Visual impairment    glasses    Wears glasses      Past Surgical History:   Procedure Laterality Date    Back surgery      Excis lumbar disk,one level      Knee replacement surgery  8/2007    right    Knee replacement surgery  8/2013    left    Other  7/11/2016    LUMBAR LAMINECTOMY 1 LEVEL    Spinal fusion  2017    L4-L5 posterolateral spinal fusion with L4-L5 decompressive lumbar laminectomy with nerve root decompression      Family History   Problem Relation Age of Onset    Cancer Sister         Breast Cancer    Breast Cancer Sister 50        Twin sister.    Diabetes Father     Heart Disease Father     Other (Other) Father     Heart Disease Mother     Other (NAYLA) Brother       Social History     Socioeconomic History    Marital status:    Occupational History    Occupation: Retired   Tobacco Use    Smoking status: Never    Smokeless tobacco: Never   Vaping Use    Vaping status: Never Used   Substance and Sexual Activity    Alcohol use: No     Alcohol/week: 0.0 standard drinks of alcohol     Comment: 4 drinks per year/rare    Drug use: No    Sexual activity: Not Currently   Other Topics Concern    Caffeine Concern Yes     Comment: tea or 1 mountain dew  daily    Exercise Yes     Comment: 1-2x a week    Seat Belt Yes    Self-Exams No          Current Outpatient Medications on File Prior to Visit   Medication Sig Dispense Refill    RESTASIS 0.05 % Ophthalmic Emulsion Place 1 drop into both eyes every 12 (twelve) hours.      ROSUVASTATIN 5 MG Oral Tab TAKE ONE TABLET BY MOUTH NIGHTLY 90 tablet 0    lisinopril 40 MG Oral Tab Take 1 tablet (40 mg total) by mouth daily. 90 tablet 0    HYDROCHLOROTHIAZIDE 25 MG Oral Tab TAKE 1 TABLET BY MOUTH DAILY 90 tablet 0    pregabalin 25 MG Oral Cap Take 1 capsule (25 mg total) by mouth 2 (two) times daily. 180 capsule 0    metFORMIN  MG Oral Tablet 24 Hr TAKE ONE TABLET BY MOUTH DAILY WITH BREAKFAST 90 tablet 1    DICLOFENAC 50 MG Oral Tab EC TAKE ONE TABLET BY MOUTH TWICE DAILY as needed 60 tablet 0    BUPROPION  MG Oral Tablet 24 Hr TAKE ONE TABLET BY MOUTH ONE TIME DAILY 90 tablet 0    latanoprost 0.005 % Ophthalmic Solution       Magnesium-Calcium-Folic Acid 400-200-1 MG Oral Tab Take 400 mg by mouth daily.      Multiple Vitamins-Minerals (MULTIPLE VITAMINS/WOMENS OR) Take 1 tablet by mouth daily.        Ascorbic Acid (VITAMIN C) 250 MG Oral Tab Take 1 tablet (250 mg total) by mouth daily.       No current facility-administered medications on file prior to visit.       Allergies   Allergen Reactions    Adhesive Tape OTHER (SEE COMMENTS)     Ok with paper tape    Menthol RASH    Seasonal Runny nose       OBJECTIVE:    Vitals:    05/09/24 1220   BP: 140/80   BP Location: Right arm   Patient Position: Sitting   Cuff Size: large   Pulse: 79   Temp: 97.9 °F (36.6 °C)   TempSrc: Oral   SpO2: 98%   Weight: 275 lb (124.7 kg)   Height: 5' 6\" (1.676 m)     Body mass index is 44.39 kg/m².  Physical Exam:  General: Well-appearing, cooperative, good hygiene.  HEENT: Normocephalic, atraumatic, PERRL, conjunctivae clear, EOMI. Oropharynx w/o erythema, edema, or exudate. MMM. Neck supple, no LAD.  Otoscopic: canals clear, TMs  intact, normal landmarks, no fluid.  Resp: Comfortable WOB. CTAB. No wheezes or crackles.  CV: RRR. Normal S1/S2, no murmurs. No JVD.  Abd: NABS, soft, nontender, nondistended. No palpable masses, no hepatosplenomegaly.  Extrem: No clubbing, cyanosis, edema. 2+ pulses b/l UE and LE.  MSK: Moves all extremities actively and equally. No joint enlargement or tenderness of UE or LE.  Neuro: CNII-XII grossly intact. Sensation intact to light touch. Gait wnl.  Skin: No rashes, lesions, bruising visible.  MS: No depression, anxiety, agitation.  Sodium  136 - 145 mmol/L 146 High    Potassium  3.5 - 5.2 mmol/L 4.5   Chloride  98 - 107 mmol/L 108 High    Carbon Dioxide  22.0 - 29.0 mmol/L 27.0   Blood Urea Nitrogen  6.0 - 20.0 mg/dL 28.0 High    Creatinine  0.5 - 0.9 mg/dL 0.98 High    BUN/CREAT Ratio  10.0 - 20.0 29.0 High    Glucose  74 - 109 mg/dL 119 High    Calcium  8.6 - 10.3 mg/dL 10.5 High    GFR CKD-EPI  >=60.00 mL/min/1.73 m² 59.46 Low      Normal sinus rhythm   Normal ECG   When compared with ECG of 06-JUL-2016 15:16,   No significant change was found     Assessment and Plan:  Karly was seen today for pre-op exam.    Diagnoses and all orders for this visit:    Preop examination  -     EKG 12 Lead performed at [facility]; Future    Hyperparathyroidism (HCC)  Age-related nuclear cataract of both eyes    Cardiac:  Patient's Revised Cardiac Risk Index is Class I which means the risk of perioperative cardiac event is 3.9%, which is comparable to the inherent risk of surgery of 1-5%.    Given the patient's good functional status this is likely an acceptable risk. If the surgical team believes the anticipated benefit of the procedure outweighs the risks, then surgery may proceed with our following medical recommendations.   Pulm: No pulmonary problems and incentive spirometry is encouraged for patient's low risk of complication given age >60.    Thromboembolic: Surgery itself is thrombogenic, and even low-risk  patients are still at increased risk for thromboembolic events when they undergo intermediate- or high-risk surgeries.    Delirium: Age of 68 gives a risk of 11% of developing hospital delirium.    day.    HTN: Well controlled BP, would continue current therapy throughout entire surgical process.    All pertinent previous records reviewed prior to and during visit.  Patient is an acceptable surgical candidate.      Kay Villalpando DO      Your appointments       Date & Time Appointment Department ([place])    Jun 14, 2024 12:20 PM CDT MA Supervisit with Kay Villalpando DO [facility] ([facility])              [facility]  [address]  [facility] [address] [phone]

## 2024-05-10 LAB
ATRIAL RATE: 79 BPM
P AXIS: 46 DEGREES
P-R INTERVAL: 152 MS
Q-T INTERVAL: 374 MS
QRS DURATION: 74 MS
QTC CALCULATION (BEZET): 428 MS
R AXIS: 20 DEGREES
T AXIS: 40 DEGREES
VENTRICULAR RATE: 79 BPM

## 2024-05-17 ENCOUNTER — HOSPITAL ENCOUNTER (OUTPATIENT)
Facility: HOSPITAL | Age: 69
Discharge: HOME | End: 2024-05-18
Attending: OTOLARYNGOLOGY | Admitting: OTOLARYNGOLOGY

## 2024-05-17 ENCOUNTER — ANESTHESIA (OUTPATIENT)
Dept: SURGERY | Facility: HOSPITAL | Age: 69
End: 2024-05-17

## 2024-05-17 ENCOUNTER — ANESTHESIA EVENT (OUTPATIENT)
Dept: SURGERY | Facility: HOSPITAL | Age: 69
End: 2024-05-17

## 2024-05-17 LAB
CALCIUM BLD-MCNC: 9.5 MG/DL (ref 8.5–10.1)
CALCIUM BLD-MCNC: 9.8 MG/DL (ref 8.5–10.1)
MAGNESIUM SERPL-MCNC: 1.9 MG/DL (ref 1.6–2.6)
MAGNESIUM SERPL-MCNC: 1.9 MG/DL (ref 1.6–2.6)
PTH-INTACT SERPL-MCNC: 44.3 PG/ML (ref 18.5–88)
PTH-INTACT SERPL-MCNC: 52.4 PG/ML
PTH-INTACT SERPL-MCNC: 87.6 PG/ML

## 2024-05-17 PROCEDURE — 82310 ASSAY OF CALCIUM: CPT | Performed by: OTOLARYNGOLOGY

## 2024-05-17 PROCEDURE — 83735 ASSAY OF MAGNESIUM: CPT | Performed by: OTOLARYNGOLOGY

## 2024-05-17 PROCEDURE — 88331 PATH CONSLTJ SURG 1 BLK 1SPC: CPT | Performed by: OTOLARYNGOLOGY

## 2024-05-17 PROCEDURE — 83970 ASSAY OF PARATHORMONE: CPT | Performed by: OTOLARYNGOLOGY

## 2024-05-17 PROCEDURE — 0GTP4ZZ RESECTION OF LEFT INFERIOR PARATHYROID GLAND, PERCUTANEOUS ENDOSCOPIC APPROACH: ICD-10-PCS | Performed by: OTOLARYNGOLOGY

## 2024-05-17 PROCEDURE — 88305 TISSUE EXAM BY PATHOLOGIST: CPT | Performed by: OTOLARYNGOLOGY

## 2024-05-17 PROCEDURE — 94799 UNLISTED PULMONARY SVC/PX: CPT

## 2024-05-17 PROCEDURE — 94660 CPAP INITIATION&MGMT: CPT

## 2024-05-17 RX ORDER — CALCITRIOL 0.25 UG/1
0.25 CAPSULE, LIQUID FILLED ORAL DAILY
Status: DISCONTINUED | OUTPATIENT
Start: 2024-05-17 | End: 2024-05-18

## 2024-05-17 RX ORDER — HYDROCODONE BITARTRATE AND ACETAMINOPHEN 5; 325 MG/1; MG/1
2 TABLET ORAL ONCE AS NEEDED
Status: COMPLETED | OUTPATIENT
Start: 2024-05-17 | End: 2024-05-17

## 2024-05-17 RX ORDER — METOCLOPRAMIDE HYDROCHLORIDE 5 MG/ML
10 INJECTION INTRAMUSCULAR; INTRAVENOUS EVERY 8 HOURS PRN
Status: DISCONTINUED | OUTPATIENT
Start: 2024-05-17 | End: 2024-05-17 | Stop reason: HOSPADM

## 2024-05-17 RX ORDER — BUPROPION HYDROCHLORIDE 150 MG/1
150 TABLET ORAL DAILY
Status: DISCONTINUED | OUTPATIENT
Start: 2024-05-17 | End: 2024-05-18

## 2024-05-17 RX ORDER — SODIUM CHLORIDE, SODIUM LACTATE, POTASSIUM CHLORIDE, CALCIUM CHLORIDE 600; 310; 30; 20 MG/100ML; MG/100ML; MG/100ML; MG/100ML
INJECTION, SOLUTION INTRAVENOUS CONTINUOUS
Status: DISCONTINUED | OUTPATIENT
Start: 2024-05-17 | End: 2024-05-18

## 2024-05-17 RX ORDER — ONDANSETRON 2 MG/ML
INJECTION INTRAMUSCULAR; INTRAVENOUS AS NEEDED
Status: DISCONTINUED | OUTPATIENT
Start: 2024-05-17 | End: 2024-05-17 | Stop reason: SURG

## 2024-05-17 RX ORDER — LIDOCAINE HYDROCHLORIDE 10 MG/ML
INJECTION, SOLUTION EPIDURAL; INFILTRATION; INTRACAUDAL; PERINEURAL AS NEEDED
Status: DISCONTINUED | OUTPATIENT
Start: 2024-05-17 | End: 2024-05-17 | Stop reason: SURG

## 2024-05-17 RX ORDER — CALCIUM CARBONATE 500(1250)
1000 TABLET ORAL
Status: DISCONTINUED | OUTPATIENT
Start: 2024-05-17 | End: 2024-05-18

## 2024-05-17 RX ORDER — PREGABALIN 25 MG/1
25 CAPSULE ORAL 2 TIMES DAILY
Status: DISCONTINUED | OUTPATIENT
Start: 2024-05-17 | End: 2024-05-18

## 2024-05-17 RX ORDER — CARBOXYMETHYLCELLULOSE SODIUM 10 MG/ML
1 GEL OPHTHALMIC 2 TIMES DAILY
Status: DISCONTINUED | OUTPATIENT
Start: 2024-05-17 | End: 2024-05-18

## 2024-05-17 RX ORDER — IBUPROFEN 200 MG
200 TABLET ORAL EVERY 6 HOURS PRN
Status: DISCONTINUED | OUTPATIENT
Start: 2024-05-17 | End: 2024-05-18

## 2024-05-17 RX ORDER — HYDROCODONE BITARTRATE AND ACETAMINOPHEN 5; 325 MG/1; MG/1
TABLET ORAL
Status: COMPLETED
Start: 2024-05-17 | End: 2024-05-17

## 2024-05-17 RX ORDER — HYDROMORPHONE HYDROCHLORIDE 1 MG/ML
0.2 INJECTION, SOLUTION INTRAMUSCULAR; INTRAVENOUS; SUBCUTANEOUS EVERY 5 MIN PRN
Status: DISCONTINUED | OUTPATIENT
Start: 2024-05-17 | End: 2024-05-17 | Stop reason: HOSPADM

## 2024-05-17 RX ORDER — ROSUVASTATIN CALCIUM 5 MG/1
5 TABLET, COATED ORAL NIGHTLY
Status: DISCONTINUED | OUTPATIENT
Start: 2024-05-17 | End: 2024-05-18

## 2024-05-17 RX ORDER — CALCIUM CARBONATE 500(1250)
TABLET ORAL
Status: COMPLETED
Start: 2024-05-17 | End: 2024-05-17

## 2024-05-17 RX ORDER — CALCITRIOL 0.25 UG/1
0.25 CAPSULE, LIQUID FILLED ORAL ONCE
Status: COMPLETED | OUTPATIENT
Start: 2024-05-17 | End: 2024-05-17

## 2024-05-17 RX ORDER — KETAMINE HYDROCHLORIDE 50 MG/ML
INJECTION, SOLUTION INTRAMUSCULAR; INTRAVENOUS AS NEEDED
Status: DISCONTINUED | OUTPATIENT
Start: 2024-05-17 | End: 2024-05-17 | Stop reason: SURG

## 2024-05-17 RX ORDER — HYDROMORPHONE HYDROCHLORIDE 1 MG/ML
0.4 INJECTION, SOLUTION INTRAMUSCULAR; INTRAVENOUS; SUBCUTANEOUS EVERY 5 MIN PRN
Status: DISCONTINUED | OUTPATIENT
Start: 2024-05-17 | End: 2024-05-17 | Stop reason: HOSPADM

## 2024-05-17 RX ORDER — HYDROCHLOROTHIAZIDE 25 MG/1
25 TABLET ORAL DAILY
Status: DISCONTINUED | OUTPATIENT
Start: 2024-05-17 | End: 2024-05-18

## 2024-05-17 RX ORDER — ACETAMINOPHEN 500 MG
1000 TABLET ORAL ONCE
Status: DISCONTINUED | OUTPATIENT
Start: 2024-05-17 | End: 2024-05-17 | Stop reason: HOSPADM

## 2024-05-17 RX ORDER — MIDAZOLAM HYDROCHLORIDE 1 MG/ML
INJECTION INTRAMUSCULAR; INTRAVENOUS AS NEEDED
Status: DISCONTINUED | OUTPATIENT
Start: 2024-05-17 | End: 2024-05-17 | Stop reason: SURG

## 2024-05-17 RX ORDER — PREDNISOLONE ACETATE 10 MG/ML
1 SUSPENSION/ DROPS OPHTHALMIC 4 TIMES DAILY
COMMUNITY

## 2024-05-17 RX ORDER — OFLOXACIN 3 MG/ML
1 SOLUTION/ DROPS OPHTHALMIC 4 TIMES DAILY
COMMUNITY

## 2024-05-17 RX ORDER — CALCIUM CARBONATE 500(1250)
1000 TABLET ORAL ONCE
Status: COMPLETED | OUTPATIENT
Start: 2024-05-17 | End: 2024-05-17

## 2024-05-17 RX ORDER — ONDANSETRON 2 MG/ML
4 INJECTION INTRAMUSCULAR; INTRAVENOUS EVERY 6 HOURS PRN
Status: DISCONTINUED | OUTPATIENT
Start: 2024-05-17 | End: 2024-05-18

## 2024-05-17 RX ORDER — CALCITRIOL 0.25 UG/1
CAPSULE, LIQUID FILLED ORAL
Status: COMPLETED
Start: 2024-05-17 | End: 2024-05-17

## 2024-05-17 RX ORDER — SODIUM CHLORIDE, SODIUM LACTATE, POTASSIUM CHLORIDE, CALCIUM CHLORIDE 600; 310; 30; 20 MG/100ML; MG/100ML; MG/100ML; MG/100ML
INJECTION, SOLUTION INTRAVENOUS CONTINUOUS
Status: DISCONTINUED | OUTPATIENT
Start: 2024-05-17 | End: 2024-05-17 | Stop reason: HOSPADM

## 2024-05-17 RX ORDER — ACETAMINOPHEN 500 MG
500 TABLET ORAL EVERY 6 HOURS PRN
Status: DISCONTINUED | OUTPATIENT
Start: 2024-05-17 | End: 2024-05-18

## 2024-05-17 RX ORDER — CYCLOSPORINE 0.5 MG/ML
1 EMULSION OPHTHALMIC EVERY 12 HOURS
Status: DISCONTINUED | OUTPATIENT
Start: 2024-05-17 | End: 2024-05-17 | Stop reason: SDUPTHER

## 2024-05-17 RX ORDER — HYDROMORPHONE HYDROCHLORIDE 1 MG/ML
0.6 INJECTION, SOLUTION INTRAMUSCULAR; INTRAVENOUS; SUBCUTANEOUS EVERY 5 MIN PRN
Status: DISCONTINUED | OUTPATIENT
Start: 2024-05-17 | End: 2024-05-17 | Stop reason: HOSPADM

## 2024-05-17 RX ORDER — ONDANSETRON 2 MG/ML
4 INJECTION INTRAMUSCULAR; INTRAVENOUS EVERY 6 HOURS PRN
Status: DISCONTINUED | OUTPATIENT
Start: 2024-05-17 | End: 2024-05-17 | Stop reason: HOSPADM

## 2024-05-17 RX ORDER — DEXAMETHASONE SODIUM PHOSPHATE 4 MG/ML
VIAL (ML) INJECTION AS NEEDED
Status: DISCONTINUED | OUTPATIENT
Start: 2024-05-17 | End: 2024-05-17 | Stop reason: SURG

## 2024-05-17 RX ORDER — LIDOCAINE HYDROCHLORIDE AND EPINEPHRINE 10; 10 MG/ML; UG/ML
INJECTION, SOLUTION INFILTRATION; PERINEURAL AS NEEDED
Status: DISCONTINUED | OUTPATIENT
Start: 2024-05-17 | End: 2024-05-17 | Stop reason: HOSPADM

## 2024-05-17 RX ORDER — OFLOXACIN 3 MG/ML
1 SOLUTION/ DROPS OPHTHALMIC 4 TIMES DAILY
Status: DISCONTINUED | OUTPATIENT
Start: 2024-05-17 | End: 2024-05-18

## 2024-05-17 RX ORDER — ACETAMINOPHEN 500 MG
1000 TABLET ORAL ONCE AS NEEDED
Status: COMPLETED | OUTPATIENT
Start: 2024-05-17 | End: 2024-05-17

## 2024-05-17 RX ORDER — LATANOPROST 50 UG/ML
1 SOLUTION/ DROPS OPHTHALMIC NIGHTLY
Status: DISCONTINUED | OUTPATIENT
Start: 2024-05-17 | End: 2024-05-18

## 2024-05-17 RX ORDER — PHENYLEPHRINE HCL 10 MG/ML
VIAL (ML) INJECTION AS NEEDED
Status: DISCONTINUED | OUTPATIENT
Start: 2024-05-17 | End: 2024-05-17 | Stop reason: SURG

## 2024-05-17 RX ORDER — HYDROCODONE BITARTRATE AND ACETAMINOPHEN 5; 325 MG/1; MG/1
1 TABLET ORAL ONCE AS NEEDED
Status: COMPLETED | OUTPATIENT
Start: 2024-05-17 | End: 2024-05-17

## 2024-05-17 RX ORDER — ROCURONIUM BROMIDE 10 MG/ML
INJECTION, SOLUTION INTRAVENOUS AS NEEDED
Status: DISCONTINUED | OUTPATIENT
Start: 2024-05-17 | End: 2024-05-17 | Stop reason: SURG

## 2024-05-17 RX ORDER — DEXTROSE, SODIUM CHLORIDE, SODIUM LACTATE, POTASSIUM CHLORIDE, AND CALCIUM CHLORIDE 5; .6; .31; .03; .02 G/100ML; G/100ML; G/100ML; G/100ML; G/100ML
INJECTION, SOLUTION INTRAVENOUS CONTINUOUS
Status: DISCONTINUED | OUTPATIENT
Start: 2024-05-17 | End: 2024-05-18

## 2024-05-17 RX ORDER — PREDNISOLONE ACETATE 10 MG/ML
1 SUSPENSION/ DROPS OPHTHALMIC 4 TIMES DAILY
Status: DISCONTINUED | OUTPATIENT
Start: 2024-05-17 | End: 2024-05-18

## 2024-05-17 RX ORDER — CALCIUM GLUCONATE 20 MG/ML
2 INJECTION, SOLUTION INTRAVENOUS ONCE AS NEEDED
Status: ACTIVE | OUTPATIENT
Start: 2024-05-17 | End: 2024-05-17

## 2024-05-17 RX ORDER — LISINOPRIL 40 MG/1
40 TABLET ORAL DAILY
Status: DISCONTINUED | OUTPATIENT
Start: 2024-05-17 | End: 2024-05-18

## 2024-05-17 RX ORDER — NALOXONE HYDROCHLORIDE 0.4 MG/ML
0.08 INJECTION, SOLUTION INTRAMUSCULAR; INTRAVENOUS; SUBCUTANEOUS AS NEEDED
Status: DISCONTINUED | OUTPATIENT
Start: 2024-05-17 | End: 2024-05-17 | Stop reason: HOSPADM

## 2024-05-17 RX ADMIN — DEXAMETHASONE SODIUM PHOSPHATE 8 MG: 4 MG/ML VIAL (ML) INJECTION at 10:10:00

## 2024-05-17 RX ADMIN — LIDOCAINE HYDROCHLORIDE 50 MG: 10 INJECTION, SOLUTION EPIDURAL; INFILTRATION; INTRACAUDAL; PERINEURAL at 09:54:00

## 2024-05-17 RX ADMIN — ROCURONIUM BROMIDE 5 MG: 10 INJECTION, SOLUTION INTRAVENOUS at 09:52:00

## 2024-05-17 RX ADMIN — KETAMINE HYDROCHLORIDE 25 MG: 50 INJECTION, SOLUTION INTRAMUSCULAR; INTRAVENOUS at 10:00:00

## 2024-05-17 RX ADMIN — PHENYLEPHRINE HCL 50 MCG: 10 MG/ML VIAL (ML) INJECTION at 10:27:00

## 2024-05-17 RX ADMIN — MIDAZOLAM HYDROCHLORIDE 2 MG: 1 INJECTION INTRAMUSCULAR; INTRAVENOUS at 09:52:00

## 2024-05-17 RX ADMIN — ONDANSETRON 4 MG: 2 INJECTION INTRAMUSCULAR; INTRAVENOUS at 10:52:00

## 2024-05-17 RX ADMIN — SODIUM CHLORIDE, SODIUM LACTATE, POTASSIUM CHLORIDE, CALCIUM CHLORIDE: 600; 310; 30; 20 INJECTION, SOLUTION INTRAVENOUS at 09:49:00

## 2024-05-17 NOTE — ANESTHESIA POSTPROCEDURE EVALUATION
[facility]    Karly rGeen Patient Status:  Outpatient in a Bed   Age/Gender 68 year old female MRN [id number]   Location [facility] SURGERY Attending Teto Jules MD   Hosp Day # 0 PCP Kay Villalpando DO       Anesthesia Post-op Note    LEFT  PARATHYROIDECTOMY    Procedure Summary       Date: 05/17/24 Room / Location:  MAIN OR 02 / [facility] MAIN OR    Anesthesia Start: 0949 Anesthesia Stop: 1115    Procedure: LEFT  PARATHYROIDECTOMY (Bilateral: Neck) Diagnosis: (PARATHYROID GLAND DISORDER, VITAMIN D DEFICIENCY, MULTIPLE THYROID NODULES)    Surgeons: Teto Jules MD Anesthesiologist: Romeo Nguyen MD    Anesthesia Type: general ASA Status: 2            Anesthesia Type: No value filed.    Vitals Value Taken Time   /66 05/17/24 1117   Temp 97.9 05/17/24 1117   Pulse 89 05/17/24 1117   Resp 16 05/17/24 1117   SpO2 92% 05/17/24 1116   Vitals shown include unfiled device data.    Patient Location: PACU    Anesthesia Type: general    Airway Patency: patent    Postop Pain Control: adequate    Mental Status: mildly sedated but able to meaningfully participate in the post-anesthesia evaluation    Nausea/Vomiting: none    Cardiopulmonary/Hydration status: stable euvolemic    Complications: no apparent anesthesia related complications    Postop vital signs: stable    Dental Exam: Unchanged from Preop    Patient to be discharged from PACU when criteria met.

## 2024-05-17 NOTE — PLAN OF CARE
A&Ox4. VSS. On room air. . IS encouraged. Telemetry monitoring - NSR. SCDs on BLE. Ankle pumps encouraged. Tolerating clear liquid diet. Last BM 5/17. Voiding freely. Pain controlled. Dressing to anterior neck, C/D/I. Ambulating with standby assist. Plan is to dc home when medically clear. Patient updated on plan of care. Safety precautions in place. Call light within reach.

## 2024-05-17 NOTE — ANESTHESIA PREPROCEDURE EVALUATION
PRE-OP EVALUATION    Patient Name: Karly Green    Admit Diagnosis: PARATHYROID GLAND DISORDER, VITAMIN D DEFICIENCY, MULTIPLE THYROID NODULES    Pre-op Diagnosis: PARATHYROID GLAND DISORDER, VITAMIN D DEFICIENCY, MULTIPLE THYROID NODULES    BILATERAL PARATHYROIDECTOMY    Anesthesia Procedure: BILATERAL PARATHYROIDECTOMY (Bilateral)    Surgeons and Role:     * Teto Jules MD - Primary    Pre-op vitals reviewed.        Body mass index is 42.61 kg/m².    Current medications reviewed.  Hospital Medications:  No current facility-administered medications on file as of 5/17/2024.       Outpatient Medications:     Medications Prior to Admission   Medication Sig Dispense Refill Last Dose   • ROSUVASTATIN 5 MG Oral Tab TAKE ONE TABLET BY MOUTH NIGHTLY 90 tablet 0    • lisinopril 40 MG Oral Tab Take 1 tablet (40 mg total) by mouth daily. 90 tablet 0    • HYDROCHLOROTHIAZIDE 25 MG Oral Tab TAKE 1 TABLET BY MOUTH DAILY 90 tablet 0    • pregabalin 25 MG Oral Cap Take 1 capsule (25 mg total) by mouth 2 (two) times daily. 180 capsule 0    • metFORMIN  MG Oral Tablet 24 Hr TAKE ONE TABLET BY MOUTH DAILY WITH BREAKFAST 90 tablet 1    • DICLOFENAC 50 MG Oral Tab EC TAKE ONE TABLET BY MOUTH TWICE DAILY as needed 60 tablet 0    • BUPROPION  MG Oral Tablet 24 Hr TAKE ONE TABLET BY MOUTH ONE TIME DAILY 90 tablet 0    • latanoprost 0.005 % Ophthalmic Solution       • Magnesium-Calcium-Folic Acid 400-200-1 MG Oral Tab Take 400 mg by mouth daily.      • Multiple Vitamins-Minerals (MULTIPLE VITAMINS/WOMENS OR) Take 1 tablet by mouth daily.        • Ascorbic Acid (VITAMIN C) 250 MG Oral Tab Take 1 tablet (250 mg total) by mouth daily.      • RESTASIS 0.05 % Ophthalmic Emulsion Place 1 drop into both eyes every 12 (twelve) hours.          Allergies: Adhesive tape, Menthol, and Seasonal      Anesthesia Evaluation        Anesthetic Complications  (+) history of anesthetic complications  History of: PONV        GI/Hepatic/Renal      (+) GERD and well controlled      (+) chronic renal disease and CRI                   Cardiovascular      ECG reviewed.  Exercise tolerance: good     MET: >4    (+) obesity  (+) hypertension and well controlled  (+) hyperlipidemia                                  Endo/Other                           (+) arthritis       Pulmonary                    (+) sleep apnea and CPAP      Neuro/Psych      (+) depression  (+) anxiety                          Past Surgical History:   Procedure Laterality Date   • Back surgery     • Excis lumbar disk,one level     • Knee replacement surgery  8/2007    right   • Knee replacement surgery  8/2013    left   • Other  7/11/2016    LUMBAR LAMINECTOMY 1 LEVEL   • Spinal fusion  2017    L4-L5 posterolateral spinal fusion with L4-L5 decompressive lumbar laminectomy with nerve root decompression     Social History     Socioeconomic History   • Marital status:    Occupational History   • Occupation: Retired   Tobacco Use   • Smoking status: Never   • Smokeless tobacco: Never   Vaping Use   • Vaping status: Never Used   Substance and Sexual Activity   • Alcohol use: No     Alcohol/week: 0.0 standard drinks of alcohol     Comment: 4 drinks per year/rare   • Drug use: No   • Sexual activity: Not Currently   Other Topics Concern   • Caffeine Concern Yes     Comment: tea or 1 mountain dew daily   • Exercise Yes     Comment: 1-2x a week   • Seat Belt Yes   • Self-Exams No     History   Drug Use No     Available pre-op labs reviewed.               Airway      Mallampati: II  Mouth opening: >3 FB  TM distance: > 6 cm  Neck ROM: full Cardiovascular    Cardiovascular exam normal.         Dental    Dentition appears grossly intact         Pulmonary    Pulmonary exam normal.                 Other findings        ASA: 2   Plan: general  NPO status verified and patient meets guidelines.          Plan/risks discussed with: patient and spouse  Use of blood product(s) discussed  with: patient and spouse    Consented to blood products.      Present on Admission:  **None**

## 2024-05-17 NOTE — PROGRESS NOTES
Pharmacy Note: Dietary Supplement Discontinuation Per Policy    Magnesium-calcium-folic acid has been discontinued on Karly Green per policy. This supplement may be restarted upon discharge using the medication reconciliation process.    Thank you,   Roro Trevino, PharmD  5/17/2024,  2:35 PM

## 2024-05-17 NOTE — ANESTHESIA PROCEDURE NOTES
Airway  Date/Time: 5/17/2024 9:56 AM  Urgency: elective      General Information and Staff    Patient location during procedure: OR  Anesthesiologist: Romeo Nguyen MD  Performed: anesthesiologist   Performed by: Romeo Nguyen MD  Authorized by: Romeo Nguyen MD      Indications and Patient Condition  Indications for airway management: anesthesia  Spontaneous Ventilation: absent  Sedation level: deep  Preoxygenated: yes  Patient position: sniffing  Mask difficulty assessment: 1 - vent by mask    Final Airway Details  Final airway type: endotracheal airway      Successful airway: NIM tube  Cuffed: yes   Successful intubation technique: direct laryngoscopy  Facilitating devices/methods: intubating stylet  Endotracheal tube insertion site: oral  Blade: Marie  Blade size: #3  NIM tube Size: 7.0  Cormack-Lehane Classification: grade IIB - view of arytenoids or posterior of glottis only  Placement verified by: capnometry   Measured from: lips  ETT to lips (cm): 22  Number of attempts at approach: 1  Number of other approaches attempted: 0

## 2024-05-17 NOTE — BRIEF OP NOTE
Pre-Operative Diagnosis: PARATHYROID GLAND DISORDER, VITAMIN D DEFICIENCY, MULTIPLE THYROID NODULES     Post-Operative Diagnosis: PARATHYROID GLAND DISORDER, VITAMIN D DEFICIENCY, MULTIPLE THYROID NODULES      Procedure Performed:   LEFT  PARATHYROIDECTOMY    Surgeons and Role:     * Teto Jules MD - Primary    Assistant(s):  Surgical Assistant.: Dominick Brown     Surgical Findings: hypercellular par with norm pth      Specimen: to path      Estimated Blood Loss: Blood Output: 10 mL (5/17/2024 10:58 AM)      Dictation Number:        Teto Jules MD  5/17/2024  11:11 AM

## 2024-05-17 NOTE — INTERVAL H&P NOTE
Pre-op Diagnosis: PARATHYROID GLAND DISORDER, VITAMIN D DEFICIENCY, MULTIPLE THYROID NODULES    The above referenced H&P was reviewed by Teto Jules MD on 5/17/2024, the patient was examined and no significant changes have occurred in the patient's condition since the H&P was performed.  I discussed with the patient and/or legal representative the potential benefits, risks and side effects of this procedure; the likelihood of the patient achieving goals; and potential problems that might occur during recuperation.  I discussed reasonable alternatives to the procedure, including risks, benefits and side effects related to the alternatives and risks related to not receiving this procedure.  We will proceed with procedure as planned.

## 2024-05-18 VITALS
TEMPERATURE: 98 F | DIASTOLIC BLOOD PRESSURE: 89 MMHG | BODY MASS INDEX: 38.57 KG/M2 | SYSTOLIC BLOOD PRESSURE: 117 MMHG | HEART RATE: 71 BPM | HEIGHT: 66 IN | OXYGEN SATURATION: 91 % | RESPIRATION RATE: 18 BRPM | WEIGHT: 240 LBS

## 2024-05-18 LAB
CALCIUM BLD-MCNC: 9.9 MG/DL (ref 8.5–10.1)
MAGNESIUM SERPL-MCNC: 1.6 MG/DL (ref 1.6–2.6)

## 2024-05-18 PROCEDURE — 83735 ASSAY OF MAGNESIUM: CPT | Performed by: OTOLARYNGOLOGY

## 2024-05-18 PROCEDURE — 82310 ASSAY OF CALCIUM: CPT | Performed by: OTOLARYNGOLOGY

## 2024-05-18 RX ORDER — FAMOTIDINE 20 MG/1
40 TABLET, FILM COATED ORAL ONCE
Status: COMPLETED | OUTPATIENT
Start: 2024-05-18 | End: 2024-05-18

## 2024-05-18 RX ORDER — CA/D3/MAG OX/ZINC/COP/MANG/BOR 600 MG-800
2 TABLET,CHEWABLE ORAL 2 TIMES DAILY
Qty: 60 TABLET | Refills: 0 | Status: SHIPPED | OUTPATIENT
Start: 2024-05-18 | End: 2024-06-17

## 2024-05-18 NOTE — PLAN OF CARE
Alert and Oriented x4. On RA. VSS. Tele-NS. Dressing to anterior neck in place, Moderate pain at this time controlled by PRN medications, see MAR for actions. Denies N/T. Voiding freely. Tolerating diet. Denies N/V. Call light within reach at this time.    Plan: Pain management, home when cleared

## 2024-05-18 NOTE — PROGRESS NOTES
Assume care @ 0730  AO X4, Verbally responsive, RA  NSR on Tele. S/P left parathyroidectomy day 1. Surgical site clean with steri strips, dry.   Denies pain  Continent. X1 walker  IVFs LR  @ 100ml/hr  Good appetite meals. Fall and safety precautions in place.  at bedside.

## 2024-05-18 NOTE — OPERATIVE REPORT
[facility]    PATIENT'S NAME: ÁNGELA ANTON   ATTENDING PHYSICIAN: Teto Jules M.D.   OPERATING PHYSICIAN: Teto Jules M.D.   PATIENT ACCOUNT#:   [id number]    LOCATION:  [address] [place] [address]  MEDICAL RECORD #:   [id number]       YOB: 1955  ADMISSION DATE:       05/17/2024      OPERATION DATE:  05/17/2024    OPERATIVE REPORT      PREOPERATIVE DIAGNOSIS:  Hyperparathyroidism.    POSTOPERATIVE DIAGNOSIS:  Hyperparathyroidism.    PROCEDURE:  Left parathyroid adenoma resection.    ASSISTANT:  CARLEY Hodge.    ESTIMATED BLOOD LOSS:  Approximately 5 mL.    COMPLICATIONS:  None.    DISPOSITION:  To recovery room in stable condition.    INDICATIONS:  This is a 68-year-old female who is recommended to undergo the above operation, found to have hypercalcemia, hyperparathyroidism.  Imaging not supportive of any one identifiable gland issue.  Recommended to undergo.  Understanding risks and benefits including bleeding, infection, recurrence, hypocalcemia, hypoparathyroidism, patient signed the consent form.    OPERATIVE TECHNIQUE:  Patient brought to the operating, placed in supine position, given a general anesthetic via endotracheal tube with laryngeal monitoring system.  The patient was placed on a shoulder roll in head-extended position.  Intraoperative parathyroid hormone level was drawn.  The patient had dissection down on the left inferior pole showing what appeared to be an enlarged hypercellular parathyroid and abutting up against thyroid gland adjacent to a smaller normal-appearing parathyroid.  The normal-appearing parathyroid was left in place, and the larger one was removed consistent with hypercellular gland.  The PTH level did drop significantly but not by 50%.  The wound was reinspected after Valsalva showing adequate hemostasis, and then the wound was closed with 3-0 Vicryl through strap muscles, 4-0 Monocryl subcutaneously, Steri-Strips on the surface.       Dictated By Teto Jules M.D.  d: 05/17/2024 11:31:10  t: 05/17/2024 18:58:52  Job [id number]  JJD/   MED

## 2024-05-18 NOTE — PROGRESS NOTES
Pt discharged to home. All discharged information/instructions given to pt and spouse. Pt verbalized full understanding of all instructions. Pt picked up by family via private car. Left the unit in safe condition.

## 2024-05-18 NOTE — PROGRESS NOTES
Patient is awake and alert s/p parathyroidectomy.  Voice is strong, denies any pain, dysphagia, numbness, tingling or muscle cramping.  Pain is well tolerated. Eating and drinking is well tolerated.  Overall no significant complaints, patient doing very well.  Vitals:    05/18/24 0329   BP: 133/81   Pulse: 68   Resp: 18   Temp: 97.5 °F (36.4 °C)     Lab Results   Component Value Date    CA 9.9 05/18/2024    MG 1.6 05/18/2024     Neck: incision clean, dry and intact, no hematoma, no swelling or erythema noted.       A/P PO Parathyroidectomy  -discontinue IV and discharge patient home  -Avoid looking up or heavy lifting over 5 lbs for 2 weeks  -Keep site dry for the first 5 days  -Tylenol as needed for any pain  -if any numbness tingling or muscle cramping take 2 tums and call the office immediately you will begin taking caltrate d 2 tab po bid   -follow up in 7-10 days in the office

## 2024-05-18 NOTE — DISCHARGE INSTRUCTIONS
Avoid any upward head motion or heavy lifting over 5 lbs for the next 2 weeks   Keep site dry for the next 2 weeks  If any numbness tingling or muscle cramping take 2 tums and call the office immediately at [phone]  You will begin taking caltrate d 2 tablets twice daily   Follow up in office in 2 weeks  Tylenol as needed for any pain

## 2024-05-20 ENCOUNTER — PATIENT OUTREACH (OUTPATIENT)
Dept: CASE MANAGEMENT | Age: 69
End: 2024-05-20

## 2024-06-14 ENCOUNTER — OFFICE VISIT (OUTPATIENT)
Dept: FAMILY MEDICINE CLINIC | Facility: CLINIC | Age: 69
End: 2024-06-14

## 2024-06-14 VITALS
WEIGHT: 269 LBS | HEART RATE: 100 BPM | SYSTOLIC BLOOD PRESSURE: 130 MMHG | BODY MASS INDEX: 44.28 KG/M2 | RESPIRATION RATE: 20 BRPM | DIASTOLIC BLOOD PRESSURE: 72 MMHG | HEIGHT: 65.25 IN

## 2024-06-14 DIAGNOSIS — G47.33 OSA (OBSTRUCTIVE SLEEP APNEA): ICD-10-CM

## 2024-06-14 DIAGNOSIS — E66.01 CLASS 3 SEVERE OBESITY DUE TO EXCESS CALORIES WITHOUT SERIOUS COMORBIDITY WITH BODY MASS INDEX (BMI) OF 40.0 TO 44.9 IN ADULT (HCC): ICD-10-CM

## 2024-06-14 DIAGNOSIS — Z12.31 VISIT FOR SCREENING MAMMOGRAM: ICD-10-CM

## 2024-06-14 DIAGNOSIS — R79.89 TSH EXCESS: ICD-10-CM

## 2024-06-14 DIAGNOSIS — Q03.9 CONGENITAL HYDROCEPHALUS, UNSPECIFIED (HCC): ICD-10-CM

## 2024-06-14 DIAGNOSIS — Z78.0 POSTMENOPAUSAL: ICD-10-CM

## 2024-06-14 DIAGNOSIS — N18.31 STAGE 3A CHRONIC KIDNEY DISEASE (HCC): ICD-10-CM

## 2024-06-14 DIAGNOSIS — E78.2 MIXED HYPERLIPIDEMIA: ICD-10-CM

## 2024-06-14 DIAGNOSIS — M54.16 BILATERAL LUMBAR RADICULOPATHY: ICD-10-CM

## 2024-06-14 DIAGNOSIS — F32.1 CURRENT MODERATE EPISODE OF MAJOR DEPRESSIVE DISORDER WITHOUT PRIOR EPISODE (HCC): ICD-10-CM

## 2024-06-14 DIAGNOSIS — K21.9 GASTROESOPHAGEAL REFLUX DISEASE, UNSPECIFIED WHETHER ESOPHAGITIS PRESENT: ICD-10-CM

## 2024-06-14 DIAGNOSIS — M48.062 SPINAL STENOSIS OF LUMBAR REGION WITH NEUROGENIC CLAUDICATION: ICD-10-CM

## 2024-06-14 DIAGNOSIS — Z00.00 ENCOUNTER FOR ANNUAL HEALTH EXAMINATION: Primary | ICD-10-CM

## 2024-06-14 DIAGNOSIS — I10 ESSENTIAL HYPERTENSION, BENIGN: ICD-10-CM

## 2024-06-14 DIAGNOSIS — R73.03 PREDIABETES: ICD-10-CM

## 2024-06-14 RX ORDER — KETOROLAC TROMETHAMINE 5 MG/ML
2 SOLUTION OPHTHALMIC
COMMUNITY
Start: 2024-05-23

## 2024-06-14 RX ORDER — ROSUVASTATIN CALCIUM 5 MG/1
5 TABLET, COATED ORAL NIGHTLY
Qty: 90 TABLET | Refills: 0 | Status: SHIPPED | OUTPATIENT
Start: 2024-06-14

## 2024-06-14 NOTE — PROGRESS NOTES
Subjective:   Karly Green is a 68 year old female who presents for a MA (Medicare Advantage) Supervisit (Once per calendar year) and scheduled follow up of multiple significant but stable problems.   Mom is entering into hospice and recently had to put dog down.   Current medical conditions:  Hyperparathyroidism, Thyroid nodule: Now s/p L parathyroidectomy. Follwos with ENT, Dr. Jules        Acquired spondylolisthesis      Degeneration of lumbar or lumbosacral intervertebral disc     Lumbago                Back pain has been controlled. On ibuprofen which seems to control pain. Stiffness always present. Pain worse in the morning. Takes flexeril prn. On lyrica.     Hyperlipidemia                 On crestor 5mg and omega 3 fatty acids     Essential hypertension, benign                 On lisinopril, hydrochlorothiazide. Denies Headaches, SOB, vision changes, chest pain and LE swelling.     GERD (gastroesophageal reflux disease)                 Previously on omeprazole. Now manages with TUMs          Obesity, Class III, BMI 40-49.9 (morbid obesity) ([facility])                 Previously followed in weight loss clinic. Not much improvement. Now trying to get back into exercise and eating healthy.      Prediabetes                 Last A1c 5.9%. On metformin.     Obstructive sleep apnea (adult)                 On CPAP.     CKD stage III                 Last Creatinine at baseline 1.13     MDD, anxiety: On wellbutrin. Stable on current medications.      MCI, congenital hydrocephalus: Follows with neurology (Faizan). Improved after CPAP use consistently     Health Maintenance:  Vaccines: reviewed as below. Indicated today: shingrix-thinks she had this  Immunization History   Administered Date(s) Administered    >=3 YRS TRI  MULTIDOSE VIAL (21237) FLU CLINIC 09/12/2017    Covid-19 Vaccine Pfizer 30 mcg/0.3 ml 03/03/2021, 03/17/2021, 10/13/2021    Covid-19 Vaccine Pfizer Bivalent 30mcg/0.3mL 09/21/2022    Covid-19  Vaccine Pfizer [place]-Sucrose 30 mcg/0.3 ml 2022    FLU VAC High Dose 65 YRS & Older PRSV Free (12025) 2022, 2023    FLUZONE 6 months and older PFS 0.5 ml (50300) 10/31/2015, 10/05/2018, 10/09/2019, 10/06/2020    Fluvirin, 3 Years & >, Im 2017    Influenza 2007, 10/09/2011, 10/22/2012, 10/16/2015, 2016, 10/09/2019, 10/06/2020    Influenza(Afluria)0.5ml QIV PFS 10/06/2018    Pfizer Covid-19 Vaccine 30mcg/0.3ml 12yrs+ (6094-7112) 10/12/2023    Pneumococcal Conjugate PCV20 10/27/2022    Pneumovax 23 10/21/2021    TDAP 12/15/2018     Obesity screening: Body mass index is 44.42 kg/m².  Diabetes screening:  due  Hypercholesterolemia screening:  Lab Results   Component Value Date    HDL 69 (H) 2023    HDL 66 (H) 2023    HDL 69 (H) 10/21/2021     Lab Results   Component Value Date     (H) 2023     (H) 2023     (H) 10/21/2021     Lab Results   Component Value Date    TRIG 125 2023    TRIG 100 2023    TRIG 90 10/21/2021        Depression screen:   Depression Screening (PHQ-2/PHQ-9): Over the LAST 2 WEEKS   Little interest or pleasure in doing things: Not at all    Feeling down, depressed, or hopeless: Not at all    PHQ-2 SCORE: 0        Cervical Cancer screening: n/a  Colon Cancer screening: Last screenin2021  Breast Cancer screening: Last mammogram: 2023  Osteoporosis: Last DEXA: ; Hx of treatment: none    History/Other:   Fall Risk Assessment:   She has been screened for Falls and is High Risk. Fall Prevention information provided to patient in After Visit Summary.    Do you have 3 or more medical conditions?: 1-Yes  Have you fallen in the last 12 months?: 1-Yes  Do you accidently lose urine?: 1-Yes  Do you have difficulty seeing?: 0-No  Do you have any difficulty walking or getting up?: 0-No  Do you have any tripping hazards?: 0-No  Are you on multiple medications?: 1-Yes  Does pain affect your day to day activities?:  1-Yes  Fall/Risk Scorin  Do you feel unsteady when standing or walking?: Yes  Do you worry about falling?: No  Have you fallen in the past year?: No     Cognitive Assessment:   Abnormal  What day of the week is this?: Incorrect  What month is it?: Correct  What year is it?: Correct  Recall \"Ball\": Correct  Recall \"Flag\": Incorrect  Recall \"Tree\": Correct    Functional Ability/Status:   Karly Green has a completely normal functional assessment. See flowsheet for details.  .aha    Depression Screening (PHQ-2/PHQ-9): PHQ-2 SCORE: 0  , done 2024        Advanced Directives:   She does NOT have a Living Will. [Do you have a living will?: Yes]  She does NOT have a Power of  for Health Care. [Do you have a healthcare power of ?: Yes]  Discussed Advance Care Planning with patient (and family/surrogate if present). Standard forms made available to patient in After Visit Summary.      Patient Active Problem List   Diagnosis    Hyperlipidemia    Prediabetes    Lumbar stenosis    Essential hypertension    NAYLA (obstructive sleep apnea)    Gastroesophageal reflux disease    Bilateral lumbar radiculopathy    Congenital hydrocephalus, unspecified ([facility])    Class 3 severe obesity without serious comorbidity with body mass index (BMI) of 40.0 to 44.9 in adult ([facility])    Current moderate episode of major depressive disorder without prior episode ([facility])    CKD (chronic kidney disease) stage 3, GFR 30-59 ml/min ([facility])     Allergies:  She is allergic to adhesive tape, menthol, and seasonal.    Current Medications:  Outpatient Medications Marked as Taking for the 24 encounter (Office Visit) with Kay Villalpando,    Medication Sig    ketorolac 0.5 % Ophthalmic Solution 2 drops.    [] Calcium Carbonate-Vit D-Min (CALTRATE 600+D PLUS MINERALS) 600-800 MG-UNIT Oral Chew Tab Chew 2 tablets by mouth in the morning and 2 tablets before bedtime. (Patient taking differently: Chew 2 tablets by mouth daily.)     prednisoLONE 1 % Ophthalmic Suspension Place 1 drop into both eyes 4 (four) times daily.    ofloxacin 0.3 % Ophthalmic Solution Place 1 drop into both eyes 4 (four) times daily.    RESTASIS 0.05 % Ophthalmic Emulsion Place 1 drop into both eyes every 12 (twelve) hours.    [DISCONTINUED] ROSUVASTATIN 5 MG Oral Tab TAKE ONE TABLET BY MOUTH NIGHTLY    lisinopril 40 MG Oral Tab Take 1 tablet (40 mg total) by mouth daily.    HYDROCHLOROTHIAZIDE 25 MG Oral Tab TAKE 1 TABLET BY MOUTH DAILY    pregabalin 25 MG Oral Cap Take 1 capsule (25 mg total) by mouth 2 (two) times daily.    metFORMIN  MG Oral Tablet 24 Hr TAKE ONE TABLET BY MOUTH DAILY WITH BREAKFAST    BUPROPION  MG Oral Tablet 24 Hr TAKE ONE TABLET BY MOUTH ONE TIME DAILY    latanoprost 0.005 % Ophthalmic Solution Place 1 drop into both eyes nightly.    Magnesium-Calcium-Folic Acid 400-200-1 MG Oral Tab Take 400 mg by mouth daily.    Multiple Vitamins-Minerals (MULTIPLE VITAMINS/WOMENS OR) Take 1 tablet by mouth daily.      Ascorbic Acid (VITAMIN C) 250 MG Oral Tab Take 1 tablet (250 mg total) by mouth daily.       Medical History:  She  has a past medical history of Anxiety state, Arthritis, Back pain, Back problem, Colon polyp (01/04/2020), Depression, Disorder of thyroid, Esophageal reflux, H/O total knee replacement (06/19/2013), Heartburn, Hemorrhoids, HIGH BLOOD PRESSURE, High cholesterol, History of adenomatous polyp of colon (12/06/2021), Impacted cerumen, Indigestion, Obesity, NAYLA (obstructive sleep apnea) (PSG 10-01-15), Osteoarthrosis, unspecified whether generalized or localized, unspecified site, Other and unspecified hyperlipidemia, PONV (postoperative nausea and vomiting), Prediabetes, Renal disorder, Sleep apnea, Unspecified essential hypertension, Visual impairment, and Wears glasses.  Surgical History:  She  has a past surgical history that includes other (7/11/2016); spinal fusion (2017); excis lumbar disk,one level; back surgery; knee  replacement surgery (8/2007); and knee replacement surgery (8/2013).   Family History:  Her family history includes Breast Cancer (age of onset: 50) in her sister; Cancer in her sister; Diabetes in her father; Heart Disease in her father and mother; NAYLA in her brother; Other in her father.  Social History:  She  reports that she has never smoked. She has never used smokeless tobacco. She reports that she does not drink alcohol and does not use drugs.    Tobacco:  She has never smoked tobacco.    CAGE Alcohol Screen:   CAGE screening score of 0 on 6/28/2024, showing low risk of alcohol abuse.      Patient Care Team:  Kay Villalpando DO as PCP - General (Family Medicine)  Alexus Joseph APRN (Family Practice)  Praful Norris PA-C (Physician Assistant)  Praful Hernandez MD as Consulting Physician (NEUROSURGERY)  Ok Cavazos PA as Consulting Physician (Physician Assistant)  Rubin Avalos RD (Dietitian)  Jaison Bravo MD as Consulting Physician (NEUROSURGERY)  Sasha Frias DO as Consulting Physician (NEUROLOGY)  Audra Bone APRN (Nurse Practitioner Family)  Bridget Gloria MD as Consulting Physician (NEUROLOGY)    Review of Systems     Negative except above    Objective:   Physical Exam  General Appearance:  Alert, cooperative, no distress, appears stated age   Head:  Normocephalic, without obvious abnormality, atraumatic   Eyes:  conjunctiva/corneas clear, EOM's intact both eyes   Ears:  Normal TM's and external ear canals, both ears   Throat: Lips, mucosa, and tongue normal; teeth and gums normal   Neck: Supple, symmetrical, trachea midline, no adenopathy;  thyroid: not enlarged, symmetric, no tenderness/mass/nodules; no carotid bruit or JVD   Lungs:   Clear to auscultation bilaterally, respirations unlabored   Heart:  Regular rate and rhythm, S1 and S2 normal, no murmur, rub, or gallop   Abdomen:   Soft, non-tender, bowel sounds active all four quadrants,  no masses, no organomegaly    Pelvic: Deferred   Extremities: Extremities normal, atraumatic, no cyanosis or edema   Neurologic: Grossly Normal         /72   Pulse 100   Resp 20   Ht 5' 5.25\" (1.657 m)   Wt 269 lb (122 kg)   LMP 07/01/2007   BMI 44.42 kg/m²  Estimated body mass index is 44.42 kg/m² as calculated from the following:    Height as of this encounter: 5' 5.25\" (1.657 m).    Weight as of this encounter: 269 lb (122 kg).    Medicare Hearing Assessment:   Hearing Screening    Screening Method: Whisper Test  Whisper Test Result: Pass         Visual Acuity:   Right Eye Visual Acuity: Corrected Right Eye Chart Acuity: 20/40   Left Eye Visual Acuity: Corrected Left Eye Chart Acuity: 20/60   Both Eyes Visual Acuity: Corrected Both Eyes Chart Acuity: 20/40            Assessment & Plan:   Karly Green is a 68 year old female who presents for a Medicare Assessment.     1. Encounter for annual health examination (Primary)  2. Mixed hyperlipidemia  -    Continue Crestor.  Recheck lipid Panel; Future; Expected date: 06/14/2024  3. Essential hypertension, benign  Normotensive today. Will cont current therapy  4. Prediabetes  -    Continue metformin.  Update Hemoglobin A1C; Future; Expected date: 06/14/2024  5. Stage 3a chronic kidney disease (HCC)  Creatinine at baseline.  Will update labs.  -     CBC With Differential With Platelet; Future; Expected date: 06/14/2024  -     Comp Metabolic Panel (14); Future; Expected date: 06/14/2024  6. TSH excess  -     Will recheck TSH W Reflex To Free T4; Future; Expected date: 06/14/2024  7. Postmenopausal  -     XR DEXA BONE DENSITOMETRY (CPT=77080); Future; Expected date: 06/14/2024  8. Visit for screening mammogram  -     [facility] BEATRIZ 2D+3D SCREENING BILAT (CPT=77067/79176); Future; Expected date: 06/14/2024  9. Congenital hydrocephalus, unspecified (HCC)  Stable.  Follows with neurology.  10. Class 3 severe obesity due to excess calories without serious comorbidity with body mass index (BMI)  of 40.0 to 44.9 in adult (HCC)  Continue diet and exercise changes as able   11. Current moderate episode of major depressive disorder without prior episode (HCC)  Stable on Wellbutrin continue.  13. Spinal stenosis of lumbar region with neurogenic claudication  Symptoms present but stable.  Will continue to monitor.  Continue Lyrica.  14. Bilateral lumbar radiculopathy  Symptoms present but stable.  Continue Lyrica.  15. Gastroesophageal reflux disease, unspecified whether esophagitis present  Discontinue PPI therapy.  Symptoms well-controlled.  Continue monitoring  16. NAYLA (obstructive sleep apnea)  Continue CPAP    The patient indicates understanding of these issues and agrees to the plan.  Continue with current treatment plan.  Reinforced healthy diet, lifestyle, and exercise.    RTC 5 months  Kay Villalpando DO, 6/14/2024     Supplementary Documentation:   General Health:  In the past six months, have you lost more than 10 pounds without trying?: 2 - No  Has your appetite been poor?: No  Type of Diet: Balanced  How does the patient maintain a good energy level?: Appropriate Exercise  How would you describe your daily physical activity?: Light  How would you describe your current health state?: Good  How do you maintain positive mental well-being?: Social Interaction  Have you had any immunizations at another office such as Influenza, Hepatitis B, Tetanus, or Pneumococcal?: No       Karly Green's SCREENING SCHEDULE   Tests on this list are recommended by your physician but may not be covered, or covered at this frequency, by your insurer.   Please check with your insurance carrier before scheduling to verify coverage.   PREVENTATIVE SERVICES FREQUENCY &  COVERAGE DETAILS LAST COMPLETION DATE   Diabetes Screening    Fasting Blood Sugar /  Glucose    One screening every 12 months if never tested or if previously tested but not diagnosed with pre-diabetes   One screening every 6 months if diagnosed with  pre-diabetes Lab Results   Component Value Date     (H) 06/26/2023        Cardiovascular Disease Screening    Lipid Panel  Cholesterol  Lipoprotein (HDL)  Triglycerides Covered every 5 years for all Medicare beneficiaries without apparent signs or symptoms of cardiovascular disease Lab Results   Component Value Date    CHOLEST 208 (H) 06/26/2023    HDL 69 (H) 06/26/2023     (H) 06/26/2023    TRIG 125 06/26/2023         Electrocardiogram (EKG)   Covered if needed at Welcome to Medicare, and non-screening if indicated for medical reasons 05/10/2024      Ultrasound Screening for Abdominal Aortic Aneurysm (AAA) Covered once in a lifetime for one of the following risk factors    Men who are 65-75 years old and have ever smoked    Anyone with a family history -     Colorectal Cancer Screening  Covered for ages 50-85; only need ONE of the following:    Colonoscopy   Covered every 10 years    Covered every 2 years if patient is at high risk or previous colonoscopy was abnormal 12/06/2021    Health Maintenance   Topic Date Due    Colorectal Cancer Screening  12/06/2028       Flexible Sigmoidoscopy   Covered every 4 years -    Fecal Occult Blood Test Covered annually -   Bone Density Screening    Bone density screening    Covered every 2 years after age 65 if diagnosed with risk of osteoporosis or estrogen deficiency.    Covered yearly for long-term glucocorticoid medication use (Steroids) Last Dexa Scan:    XR DEXA BONE DENSITOMETRY (CPT=77080) 11/11/2021      No recommendations at this time   Pap and Pelvic    Pap   Covered every 2 years for women at normal risk; Annually if at high risk 02/20/2020  No recommendations at this time    Chlamydia Annually if high risk -  No recommendations at this time   Screening Mammogram    Mammogram     Recommend annually for all female patients aged 40 and older    One baseline mammogram covered for patients aged 35-39 05/19/2023    Health Maintenance   Topic Date Due     Mammogram  05/19/2024       Immunizations    Influenza Covered once per flu season  Please get every year 11/07/2023  No recommendations at this time    Pneumococcal Each vaccine ([id number] & [id number]) covered once after 65 Prevnar 13: -    [id number]: 10/21/2021     No recommendations at this time    Hepatitis B One screening covered for patients with certain risk factors   -  No recommendations at this time    Tetanus Toxoid Not covered by Medicare Part B unless medically necessary (cut with metal); may be covered with your pharmacy prescription benefits -    Tetanus, Diptheria and Pertusis TD and TDaP Not covered by Medicare Part B -  No recommendations at this time    Zoster Not covered by Medicare Part B; may be covered with your pharmacy  prescription benefits -  Zoster Vaccines(1 of 2) Never done     Annual Monitoring of Persistent Medications (ACE/ARB, digoxin diuretics, anticonvulsants)    Potassium Annually Lab Results   Component Value Date    K 4.0 06/26/2023         Creatinine   Annually Lab Results   Component Value Date    CREATSERUM 1.13 (H) 06/26/2023         BUN Annually Lab Results   Component Value Date    BUN 28 (H) 06/26/2023       Drug Serum Conc Annually No results found for: \"DIGOXIN\", \"DIG\", \"VALP\"

## 2024-06-14 NOTE — TELEPHONE ENCOUNTER
Requested Prescriptions     Pending Prescriptions Disp Refills    ROSUVASTATIN 5 MG Oral Tab [Pharmacy Med Name: Rosuvastatin Calcium Oral Tablet 5 MG] 90 tablet 0     Sig: TAKE ONE TABLET BY MOUTH NIGHTLY     LOV 6/14/2024     Patient was asked to follow-up in: Follow-up not documented in note    Appointment scheduled: Visit date not found     Medication refilled per protocol.

## 2024-06-28 NOTE — PATIENT INSTRUCTIONS
Karly Green's SCREENING SCHEDULE   Tests on this list are recommended by your physician but may not be covered, or covered at this frequency, by your insurer.   Please check with your insurance carrier before scheduling to verify coverage.   PREVENTATIVE SERVICES FREQUENCY &  COVERAGE DETAILS LAST COMPLETION DATE   Diabetes Screening    Fasting Blood Sugar /  Glucose    One screening every 12 months if never tested or if previously tested but not diagnosed with pre-diabetes   One screening every 6 months if diagnosed with pre-diabetes Lab Results   Component Value Date     (H) 06/26/2023        Cardiovascular Disease Screening    Lipid Panel  Cholesterol  Lipoprotein (HDL)  Triglycerides Covered every 5 years for all Medicare beneficiaries without apparent signs or symptoms of cardiovascular disease Lab Results   Component Value Date    CHOLEST 208 (H) 06/26/2023    HDL 69 (H) 06/26/2023     (H) 06/26/2023    TRIG 125 06/26/2023         Electrocardiogram (EKG)   Covered if needed at Welcome to Medicare, and non-screening if indicated for medical reasons 05/10/2024      Ultrasound Screening for Abdominal Aortic Aneurysm (AAA) Covered once in a lifetime for one of the following risk factors   • Men who are 65-75 years old and have ever smoked   • Anyone with a family history -     Colorectal Cancer Screening  Covered for ages 50-85; only need ONE of the following:    Colonoscopy   Covered every 10 years    Covered every 2 years if patient is at high risk or previous colonoscopy was abnormal 12/06/2021    Health Maintenance   Topic Date Due   • Colorectal Cancer Screening  12/06/2028       Flexible Sigmoidoscopy   Covered every 4 years -    Fecal Occult Blood Test Covered annually -   Bone Density Screening    Bone density screening    Covered every 2 years after age 65 if diagnosed with risk of osteoporosis or estrogen deficiency.    Covered yearly for long-term glucocorticoid medication use  (Steroids) Last Dexa Scan:    XR DEXA BONE DENSITOMETRY (CPT=77080) 11/11/2021      No recommendations at this time   Pap and Pelvic    Pap   Covered every 2 years for women at normal risk; Annually if at high risk 02/20/2020  No recommendations at this time    Chlamydia Annually if high risk -  No recommendations at this time   Screening Mammogram    Mammogram     Recommend annually for all female patients aged 40 and older    One baseline mammogram covered for patients aged 35-39 05/19/2023    Health Maintenance   Topic Date Due   • Mammogram  05/19/2024       Immunizations    Influenza Covered once per flu season  Please get every year 11/07/2023  No recommendations at this time    Pneumococcal Each vaccine ([id number] & [id number]) covered once after 65 Prevnar 13: -    Aulirrtle55: 10/21/2021     No recommendations at this time    Hepatitis B One screening covered for patients with certain risk factors   -  No recommendations at this time    Tetanus Toxoid Not covered by Medicare Part B unless medically necessary (cut with metal); may be covered with your pharmacy prescription benefits -    Tetanus, Diptheria and Pertusis TD and TDaP Not covered by Medicare Part B -  No recommendations at this time    Zoster Not covered by Medicare Part B; may be covered with your pharmacy  prescription benefits -  Zoster Vaccines(1 of 2) Never done     Annual Monitoring of Persistent Medications (ACE/ARB, digoxin diuretics, anticonvulsants)    Potassium Annually Lab Results   Component Value Date    K 4.0 06/26/2023         Creatinine   Annually Lab Results   Component Value Date    CREATSERUM 1.13 (H) 06/26/2023         BUN Annually Lab Results   Component Value Date    BUN 28 (H) 06/26/2023       Drug Serum Conc Annually No results found for: \"DIGOXIN\", \"DIG\", \"VALP\"

## 2024-07-08 DIAGNOSIS — I10 ESSENTIAL HYPERTENSION, BENIGN: ICD-10-CM

## 2024-07-08 RX ORDER — LISINOPRIL 40 MG/1
40 TABLET ORAL DAILY
Qty: 90 TABLET | Refills: 0 | Status: SHIPPED | OUTPATIENT
Start: 2024-07-08

## 2024-07-08 NOTE — TELEPHONE ENCOUNTER
Requested Prescriptions     Pending Prescriptions Disp Refills    LISINOPRIL 40 MG Oral Tab [Pharmacy Med Name: Lisinopril Oral Tablet 40 MG] 90 tablet 0     Sig: TAKE 1 TABLET BY MOUTH DAILY     LOV 6/14/2024     Patient was asked to follow-up in: Follow-up not documented in note    Appointment scheduled: Visit date not found     Medication refilled per protocol.

## 2024-08-18 DIAGNOSIS — Z51.81 ENCOUNTER FOR THERAPEUTIC DRUG MONITORING: ICD-10-CM

## 2024-08-18 DIAGNOSIS — E66.01 OBESITY, CLASS III, BMI 40-49.9 (MORBID OBESITY) (HCC): ICD-10-CM

## 2024-08-18 DIAGNOSIS — R73.03 PREDIABETES: ICD-10-CM

## 2024-08-19 RX ORDER — METFORMIN HYDROCHLORIDE 750 MG/1
TABLET, EXTENDED RELEASE ORAL
Qty: 90 TABLET | Refills: 0 | Status: SHIPPED | OUTPATIENT
Start: 2024-08-19

## 2024-08-19 NOTE — TELEPHONE ENCOUNTER
Requested Prescriptions     Pending Prescriptions Disp Refills    METFORMIN  MG Oral Tablet 24 Hr [Pharmacy Med Name: metFORMIN HCl ER Oral Tablet Extended Release 24 Hour 750 MG] 90 tablet 0     Sig: TAKE ONE TABLET BY MOUTH DAILY WITH BREAKFAST     LOV 6/14/2024     Patient was asked to follow-up in: Follow-up not documented in note    Appointment scheduled: Visit date not found     Medication not refilled per protocol.    Diabetes Medication Protocol [id number]/18/2024 10:44 AM   Protocol Details Last A1C < 7.5 and within past 6 months    EGFRCR or GFRNAA > 50    GFR in the past 12 months    In person appointment or virtual visit in the past 6 mos or appointment in next 3 mos    Microalbumin procedure in past 12 months or taking ACE/ARB

## 2024-08-22 ENCOUNTER — HOSPITAL ENCOUNTER (OUTPATIENT)
Dept: MAMMOGRAPHY | Age: 69
Discharge: HOME | End: 2024-08-22
Attending: FAMILY MEDICINE
Payer: MEDICARE

## 2024-08-22 DIAGNOSIS — Z12.31 VISIT FOR SCREENING MAMMOGRAM: ICD-10-CM

## 2024-08-22 PROCEDURE — 77063 BREAST TOMOSYNTHESIS BI: CPT | Performed by: FAMILY MEDICINE

## 2024-08-22 PROCEDURE — 77067 SCR MAMMO BI INCL CAD: CPT | Performed by: FAMILY MEDICINE

## 2024-09-10 DIAGNOSIS — E78.2 MIXED HYPERLIPIDEMIA: ICD-10-CM

## 2024-09-13 ENCOUNTER — HOSPITAL ENCOUNTER (OUTPATIENT)
Dept: BONE DENSITY | Age: 69
Discharge: HOME | End: 2024-09-13
Attending: FAMILY MEDICINE
Payer: MEDICARE

## 2024-09-13 ENCOUNTER — TELEPHONE (OUTPATIENT)
Dept: FAMILY MEDICINE CLINIC | Facility: CLINIC | Age: 69
End: 2024-09-13

## 2024-09-13 ENCOUNTER — LAB ENCOUNTER (OUTPATIENT)
Dept: LAB | Age: 69
End: 2024-09-13
Attending: FAMILY MEDICINE
Payer: MEDICARE

## 2024-09-13 DIAGNOSIS — Z78.0 POSTMENOPAUSAL: ICD-10-CM

## 2024-09-13 DIAGNOSIS — E21.3 HYPERPARATHYROIDISM (HCC): ICD-10-CM

## 2024-09-13 DIAGNOSIS — M62.81 MUSCLE WEAKNESS (GENERALIZED): ICD-10-CM

## 2024-09-13 DIAGNOSIS — R79.89 TSH EXCESS: Primary | ICD-10-CM

## 2024-09-13 DIAGNOSIS — R73.03 PREDIABETES: ICD-10-CM

## 2024-09-13 PROCEDURE — 77080 DXA BONE DENSITY AXIAL: CPT | Performed by: FAMILY MEDICINE

## 2024-09-13 RX ORDER — ROSUVASTATIN CALCIUM 5 MG/1
5 TABLET, COATED ORAL NIGHTLY
Qty: 90 TABLET | Refills: 0 | Status: SHIPPED | OUTPATIENT
Start: 2024-09-13

## 2024-09-13 NOTE — TELEPHONE ENCOUNTER
Orders placed.   Please let me know if you have any questions.  Kay Villalpando DO 9/13/2024 4:11 PM

## 2024-09-13 NOTE — TELEPHONE ENCOUNTER
Patient call because is at [facility] location requesting a new order for Parathyroid (PTH)     AND  Also need a new code for TSA.    A Soon is possible.

## 2024-09-13 NOTE — TELEPHONE ENCOUNTER
Requested Prescriptions     Pending Prescriptions Disp Refills    ROSUVASTATIN 5 MG Oral Tab [Pharmacy Med Name: Rosuvastatin Calcium Oral Tablet 5 MG] 90 tablet 0     Sig: TAKE ONE TABLET BY MOUTH NIGHTLY     LOV 6/14/2024     Patient was asked to follow-up in: 4 months    Appointment scheduled: Visit date not found     Medication refilled per protocol.

## 2024-09-19 ENCOUNTER — LAB ENCOUNTER (OUTPATIENT)
Dept: LAB | Age: 69
End: 2024-09-19
Attending: FAMILY MEDICINE
Payer: MEDICARE

## 2024-09-19 DIAGNOSIS — E21.3 HYPERPARATHYROIDISM (HCC): ICD-10-CM

## 2024-09-19 DIAGNOSIS — E78.2 MIXED HYPERLIPIDEMIA: ICD-10-CM

## 2024-09-19 DIAGNOSIS — M62.81 MUSCLE WEAKNESS (GENERALIZED): ICD-10-CM

## 2024-09-19 DIAGNOSIS — N18.31 STAGE 3A CHRONIC KIDNEY DISEASE (HCC): ICD-10-CM

## 2024-09-19 DIAGNOSIS — R79.89 TSH EXCESS: ICD-10-CM

## 2024-09-19 LAB
ALBUMIN SERPL-MCNC: 4.6 G/DL (ref 3.2–4.8)
ALBUMIN/GLOB SERPL: 1.5 {RATIO} (ref 1–2)
ALP LIVER SERPL-CCNC: 75 U/L
ALT SERPL-CCNC: 29 U/L
ANION GAP SERPL CALC-SCNC: 8 MMOL/L (ref 0–18)
AST SERPL-CCNC: 31 U/L (ref ?–34)
BASOPHILS # BLD AUTO: 0.04 X10(3) UL (ref 0–0.2)
BASOPHILS NFR BLD AUTO: 0.9 %
BILIRUB SERPL-MCNC: 1.1 MG/DL (ref 0.2–1.1)
BUN BLD-MCNC: 15 MG/DL (ref 9–23)
CALCIUM BLD-MCNC: 10.6 MG/DL (ref 8.7–10.4)
CALCIUM BLD-MCNC: 10.6 MG/DL (ref 8.7–10.4)
CHLORIDE SERPL-SCNC: 105 MMOL/L (ref 98–112)
CHOLEST SERPL-MCNC: 181 MG/DL (ref ?–200)
CO2 SERPL-SCNC: 28 MMOL/L (ref 21–32)
CREAT BLD-MCNC: 1.17 MG/DL
CREAT BLD-MCNC: 1.23 MG/DL
EGFRCR SERPLBLD CKD-EPI 2021: 51 ML/MIN/1.73M2 (ref 60–?)
EOSINOPHIL # BLD AUTO: 0.09 X10(3) UL (ref 0–0.7)
EOSINOPHIL NFR BLD AUTO: 2.1 %
ERYTHROCYTE [DISTWIDTH] IN BLOOD BY AUTOMATED COUNT: 13.8 %
EST. AVERAGE GLUCOSE BLD GHB EST-MCNC: 123 MG/DL (ref 68–126)
FASTING PATIENT LIPID ANSWER: YES
FASTING STATUS PATIENT QL REPORTED: YES
GLOBULIN PLAS-MCNC: 3.1 G/DL (ref 2–3.5)
GLUCOSE BLD-MCNC: 112 MG/DL (ref 70–99)
HBA1C MFR BLD: 5.9 % (ref ?–5.7)
HCT VFR BLD AUTO: 43.5 %
HDLC SERPL-MCNC: 78 MG/DL (ref 40–59)
HGB BLD-MCNC: 14.3 G/DL
IMM GRANULOCYTES # BLD AUTO: 0.01 X10(3) UL (ref 0–1)
IMM GRANULOCYTES NFR BLD: 0.2 %
LDLC SERPL CALC-MCNC: 88 MG/DL (ref ?–100)
LYMPHOCYTES # BLD AUTO: 1.49 X10(3) UL (ref 1–4)
LYMPHOCYTES NFR BLD AUTO: 34.8 %
MCH RBC QN AUTO: 29.1 PG (ref 26–34)
MCHC RBC AUTO-ENTMCNC: 32.9 G/DL (ref 31–37)
MCV RBC AUTO: 88.6 FL
MONOCYTES # BLD AUTO: 0.47 X10(3) UL (ref 0.1–1)
MONOCYTES NFR BLD AUTO: 11 %
NEUTROPHILS # BLD AUTO: 2.18 X10 (3) UL (ref 1.5–7.7)
NEUTROPHILS # BLD AUTO: 2.18 X10(3) UL (ref 1.5–7.7)
NEUTROPHILS NFR BLD AUTO: 51 %
NONHDLC SERPL-MCNC: 103 MG/DL (ref ?–130)
OSMOLALITY SERPL CALC.SUM OF ELEC: 294 MOSM/KG (ref 275–295)
PHOSPHATE SERPL-MCNC: 4 MG/DL (ref 2.4–5.1)
PLATELET # BLD AUTO: 247 10(3)UL (ref 150–450)
POTASSIUM SERPL-SCNC: 3.7 MMOL/L (ref 3.5–5.1)
PROT SERPL-MCNC: 7.7 G/DL (ref 5.7–8.2)
PTH-INTACT SERPL-MCNC: 84.9 PG/ML (ref 18.5–88)
RBC # BLD AUTO: 4.91 X10(6)UL
SODIUM SERPL-SCNC: 141 MMOL/L (ref 136–145)
TRIGL SERPL-MCNC: 84 MG/DL (ref 30–149)
TSI SER-ACNC: 2.73 MIU/ML (ref 0.55–4.78)
VLDLC SERPL CALC-MCNC: 13 MG/DL (ref 0–30)
WBC # BLD AUTO: 4.3 X10(3) UL (ref 4–11)

## 2024-09-19 PROCEDURE — 82310 ASSAY OF CALCIUM: CPT

## 2024-09-19 PROCEDURE — 80053 COMPREHEN METABOLIC PANEL: CPT

## 2024-09-19 PROCEDURE — 84100 ASSAY OF PHOSPHORUS: CPT

## 2024-09-19 PROCEDURE — 85025 COMPLETE CBC W/AUTO DIFF WBC: CPT

## 2024-09-19 PROCEDURE — 82565 ASSAY OF CREATININE: CPT

## 2024-09-19 PROCEDURE — 83970 ASSAY OF PARATHORMONE: CPT

## 2024-09-19 PROCEDURE — 84443 ASSAY THYROID STIM HORMONE: CPT

## 2024-09-19 PROCEDURE — 80061 LIPID PANEL: CPT

## 2024-09-19 PROCEDURE — 36415 COLL VENOUS BLD VENIPUNCTURE: CPT

## 2024-10-04 DIAGNOSIS — I10 ESSENTIAL HYPERTENSION, BENIGN: ICD-10-CM

## 2024-10-05 NOTE — TELEPHONE ENCOUNTER
Requested Prescriptions     Pending Prescriptions Disp Refills    LISINOPRIL 40 MG Oral Tab [Pharmacy Med Name: Lisinopril Oral Tablet 40 MG] 90 tablet 0     Sig: TAKE ONE TABLET BY MOUTH ONE TIME DAILY     LOV 6/14/2024     Patient was asked to follow-up in: Follow-up not documented in note    Appointment scheduled: Visit date not found     Medication refilled per protocol.  Hypertension Medications Protocol [id number]/04/2024 10:30 AM   Protocol Details Last BP reading less than 140/90    CMP or BMP in past 12 months    In person appointment or virtual visit in the past 12 mos or appointment in next 3 mos    EGFRCR or GFRNAA > 50

## 2024-10-08 RX ORDER — LISINOPRIL 40 MG/1
40 TABLET ORAL DAILY
Qty: 90 TABLET | Refills: 0 | Status: SHIPPED | OUTPATIENT
Start: 2024-10-08

## 2024-10-18 ENCOUNTER — TELEPHONE (OUTPATIENT)
Dept: FAMILY MEDICINE CLINIC | Facility: CLINIC | Age: 69
End: 2024-10-18

## 2024-10-18 NOTE — TELEPHONE ENCOUNTER
Pt is calling she wants to know if Dr. Villalpando recommends getting the flu and Covid vaccine. Please call to advise.

## 2024-11-21 ENCOUNTER — MED REC SCAN ONLY (OUTPATIENT)
Dept: FAMILY MEDICINE CLINIC | Facility: CLINIC | Age: 69
End: 2024-11-21

## 2024-11-22 DIAGNOSIS — Z51.81 ENCOUNTER FOR THERAPEUTIC DRUG MONITORING: ICD-10-CM

## 2024-11-22 DIAGNOSIS — R73.03 PREDIABETES: ICD-10-CM

## 2024-11-22 DIAGNOSIS — E66.01 OBESITY, CLASS III, BMI 40-49.9 (MORBID OBESITY) (HCC): ICD-10-CM

## 2024-11-23 RX ORDER — METFORMIN HYDROCHLORIDE 750 MG/1
TABLET, EXTENDED RELEASE ORAL
Qty: 90 TABLET | Refills: 0 | Status: SHIPPED | OUTPATIENT
Start: 2024-11-23

## 2024-11-23 NOTE — TELEPHONE ENCOUNTER
Requested Prescriptions     Pending Prescriptions Disp Refills    metFORMIN  MG Oral Tablet 24 Hr [Pharmacy Med Name: metFORMIN HCl ER Oral Tablet Extended Release 24 Hour 750 MG] 90 tablet 0     Sig: TAKE ONE TABLET BY MOUTH ONE TIME DAILY WITH BREAKFAST     LOV 6/14/2024     Patient was asked to follow-up in: Follow-up not documented in note    Appointment scheduled: Visit date not found     Medication refilled per protocol.

## 2024-11-25 DIAGNOSIS — E66.01 OBESITY, CLASS III, BMI 40-49.9 (MORBID OBESITY) (HCC): ICD-10-CM

## 2024-11-25 DIAGNOSIS — Z51.81 ENCOUNTER FOR THERAPEUTIC DRUG MONITORING: ICD-10-CM

## 2024-11-25 DIAGNOSIS — R73.03 PREDIABETES: ICD-10-CM

## 2024-11-25 RX ORDER — METFORMIN HYDROCHLORIDE 750 MG/1
TABLET, EXTENDED RELEASE ORAL
Qty: 90 TABLET | Refills: 0 | OUTPATIENT
Start: 2024-11-25

## 2024-11-27 ENCOUNTER — TELEPHONE (OUTPATIENT)
Dept: FAMILY MEDICINE CLINIC | Facility: CLINIC | Age: 69
End: 2024-11-27

## 2024-11-27 NOTE — TELEPHONE ENCOUNTER
Pt is calling because she is going to visit her 3 month old grand daughter and wants to know if she should get any vaccines before they go

## 2024-12-02 DIAGNOSIS — E66.01 OBESITY, CLASS III, BMI 40-49.9 (MORBID OBESITY) (HCC): ICD-10-CM

## 2024-12-02 DIAGNOSIS — R73.03 PREDIABETES: ICD-10-CM

## 2024-12-02 DIAGNOSIS — Z51.81 ENCOUNTER FOR THERAPEUTIC DRUG MONITORING: ICD-10-CM

## 2024-12-02 RX ORDER — METFORMIN HYDROCHLORIDE 750 MG/1
TABLET, EXTENDED RELEASE ORAL
Qty: 90 TABLET | Refills: 0 | OUTPATIENT
Start: 2024-12-02

## 2024-12-14 NOTE — TELEPHONE ENCOUNTER
Patient rescheduled appointment with Dr. Villalpando for her pre op to 5/9/24. Surgery on 5/22/24.     Called Dr. Cayla Galaviz' office for pre op paperwork to be sent to our office. Talked to the nurse and she will send it over shortly.  
Pre op paperwork received from [facility]. Patient needs H&P and form completed please review.  Paperwork placed in triage to review.      Appt scheduled for 5/9/24 with Dr. Villalpando.   
Pt calling for right eye cataract surgery on may 22-24 with Dr Cayla Boykin [phone]  pt schedule may 6 Dr Villalpando. Need paper work.  
Received pre op paperwork pt is having Parathyroidectomy with Dr Jules on 5/17    Pt has a pre op apt on 5/9/24    Placed in triage in front of printer  
Normal

## 2024-12-18 DIAGNOSIS — E78.2 MIXED HYPERLIPIDEMIA: ICD-10-CM

## 2024-12-18 RX ORDER — ROSUVASTATIN CALCIUM 5 MG/1
5 TABLET, COATED ORAL NIGHTLY
Qty: 90 TABLET | Refills: 0 | Status: SHIPPED | OUTPATIENT
Start: 2024-12-18

## 2024-12-18 NOTE — TELEPHONE ENCOUNTER
Requested Prescriptions     Signed Prescriptions Disp Refills    ROSUVASTATIN 5 MG Oral Tab 90 tablet 0     Sig: TAKE ONE TABLET BY MOUTH NIGHTLY     Authorizing Provider: NIRAV LUIS     Ordering User: SUKUMAR HAYES      Refilled per protocol/OV notes

## 2025-01-05 DIAGNOSIS — I10 ESSENTIAL HYPERTENSION, BENIGN: ICD-10-CM

## 2025-01-06 NOTE — TELEPHONE ENCOUNTER
Requested Prescriptions     Pending Prescriptions Disp Refills    LISINOPRIL 40 MG Oral Tab [Pharmacy Med Name: Lisinopril Oral Tablet 40 MG] 90 tablet 0     Sig: TAKE ONE TABLET BY MOUTH ONE TIME DAILY     LOV 6/14/2024     Patient was asked to follow-up in: 5 months    Appointment scheduled: Visit date not found     Medication not refilled per protocol.

## 2025-01-07 RX ORDER — LISINOPRIL 40 MG/1
40 TABLET ORAL DAILY
Qty: 90 TABLET | Refills: 0 | Status: SHIPPED | OUTPATIENT
Start: 2025-01-07

## 2025-01-08 DIAGNOSIS — I10 ESSENTIAL HYPERTENSION, BENIGN: ICD-10-CM

## 2025-01-09 RX ORDER — HYDROCHLOROTHIAZIDE 25 MG/1
25 TABLET ORAL DAILY
Qty: 90 TABLET | Refills: 0 | Status: SHIPPED | OUTPATIENT
Start: 2025-01-09

## 2025-01-09 NOTE — TELEPHONE ENCOUNTER
Requested Prescriptions     Pending Prescriptions Disp Refills    HYDROCHLOROTHIAZIDE 25 MG Oral Tab [Pharmacy Med Name: hydroCHLOROthiazide Oral Tablet 25 MG] 90 tablet 0     Sig: TAKE 1 TABLET BY MOUTH DAILY     LOV 6/14/2024     Patient was asked to follow-up in: Follow-up not documented in note    Appointment scheduled: Visit date not found     Medication refilled per protocol.

## 2025-01-27 DIAGNOSIS — I10 ESSENTIAL HYPERTENSION, BENIGN: ICD-10-CM

## 2025-01-28 RX ORDER — HYDROCHLOROTHIAZIDE 25 MG/1
25 TABLET ORAL DAILY
Qty: 90 TABLET | Refills: 0 | OUTPATIENT
Start: 2025-01-28

## 2025-01-28 NOTE — TELEPHONE ENCOUNTER
The original prescription was discontinued on 5/17/2024 by Kelley Gaspar CPhT for the following reason: Error. Renewing this prescription may not be appropriate.

## 2025-02-06 ENCOUNTER — TELEPHONE (OUTPATIENT)
Dept: FAMILY MEDICINE CLINIC | Facility: CLINIC | Age: 70
End: 2025-02-06

## 2025-02-06 NOTE — TELEPHONE ENCOUNTER
Patient had a visit 2/5/25 and is confused-states that Dr. Villalpando did not have the information on hand but was told she or someone from our office would call with information re: more intensive neurological testing to be done and a phone number.    Please advise.

## 2025-02-06 NOTE — TELEPHONE ENCOUNTER
Referred to Provider Information:  Provider Address Phone   Bridget Gloria  [address] [place] [id number] [phone]        Information given to Pt

## 2025-02-13 ENCOUNTER — TELEPHONE (OUTPATIENT)
Dept: PHYSICAL THERAPY | Facility: HOSPITAL | Age: 70
End: 2025-02-13

## 2025-02-14 ENCOUNTER — OFFICE VISIT (OUTPATIENT)
Dept: NEUROLOGY | Facility: CLINIC | Age: 70
End: 2025-02-14
Payer: MEDICARE

## 2025-02-14 VITALS
RESPIRATION RATE: 18 BRPM | DIASTOLIC BLOOD PRESSURE: 82 MMHG | SYSTOLIC BLOOD PRESSURE: 128 MMHG | HEART RATE: 86 BPM | BODY MASS INDEX: 45 KG/M2 | WEIGHT: 269 LBS

## 2025-02-14 DIAGNOSIS — R41.3 MEMORY LOSS, SHORT TERM: ICD-10-CM

## 2025-02-14 DIAGNOSIS — Q03.9 CONGENITAL HYDROCEPHALUS (HCC): Primary | ICD-10-CM

## 2025-02-14 PROCEDURE — 99214 OFFICE O/P EST MOD 30 MIN: CPT | Performed by: OTHER

## 2025-02-14 PROCEDURE — 1159F MED LIST DOCD IN RCRD: CPT | Performed by: OTHER

## 2025-02-14 PROCEDURE — 3079F DIAST BP 80-89 MM HG: CPT | Performed by: OTHER

## 2025-02-14 PROCEDURE — 3074F SYST BP LT 130 MM HG: CPT | Performed by: OTHER

## 2025-02-14 NOTE — PROGRESS NOTES
HPI:    Patient ID: Karly Green is a 69 year old female.  PCP: Dr Villalpando    Neurologic Problem  The patient's primary symptoms include memory loss.   Memory Loss  The patient's primary symptoms include memory loss.         Patient is a 69 year old female who presents for congenital hydrocephalus and memory loss. Has baseline unsteady gait due to congential hydrocephalus and attention deficit since a child  States last week fell in the bath tub, hit her head, also scrape left arm and left back. No headaches or neck pain  States memory problem remains the same, states has some depression going on due to stress, her mother is in hospice. States she is using CPAP regularly and sleeping fine.      Office visit: Feb 2022    Karly is here for follow-up and to discuss the MRI brain results.  She reports since her last office visit she has noticed some improvement in her memory and cognition since she has been using CPAP machine.  MRI of the brain shows stable marked congenital hydrocephalus. No new findings. At baseline patient has mild cognitive impairment since a child and unsteady gait.    Patient is a 66-year-old female with history of hypertension, diabetes, depression, congenital hydrocephalus who presented for evaluation of memory loss.  Patient reports she has always been the person that writes down every thing and noted simple forgetfulness which she believes is getting more prominent and on daily basis.  She is still able to manage her daily activities and keeps a calendar with all important dates and events.  She is also take cares of utility bills. Her depression is well controlled. She has history of sleep apnea and not been using CPAP and does admit of feeling tired when she wakes up in the morning. No family history of dementia.  In 2017 she had CT head and MRI brain performed for headaches which incidentally showed hydrocephalus and agenesis of corpus callosum. No further headaches. Has baseline gait  problem.     HISTORY:  Past Medical History:    Anxiety state    Arthritis    Back pain    Back problem    lumbar    Colon polyp    Depression    Disorder of thyroid    PARATHYROID    Esophageal reflux    H/O total knee replacement    Heartburn    Hemorrhoids    HIGH BLOOD PRESSURE    High cholesterol    History of adenomatous polyp of colon    Impacted cerumen    Indigestion    Obesity    NAYLA (obstructive sleep apnea)    AHI 20 [address] 80%    Osteoarthrosis, unspecified whether generalized or localized, unspecified site    Other and unspecified hyperlipidemia    PONV (postoperative nausea and vomiting)    Prediabetes    Renal disorder    CKD    Sleep apnea    CPAP    Unspecified essential hypertension    Visual impairment    glasses    Wears glasses      Past Surgical History:   Procedure Laterality Date    Back surgery      Excis lumbar disk,one level      Knee replacement surgery  8/2007    right    Knee replacement surgery  8/2013    left    Other  7/11/2016    LUMBAR LAMINECTOMY 1 LEVEL    Spinal fusion  2017    L4-L5 posterolateral spinal fusion with L4-L5 decompressive lumbar laminectomy with nerve root decompression      Family History   Problem Relation Age of Onset    Cancer Sister         Breast Cancer    Breast Cancer Sister 50        Twin sister.    Diabetes Father     Heart Disease Father     Other (Other) Father     Heart Disease Mother     Other (NAYLA) Brother       Social History     Socioeconomic History    Marital status:    Occupational History    Occupation: Retired   Tobacco Use    Smoking status: Never    Smokeless tobacco: Never   Vaping Use    Vaping status: Never Used   Substance and Sexual Activity    Alcohol use: No    Drug use: No    Sexual activity: Not Currently   Other Topics Concern    Caffeine Concern Yes     Comment: tea or 1 mountain dew daily    Exercise No    Seat Belt Yes    Self-Exams No     Social Drivers of Health     Food Insecurity: No Food Insecurity (5/17/2024)     Food Insecurity     Food Insecurity: Never true   Transportation Needs: No Transportation Needs (5/17/2024)    Transportation Needs     Lack of Transportation: No   Housing Stability: Low Risk  (5/17/2024)    Housing Stability     Housing Instability: No        Review of Systems   Constitutional: Negative.    HENT: Negative.     Eyes: Negative.    Respiratory: Negative.     Cardiovascular: Negative.    Gastrointestinal: Negative.    Endocrine: Negative.    Genitourinary: Negative.    Musculoskeletal: Negative.    Allergic/Immunologic: Negative.    Neurological: Negative.         + short term memory problem   Hematological: Negative.    Psychiatric/Behavioral:  Positive for decreased concentration and memory loss.    All other systems reviewed and are negative.           Current Outpatient Medications   Medication Sig Dispense Refill    citalopram 10 MG Oral Tab Take 1 tablet (10 mg total) by mouth daily. 30 tablet 1    HYDROCHLOROTHIAZIDE 25 MG Oral Tab TAKE 1 TABLET BY MOUTH DAILY 90 tablet 0    LISINOPRIL 40 MG Oral Tab TAKE ONE TABLET BY MOUTH ONE TIME DAILY 90 tablet 0    ROSUVASTATIN 5 MG Oral Tab TAKE ONE TABLET BY MOUTH NIGHTLY 90 tablet 0    metFORMIN  MG Oral Tablet 24 Hr TAKE ONE TABLET BY MOUTH ONE TIME DAILY WITH BREAKFAST 90 tablet 0    ketorolac 0.5 % Ophthalmic Solution 2 drops.      ofloxacin 0.3 % Ophthalmic Solution Place 1 drop into both eyes 4 (four) times daily.      RESTASIS 0.05 % Ophthalmic Emulsion Place 1 drop into both eyes every 12 (twelve) hours.      pregabalin 25 MG Oral Cap Take 1 capsule (25 mg total) by mouth 2 (two) times daily. 180 capsule 0    BUPROPION  MG Oral Tablet 24 Hr TAKE ONE TABLET BY MOUTH ONE TIME DAILY 90 tablet 0    latanoprost 0.005 % Ophthalmic Solution Place 1 drop into both eyes nightly.      Magnesium-Calcium-Folic Acid 400-200-1 MG Oral Tab Take 400 mg by mouth daily.      Multiple Vitamins-Minerals (MULTIPLE VITAMINS/WOMENS OR) Take 1 tablet by  mouth daily.        Ascorbic Acid (VITAMIN C) 250 MG Oral Tab Take 1 tablet (250 mg total) by mouth daily.       Allergies:  Allergies   Allergen Reactions    Adhesive Tape OTHER (SEE COMMENTS)     Ok with paper tape    Menthol RASH    Seasonal Runny nose     PHYSICAL EXAM:   Physical Exam  Blood pressure 128/82, pulse 86, resp. rate 18, weight 269 lb (122 kg), last menstrual period 07/01/2007, not currently breastfeeding.      General Appearance: well nourished, well developed, no apparent distress.     HEENT: Mild macrocephaly and atraumatic.   Cardiovascular: Normal rate, regular rhythm and normal heart sounds.    Pulmonary/Chest: Effort normal and breath sounds normal.   Abdominal: Soft. Bowel sounds are normal.     Mental Status Exam: Patient is awake, alert and oriented to person, place and time. Speech and language is intact  MOCA- 26/30 (  1 executive function, 1 in calculations and 2 point in recall)    Cranial Nerves: II: Visual acuity: normal  II: Visual fields: normal  III: Pupils: equal, round, reactive to light  III,IV,VI: Extra Ocular Movements: intact  V: Facial sensation: intact  VII: Facial strength: intact  VIII: Hearing: intact  IX: Palate: intact  XI: Shoulder shrug: intact  XII: Tongue movement: normal    Motor Exam: Normal tone. Strength is  5 out of 5 in all extremities bilaterally.  DTR: depressed    Sensory: Sensory examination is normal to light touch and pinprick     Coordination: Finger-to-nose test intact    Gait: wide based gait     TESTS/IMAGING:     MRI brain: Jan 2022    Marked abnormalities with marked dilatation of the ventricles in volume loss of the brain parenchyma is stable.  Volume loss of the inferior aspect of the vermis along with the body of the corpus callosum is unchanged.  FLAIR abnormalities   the white matter are stable.  Overall marked parenchymal abnormalities are unchanged when compared to prior examination.  The largest region of volume loss appears to be in the  parietal lobes        MRI brain: Dec 2017      Marked abnormal dilatation of the body of the lateral ventricles bilaterally is noted. This extends to the atria of the lateral ventricles. Moderate dilatation of third ventricle and mild to moderate dilatation of fourth ventricle is noted. There is no   definite evidence of transependymal flow of CSF. The sylvian aqueduct appears to be patent. 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 This constellation of findings can be seen in unusual Dandy-Walker variants. Absence of the body of the   corpus callosum is noted. This constellation of findings has a congenital appearance. This does not represent a true Dandy-Walker malformation. There is no torcular lambdoid inversion. There is no abnormal enlargement of infratentorial compartment.     ASSESSMENT/PLAN:       ICD-10-CM    1. Congenital hydrocephalus (HCC)  Q03.9       2. Memory loss, short term  R41.3         Congenital hydrocephalus, agenesis of corpus callosum, hypoplasia of vermis  Last MRI brain in 2022 is stable and unchanged marked hydrocephalus   Has gait ataxia due to above  Recent mechanical fall,denies any head injury  Patient referred to physical therapy for gait therapy      2. Memory impairment multifactorial ( NAYLA, depression, attention deficit due to congenital hydrocephalus)  Discussed neuropsychology evaluation, patient would like to wait  Counseled on cognitive exercises, optimal NAYLA and depression management    Follow up in about 6 months          See orders and medications filed with this encounter. The patient indicates understanding of these issues and agrees with the plan.      Bridget Gloria MD  [facility]        Meds This Visit:  Requested Prescriptions      No prescriptions requested or ordered in this encounter       Imaging & Referrals:  None     ID#[id number]

## 2025-02-14 NOTE — PATIENT INSTRUCTIONS
Refill policies:    Allow 2-3 business days for refills; controlled substances may take longer.  Contact your pharmacy at least 5 days prior to running out of medication and have them send an electronic request or submit request through the “request refill” option in your [employer] account.  Refills are not addressed on weekends; covering physicians do not authorize routine medications on weekends.  No narcotics or controlled substances are refilled after noon on Fridays or by on call physicians.  By law, narcotics must be electronically prescribed.  A 30 day supply with no refills is the maximum allowed.  If your prescription is due for a refill, you may be due for a follow up appointment.  To best provide you care, patients receiving routine medications need to be seen at least once a year.  Patients receiving narcotic/controlled substance medications need to be seen at least once every 3 months.  In the event that your preferred pharmacy does not have the requested medication in stock (e.g. Backordered), it is your responsibility to find another pharmacy that has the requested medication available.  We will gladly send a new prescription to that pharmacy at your request.    Scheduling Tests:    If your physician has ordered radiology tests such as MRI or CT scans, please contact Central Scheduling at [phone] right away to schedule the test.  Once scheduled, the [facility] Centralized Referral Team will work with your insurance carrier to obtain pre-certification or prior authorization.  Depending on your insurance carrier, approval may take 3-10 days.  It is highly recommended patients assure they have received an authorization before having a test performed.  If test is done without insurance authorization, patient may be responsible for the entire amount billed.      Precertification and Prior Authorizations:  If your physician has recommended that you have a procedure or additional testing performed the [facility]  Centralized Referral Team will contact your insurance carrier to obtain pre-certification or prior authorization.    You are strongly encouraged to contact your insurance carrier to verify that your procedure/test has been approved and is a COVERED benefit.  Although the [facility] Centralized Referral Team does its due diligence, the insurance carrier gives the disclaimer that \"Although the procedure is authorized, this does not guarantee payment.\"    Ultimately the patient is responsible for payment.   Thank you for your understanding in this matter.  Paperwork Completion:  If you require FMLA or disability paperwork for your recovery, please make sure to either drop it off or have it faxed to our office at [phone]. Be sure the form has your name and date of birth on it.  The form will be faxed to our Forms Department and they will complete it for you.  There is a 25$ fee for all forms that need to be filled out.  Please be aware there is a 10-14 day turnaround time.  You will need to sign a release of information (ANIL) form if your paperwork does not come with one.  You may call the Forms Department with any questions at [phone].  Their fax number is [phone].

## 2025-02-14 NOTE — PROGRESS NOTES
Patient states she fell about a week ago in the bathtub. Patient states she hit her head. Patient denies changes in memory or speech. Patient states balance has always been bad. Denies dizziness.

## 2025-02-19 ENCOUNTER — OFFICE VISIT (OUTPATIENT)
Dept: PHYSICAL THERAPY | Facility: HOSPITAL | Age: 70
End: 2025-02-19
Attending: FAMILY MEDICINE
Payer: MEDICARE

## 2025-02-19 DIAGNOSIS — Z91.81 AT HIGH RISK FOR FALLS: Primary | ICD-10-CM

## 2025-02-19 PROCEDURE — 97163 PT EVAL HIGH COMPLEX 45 MIN: CPT

## 2025-02-20 NOTE — PROGRESS NOTES
NEUROLOGICAL EVALUATION:     Diagnosis:   At high risk for falls (Z91.81) Patient:  Karly Green (69 year old, female)        Referring Provider: Kay Villalpando  Today's Date   2/19/2025    Precautions:  Fall Risk   Date of Evaluation: 02/19/25  Next MD visit: No data recorded  Date of Surgery: No data recorded     PATIENT SUMMARY   Summary of chief complaints: impaired balance and history of fall.  History of current condition: The patient states that she has had 2 back surgeries and 2 knee surgeries, the knee surgeries were in 2007 and 2014. She states that she fell in the bathtub recently and e paramedics had to come get her up. She had no injuries, but her left elbow was scraped.  She states that she has had some other falls, but this is the biggest one. She is not sure how she fell, but she did not black out or anything. She just lost her balance and fell. She has a lot of bars in th shower and a rubber mat,  ans she uses them. She has been usign the cane for several years. Has had balance issues for a lot of her life. She has always been clumsy. This is the first fall in a long time, first this year. Neurologist said things are steady, she sees her onceevery 6 months. Balance issues not new. She reports that she does not do exercise at home.     With walking she holds on her 's arm, or holding onto friend's arm to feel more sure of herself. Pt gets scared with curbs, she will go around to get to a flat spot. She feels insecure on curbs. She has stairs at home, she uses the bannister and stairs are fine. She has a tub at home, with bars. Getting out of a chair: has to give herself a little bit of a push. She does not use the cane at home, except if she is going into the garage.    Occasional pain, not bad. Does not feel weak, but before surgery legs were weak. Not since surgery.     No n/t in legs or arms.       Pain level: current 0 /10, at best 0 /10, at worst 4 /10  Description of symptoms:  unsteadiness, imbalance   Occupation: not working.    Prior level of function: prior history of imbalance for \"a lot of her life\".  Current limitations: walking, getting in and out of the shower, negotiating curbs, getting out of a chair.  Pt goals: to get more stable and more sure of herself.  Pt denies diplopia, dysarthria, dysphasia, dizziness, drop attacks, bowel/bladder changes, saddle anesthesia, and ASHLEE LE N/T.  Past medical history was reviewed with Karly.  Significant findings include: congenital hydrocephalus per neurology notes  Imaging/Tests: brain MRI 2022 CONCLUSION:  Marked abnormalities with marked dilatation of the ventricles in volume loss of the brain parenchyma is stable.  Volume loss of the inferior aspect of the vermis along with the body of the corpus callosum is unchanged.  FLAIR abnormalities the white matter are stable.  Overall marked parenchymal abnormalities are unchanged when compared to prior examination.  The largest region of volume loss appears to be in the parietal lobes.   Karly  has a past medical history of Anxiety state, Arthritis, Back pain, Back problem, Colon polyp (01/04/2020), Depression, Disorder of thyroid, Esophageal reflux, H/O total knee replacement (06/19/2013), Heartburn, Hemorrhoids, HIGH BLOOD PRESSURE, High cholesterol, History of adenomatous polyp of colon (12/06/2021), Impacted cerumen, Indigestion, Obesity, NAYLA (obstructive sleep apnea) (PSG 10-01-15), Osteoarthrosis, unspecified whether generalized or localized, unspecified site, Other and unspecified hyperlipidemia, PONV (postoperative nausea and vomiting), Prediabetes, Renal disorder, Sleep apnea, Unspecified essential hypertension, Visual impairment, and Wears glasses.  She  has a past surgical history that includes other (7/11/2016); spinal fusion (2017); excis lumbar disk,one level; back surgery; knee replacement surgery (8/2007); and knee replacement surgery (8/2013).    ASSESSMENT  Karly presents to  physical therapy evaluation with primary c/o impaired balance and history of fall.. The results of the objective tests and measures show impaired static and dynamic balance, unsteadiness with gait and ambulation in the clinic, impaired safety with ambulation.. Functional deficits include but are not limited to walking, getting in and out of the shower, negotiating curbs, getting out of a chair.. Signs and symptoms are consistent with diagnosis of At high risk for falls (Z91.81). Pt and PT discussed evaluation findings, pathology, POC and HEP.  Pt voiced understanding and performs HEP correctly without reported pain. Skilled Physical Therapy is medically necessary to address the above impairments and reach functional goals.    OBJECTIVE:   Musculoskeletal:  Observation/Posture: Noted increased thoracic kyphosis, forward head posture.   Vitals:/88    ASHLEE ROM WNL and Strength (5/5) except below:  (* denotes performed with pain)  Myotome MMT   MMT (-/5)    R L   Shoulder Abd (C5)       Elbow Ext (C7)       Elbow Flex (C6)       Thumb Ext (C8)       Digit Flex (C8)       Interossei (T1)       Hip Flex (L2) 4+ 4+   Knee Ext (L3) 4+ 4+   Ankle DF (L4) 5 5   Ankle PF (S1) NT NT   Grt Toe Ext (L5) NT NT       Neurological:  Coordination:  Finger to Nose: NT   Pronation/Supination: NT   Toe Tap: NT     Sensation:  Light Touch: NT   Sharp: NT   Vibration: NT   Proprioception: NT      Deep Tendon Reflexes: WNL except NT   Tone: WNL except NT     Pathological Reflexes:  Babinski: NT   Clonus: NT   Garcia's Sign: NT          Balance and Functional Mobility:  Mobility / Transfers Level of Assistance   Bed Mobility NT   Supine --> Sit NT   Sit --> Supine NT   Sit --> Stand IND   Stand --> Sit IND   Chair --> Chair IND     Postural Control:  Romberg EO: level surface 30 sec; compliant surface 0 sec (NT)     Romberg EC: level surface 0 sec (NT); compliant surface 0 sec (NT)  Tandem Stance: R back: 3 sec (half tandem 25 sec); L  back:  ; Fall Risk: Yes  SLS: R: 0 sec (NT); L: 0 sec (NT); Fall Risk: Yes    Gait: pt ambulates on level ground with assistive device; decreased step length; decreased stance phase; shuffle; decreased foot clearance; stooped posture/forward lean; difficulty turning (SP cane, with use of L UE on furniture/counter top or PT support) Pt is unsteady with ambulation SP cane, requires PT assist with arm hold to maintain balance. With RW, she requires occasional cueing to stay inside walker, she has difficulty turning. She is more steady with walker compared to use of SP cane.  Timed Up and Go (AD,time): 76 sec (seconds with SP cane, 1:08 with RW); Fall Risk: Yes  Dynamic Gait Index Score: 0 /24 (NT); Fall Risk:    5x Sit -->Stand: 0 sec (NT); Fall Risk:    10 Meter Walk Test:    6 Minute Walk Test:  Distance walked:     Total time walked:     Number of rest breaks:      Today's Treatment and Response:   Pt education was provided on exam findings, treatment diagnosis, treatment plan, expectations, and prognosis.  Today's Treatment       2/19/2025   PT Treatment   Neuro Re-Ed HEP: standing with feet close together at counter top and balancing   Evaluation Min 45   Total of Timed Procedures 0   Total of Service Based 45   Total Treatment Time 45         Patient was instructed in and issued a HEP for: standing with feet close together at counter top and balancing    Charges:  PT EVAL: High Complexity,    In agreement with evaluation findings and clinical rationale, this evaluation involved HIGH COMPLEXITY decision making due to 3 or more personal factors/comorbidities, 4 or more body structures involved/activity limitations, and unstable symptoms as documented in the evaluation.                                                        PLAN OF CARE:    Goals: (to be met in 8 visits)   Goals       Therapy Goals      Not Met Progress Toward Partially Met Met   Pt will demonstrate improved tandem stance to >5 seconds ASHLEE to  promote safety and decrease risk of falls on uneven surfaces such as grass and gravel. [] [] [] []   Pt will perform TUG in <60 seconds with least restrictive AD, demonstrating improved gait speed for community ambulation. [] [] [] []   Pt will demonstrate safety when ambulating in the clinic 100% of the time [] [] [] []   Pt will be able to perform sit<>stand with little difficulty [] [] [] []   Pt will improve functional hip strength to demonstrate ability to ascend/descend 1 flight of stairs reciprocally without use of handrail. [] [] [] []   Pt will be independent and compliant with comprehensive HEP to maintain progress achieved in PT. [] [] [] []                   Frequency / Duration: Patient will be seen 1-2x/week or a total of 8 visits over a 90 day period. Treatment will include: Gait training; Manual Therapy; Neuromuscular Re-education; Self-Care Home Management; Therapeutic Activities; Therapeutic Exercise; Home Exercise Program instruction    Education or treatment limitation: Cognition   Rehab Potential: good     FES Score  Score: (Patient-Rptd) 48.44 % (2/19/2025  6:30 PM)    Score and level of concern: (Patient-Rptd) 31 - high concern (2/19/2025  6:30 PM)      Patient/Family/Caregiver was advised of these findings, precautions, and treatment options and has agreed to actively participate in planning and for this course of care.    Thank you for your referral. Please co-sign or sign and return this letter via fax as soon as possible to [phone]. If you have any questions, please contact me at Dept: [phone]    Sincerely,  Electronically signed by therapist: Nita Mcguire, PT  Physician's certification required: Yes  I certify the need for these services furnished under this plan of treatment and while under my care.    X___________________________________________________ Date____________________    Certification From: 2/19/2025  To:5/20/2025

## 2025-02-25 ENCOUNTER — OFFICE VISIT (OUTPATIENT)
Dept: PHYSICAL THERAPY | Facility: HOSPITAL | Age: 70
End: 2025-02-25
Attending: FAMILY MEDICINE
Payer: MEDICARE

## 2025-02-25 PROCEDURE — 97112 NEUROMUSCULAR REEDUCATION: CPT

## 2025-02-25 PROCEDURE — 97110 THERAPEUTIC EXERCISES: CPT

## 2025-02-25 NOTE — TELEPHONE ENCOUNTER
Requested Prescriptions     Pending Prescriptions Disp Refills    diclofenac 50 MG Oral Tab EC 60 tablet 0     Sig: Take 1 tablet (50 mg total) by mouth 2 (two) times daily as needed.        Last refill: 1/10/23    Last Appointment: LOV 2/4/2025     Next Appointment: 3/6/2025 Kay Villalpando DO    The original prescription was discontinued on 5/17/2024 by Kelley Gaspar CPhT for the following reason: Error. Renewing this prescription may not be appropriate.

## 2025-02-26 NOTE — PROGRESS NOTES
Patient: Karly Green (69 year old, female) Referring Provider:  Insurance:   Diagnosis: At high risk for falls (Z91.81) Kay LAND   Date of Surgery: No data recorded Next MD visit:  N/A   Precautions:  Fall Risk No data recorded Referral Information:    Date of Evaluation: Req: 8, Auth: 8, Exp: 5/19/2025 02/19/25 POC Auth Visits:  8       Today's Date   2/25/2025    Subjective  The patient states that she fell the day after she was here. She was visiting a friend in [facility] and she was walking on a sidewalk to go into the house. She had her cane and was not holding onto anyone's arm. She tried using the walker today at home. She has been doing the exercise that I gave her last time.       Pain: 0/10     Objective          Observation: pt has impairments in sequencing of steps with stepping over hurdles, taking large steps and changing step length to always step over jessica with R LE.          Assessment  Gave maximal education in recommendations for the patient to use a rolling walker when ambulating out in the community, including when coming to physical therapy. With straight walking the patient has good safety and stability with walking, however with turning she does tend to come outside of the walker. Gave cues to stay inside the walker for safety.    Goals (to be met in 8 visits)   Goals       Therapy Goals      Not Met Progress Toward Partially Met Met   Pt will demonstrate improved tandem stance to >5 seconds ASHLEE to promote safety and decrease risk of falls on uneven surfaces such as grass and gravel. [] [] [] []   Pt will perform TUG in <60 seconds with least restrictive AD, demonstrating improved gait speed for community ambulation. [] [] [] []   Pt will demonstrate safety when ambulating in the clinic 100% of the time [] [] [] []   Pt will be able to perform sit<>stand with little difficulty [] [] [] []   Pt will improve functional hip strength to demonstrate ability to  ascend/descend 1 flight of stairs reciprocally without use of handrail. [] [] [] []   Pt will be independent and compliant with comprehensive HEP to maintain progress achieved in PT. [] [] [] []                 Plan  continue balance training in // bars, evaluate gait with patient's walker    Treatment Last 4 Visits       2/19/2025 2/25/2025   PT Treatment   Treatment Day  2   Therapeutic Exercise  Nustep L5, 4 min  Calf stretching 3x30 sec  Harpreet step over in // bars 6x, cues for step length   Neuro Re-Ed HEP: standing with feet close together at counter top and balancing Standing balance  Ambulation with RW, around PT gym, cues for sequencing, staying inside walker, SBA for safety  Standing on airex wide ELVIS 3x30 sec  Lateral stepping in // bars no UE support, SBA for safety   Therapeutic Exercise Min  12   Neuro Re-Ed Min  30   Evaluation Min 45    Total of Timed Procedures 0 42   Total of Service Based 45 0   Total Treatment Time 45 42         HEP  Access Code: [email or web address]  URL: [email or web address]  Date: 02/26/2025  Prepared by: Nita    Exercises  - Narrow Stance with Counter Support  - 1-2 x daily - 7 x weekly - 1 sets - 3 reps - 30 sec hold    Charges     neuro re-ed x2, ther ex x1

## 2025-03-03 DIAGNOSIS — I10 ESSENTIAL HYPERTENSION, BENIGN: ICD-10-CM

## 2025-03-03 DIAGNOSIS — E66.01 OBESITY, CLASS III, BMI 40-49.9 (MORBID OBESITY) (HCC): ICD-10-CM

## 2025-03-03 DIAGNOSIS — Z51.81 ENCOUNTER FOR THERAPEUTIC DRUG MONITORING: ICD-10-CM

## 2025-03-03 DIAGNOSIS — R73.03 PREDIABETES: ICD-10-CM

## 2025-03-03 DIAGNOSIS — E78.2 MIXED HYPERLIPIDEMIA: ICD-10-CM

## 2025-03-03 NOTE — TELEPHONE ENCOUNTER
Spoke with Karly. All the requested medication have been filled at [facility] in the past. Karly says [facility] is for short term medications. Karly says she is not using Ofloxacin Eye drops, stating this medication was for Cataract surgery in the past. [facility] is for long term. Before removing Ofloxacin from her medication list, I checked with Manuelito, Karly's , Per HIPAA if she is still taking these eye drops. He wasn't sure. If she is, they should come from Ophthalmology. He verbalized understanding.    Spoke with Olivier at [facility].  Metformin ER 750mg was also requested. Metformin was last filled 2/28/25 #90. This request will be addressed at her upcoming visit 3/6/25 with .

## 2025-03-03 NOTE — TELEPHONE ENCOUNTER
Requested Prescriptions     Pending Prescriptions Disp Refills    hydroCHLOROthiazide 25 MG Oral Tab 90 tablet 0     Sig: Take 1 tablet (25 mg total) by mouth daily.    lisinopril 40 MG Oral Tab 90 tablet 0     Sig: Take 1 tablet (40 mg total) by mouth daily.    rosuvastatin 5 MG Oral Tab 90 tablet 0     Sig: Take 1 tablet (5 mg total) by mouth nightly.    metFORMIN  MG Oral Tablet 24 Hr 90 tablet 0     Sig: TAKE ONE TABLET BY MOUTH ONE TIME DAILY WITH BREAKFAST     LOV 2/4/2025     Patient was asked to follow-up in: 1 month f/u    Appointment scheduled: 3/6/2025 Kay Villalpando, DO     Medications pass protocol except for Metformin ER 750mg. Prediabetic No Microalbumin done

## 2025-03-03 NOTE — TELEPHONE ENCOUNTER
Danita from [facility] calling for a refill of the following medications.    HYDROCHLOROTHIAZIDE 25 MG Oral Tab     LISINOPRIL 40 MG Oral Tab     ROSUVASTATIN 5 MG Oral Tab     Ofloxacin 0.3% eye drops    [facility] (Now [facility]) - [place], [place] - [address] [phone], [phone]

## 2025-03-04 RX ORDER — METFORMIN HYDROCHLORIDE 750 MG/1
TABLET, EXTENDED RELEASE ORAL
Qty: 90 TABLET | Refills: 0 | Status: SHIPPED | OUTPATIENT
Start: 2025-03-04

## 2025-03-04 RX ORDER — ROSUVASTATIN CALCIUM 5 MG/1
5 TABLET, COATED ORAL NIGHTLY
Qty: 90 TABLET | Refills: 0 | Status: SHIPPED | OUTPATIENT
Start: 2025-03-04

## 2025-03-04 RX ORDER — HYDROCHLOROTHIAZIDE 25 MG/1
25 TABLET ORAL DAILY
Qty: 90 TABLET | Refills: 0 | Status: SHIPPED | OUTPATIENT
Start: 2025-03-04

## 2025-03-04 RX ORDER — LISINOPRIL 40 MG/1
40 TABLET ORAL DAILY
Qty: 90 TABLET | Refills: 0 | Status: SHIPPED | OUTPATIENT
Start: 2025-03-04

## 2025-03-06 ENCOUNTER — LAB ENCOUNTER (OUTPATIENT)
Dept: LAB | Age: 70
End: 2025-03-06
Attending: FAMILY MEDICINE
Payer: MEDICARE

## 2025-03-06 DIAGNOSIS — G31.84 MCI (MILD COGNITIVE IMPAIRMENT): ICD-10-CM

## 2025-03-06 DIAGNOSIS — E21.3 HYPERPARATHYROIDISM (HCC): ICD-10-CM

## 2025-03-06 DIAGNOSIS — I10 ESSENTIAL HYPERTENSION, BENIGN: ICD-10-CM

## 2025-03-06 DIAGNOSIS — N18.31 STAGE 3A CHRONIC KIDNEY DISEASE (HCC): Chronic | ICD-10-CM

## 2025-03-06 LAB
ALBUMIN SERPL-MCNC: 5 G/DL (ref 3.2–4.8)
ALBUMIN/GLOB SERPL: 1.9 {RATIO} (ref 1–2)
ALP LIVER SERPL-CCNC: 82 U/L
ALT SERPL-CCNC: 22 U/L
ANION GAP SERPL CALC-SCNC: 11 MMOL/L (ref 0–18)
AST SERPL-CCNC: 27 U/L (ref ?–34)
BASOPHILS # BLD AUTO: 0.03 X10(3) UL (ref 0–0.2)
BASOPHILS NFR BLD AUTO: 0.4 %
BILIRUB SERPL-MCNC: 1 MG/DL (ref 0.2–1.1)
BUN BLD-MCNC: 20 MG/DL (ref 9–23)
CALCIUM BLD-MCNC: 10.4 MG/DL (ref 8.7–10.6)
CHLORIDE SERPL-SCNC: 104 MMOL/L (ref 98–112)
CO2 SERPL-SCNC: 30 MMOL/L (ref 21–32)
CREAT BLD-MCNC: 1.14 MG/DL
EGFRCR SERPLBLD CKD-EPI 2021: 52 ML/MIN/1.73M2 (ref 60–?)
EOSINOPHIL # BLD AUTO: 0.07 X10(3) UL (ref 0–0.7)
EOSINOPHIL NFR BLD AUTO: 1 %
ERYTHROCYTE [DISTWIDTH] IN BLOOD BY AUTOMATED COUNT: 13.8 %
FASTING STATUS PATIENT QL REPORTED: NO
GLOBULIN PLAS-MCNC: 2.6 G/DL (ref 2–3.5)
GLUCOSE BLD-MCNC: 101 MG/DL (ref 70–99)
HCT VFR BLD AUTO: 45.4 %
HGB BLD-MCNC: 15 G/DL
IMM GRANULOCYTES # BLD AUTO: 0.03 X10(3) UL (ref 0–1)
IMM GRANULOCYTES NFR BLD: 0.4 %
LYMPHOCYTES # BLD AUTO: 1.68 X10(3) UL (ref 1–4)
LYMPHOCYTES NFR BLD AUTO: 23 %
MCH RBC QN AUTO: 29.7 PG (ref 26–34)
MCHC RBC AUTO-ENTMCNC: 33 G/DL (ref 31–37)
MCV RBC AUTO: 89.9 FL
MONOCYTES # BLD AUTO: 0.72 X10(3) UL (ref 0.1–1)
MONOCYTES NFR BLD AUTO: 9.8 %
NEUTROPHILS # BLD AUTO: 4.79 X10 (3) UL (ref 1.5–7.7)
NEUTROPHILS # BLD AUTO: 4.79 X10(3) UL (ref 1.5–7.7)
NEUTROPHILS NFR BLD AUTO: 65.4 %
OSMOLALITY SERPL CALC.SUM OF ELEC: 303 MOSM/KG (ref 275–295)
PLATELET # BLD AUTO: 220 10(3)UL (ref 150–450)
POTASSIUM SERPL-SCNC: 4 MMOL/L (ref 3.5–5.1)
PROT SERPL-MCNC: 7.6 G/DL (ref 5.7–8.2)
PTH-INTACT SERPL-MCNC: 82.7 PG/ML (ref 18.5–88)
RBC # BLD AUTO: 5.05 X10(6)UL
SODIUM SERPL-SCNC: 145 MMOL/L (ref 136–145)
TSI SER-ACNC: 2.47 UIU/ML (ref 0.55–4.78)
VIT B12 SERPL-MCNC: 448 PG/ML (ref 211–911)
WBC # BLD AUTO: 7.3 X10(3) UL (ref 4–11)

## 2025-03-06 PROCEDURE — 80053 COMPREHEN METABOLIC PANEL: CPT

## 2025-03-06 PROCEDURE — 82607 VITAMIN B-12: CPT

## 2025-03-06 PROCEDURE — 85025 COMPLETE CBC W/AUTO DIFF WBC: CPT

## 2025-03-06 PROCEDURE — 83970 ASSAY OF PARATHORMONE: CPT

## 2025-03-06 PROCEDURE — 36415 COLL VENOUS BLD VENIPUNCTURE: CPT

## 2025-03-06 PROCEDURE — 84443 ASSAY THYROID STIM HORMONE: CPT

## 2025-03-07 ENCOUNTER — OFFICE VISIT (OUTPATIENT)
Dept: PHYSICAL THERAPY | Facility: HOSPITAL | Age: 70
End: 2025-03-07
Attending: FAMILY MEDICINE
Payer: MEDICARE

## 2025-03-07 PROCEDURE — 97110 THERAPEUTIC EXERCISES: CPT

## 2025-03-07 PROCEDURE — 97112 NEUROMUSCULAR REEDUCATION: CPT

## 2025-03-07 NOTE — PROGRESS NOTES
Patient: Karly Green (69 year old, female) Referring Provider:  Insurance:   Diagnosis: At high risk for falls (Z91.81) Kay LAND   Date of Surgery: No data recorded Next MD visit:  N/A   Precautions:  Fall Risk No data recorded Referral Information:    Date of Evaluation: Req: 8, Auth: 8, Exp: 5/19/2025 02/19/25 POC Auth Visits:  8       Today's Date   3/7/2025    Subjective  The patient states that she has not had any falls, so she feels like she is getting a little bit better. She hasn't used the walker all of the time, but she has been using it more and getting more used to it.       Pain: 0/10     Objective  observation: pt demonstrates safety when ambulating with a rolling walker, she does have increased difficulty with turning              Assessment  Continuing to work on improving balance and lower extremity strength.The patient reported to PT with a walker. She requires occasional cueing to stay inside the walker, especially with turning. Karly had some difficulty with the lateral stepping without upper extremity support with imbalance noted, but she was able to complete this safely while holding onto the counter. Added this to her HEP.    Goals (to be met in 8 visits)   Therapy Goals     Not Met Progress Toward Partially Met Met   Pt will demonstrate improved tandem stance to >5 seconds ASHLEE to promote safety and decrease risk of falls on uneven surfaces such as grass and gravel. [] [] [] []   Pt will perform TUG in <60 seconds with least restrictive AD, demonstrating improved gait speed for community ambulation. [] [] [] []   Pt will demonstrate safety when ambulating in the clinic 100% of the time [] [] [] []   Pt will be able to perform sit<>stand with little difficulty [] [] [] []   Pt will improve functional hip strength to demonstrate ability to ascend/descend 1 flight of stairs reciprocally without use of handrail. [] [] [] []   Pt will be independent and compliant with  comprehensive HEP to maintain progress achieved in PT. [] [] [] []             Plan  stepping over theraband on floor    Treatment Last 4 Visits       2/19/2025 2/25/2025 3/7/2025   PT Treatment   Treatment Day  2 3   Therapeutic Exercise  Nustep L5, 4 min  Calf stretching 3x30 sec  Harpreet step over in // bars 6x, cues for step length    Neuro Re-Ed HEP: standing with feet close together at counter top and balancing Standing balance  Ambulation with RW, around PT gym, cues for sequencing, staying inside walker, SBA for safety  Standing on airex wide ELVIS 3x30 sec  Lateral stepping in // bars no UE support, SBA for safety    Therapeutic Exercise Min  12    Neuro Re-Ed Min  30    Evaluation Min 45     Total of Timed Procedures 0 42    Total of Service Based 45 0    Total Treatment Time 45 42           2/25/2025 3/7/2025   LE Treatment   Treatment Day 2 3   Therapeutic Exercise  Nustep L5, 5 min   Calf stretching 3x30 sec   Harpreet step over forward in // bars 6x, cues for step length   Standing 3 way hip yellow TB 2x5   Neuro Re-Education  Standing balance, feet together  Lateral stepping in // bars with UE support 2x, without and SBA 1x  Standing on airex wide ELVIS 3x30 sec        Therapeutic Exercise Minutes  32   Neuro Re-Educ Minutes  10   Total Time Of Timed Procedures  42   Total Time Of Service-Based Procedures  0   Total Treatment Time  42   HEP  Access Code: [email or web address]  URL: [email or web address]  Date: 03/07/2025  Prepared by: Nita    Exercises  - Narrow Stance with Counter Support  - 1-2 x daily - 7 x weekly - 1 sets - 3 reps - 30 sec hold  - Side Stepping with Counter Support  - 1 x daily - 7 x weekly - 1 sets - 5 reps        HEP  Access Code: [email or web address]  URL: [email or web address]  Date: 03/07/2025  Prepared by: Nita    Exercises  - Narrow Stance with Counter Support  - 1-2 x daily - 7 x weekly - 1 sets - 3 reps - 30 sec hold  - Side Stepping with Counter Support  - 1 x daily - 7 x weekly - 1  sets - 5 reps    Charges     neuro re-ed x1, ther ex x2

## 2025-03-11 ENCOUNTER — OFFICE VISIT (OUTPATIENT)
Dept: PHYSICAL THERAPY | Facility: HOSPITAL | Age: 70
End: 2025-03-11
Attending: FAMILY MEDICINE
Payer: MEDICARE

## 2025-03-11 PROCEDURE — 97110 THERAPEUTIC EXERCISES: CPT

## 2025-03-11 PROCEDURE — 97112 NEUROMUSCULAR REEDUCATION: CPT

## 2025-03-11 NOTE — PROGRESS NOTES
Patient: Karly Green (69 year old, female) Referring Provider:  Insurance:   Diagnosis: At high risk for falls (Z91.81) Kay LAND   Date of Surgery: No data recorded Next MD visit:  N/A   Precautions:  Fall Risk No data recorded Referral Information:    Date of Evaluation: Req: 8, Auth: 8, Exp: 5/19/2025 02/19/25 POC Auth Visits:  8       Today's Date   3/11/2025    Subjective  The patient states that she has not had any falls. She has been using the walker. She gets tired after PT. She has been walking around the store with her  to get out and move. She felt safe doing the new exercise at home, she did it a little bit but not as much as she should have.       Pain: 0/10     Objective  Observation: pt is able to maintain balance with feet slightly apart, together with head turns            Assessment  Continuing to focus on balance and on improving ability to perform weight shifting while maintainin balance. The patient demonstrated improved safety with use of the rolling walker today. She does tend to get a bit outside of the walker when making turns, therefore provided feedback to improve safety with turning.    Goals (to be met in 8 visits)   Therapy Goals     Not Met Progress Toward Partially Met Met   Pt will demonstrate improved tandem stance to >5 seconds ASHLEE to promote safety and decrease risk of falls on uneven surfaces such as grass and gravel. [] [] [] []   Pt will perform TUG in <60 seconds with least restrictive AD, demonstrating improved gait speed for community ambulation. [] [] [] []   Pt will demonstrate safety when ambulating in the clinic 100% of the time [] [] [] []   Pt will be able to perform sit<>stand with little difficulty [] [] [] []   Pt will improve functional hip strength to demonstrate ability to ascend/descend 1 flight of stairs reciprocally without use of handrail. [] [] [] []   Pt will be independent and compliant with comprehensive HEP to maintain  progress achieved in PT. [] [] [] []                 Plan  continue haed turns, trunk rotation with balance    Treatment Last 4 Visits       2/19/2025 2/25/2025 3/7/2025 3/11/2025   PT Treatment   Treatment Day  2 3 4   Therapeutic Exercise  Nustep L5, 4 min  Calf stretching 3x30 sec  Harpreet step over in // bars 6x, cues for step length     Neuro Re-Ed HEP: standing with feet close together at counter top and balancing Standing balance  Ambulation with RW, around PT gym, cues for sequencing, staying inside walker, SBA for safety  Standing on airex wide ELVIS 3x30 sec  Lateral stepping in // bars no UE support, SBA for safety     Therapeutic Exercise Min  12     Neuro Re-Ed Min  30     Evaluation Min 45      Total of Timed Procedures 0 42     Total of Service Based 45 0     Total Treatment Time 45 42            2/25/2025 3/7/2025 3/11/2025   Neuro Treatment   Treatment Day 2 3 4   Therapeutic Exercise   Nustep 5 min L6  Lateral stepping in // bars 3x down/back  LAQ red TB 2x10 ea  Standing hip abduction red TB 2x10 ea     Neuro Re-Education   Standing feet shoulder width apart head turns vertical, horizontal 2x10 ea  Standing with trunk rotation tapping ball on parallel bars, R/L in normal ELVIS, narrow ELVIS, feet together 1 min ea  Stepping/tapping over theraband on floor single UE support 2x10 ea     Therapeutic Exercise Minutes   13   Neuro Re-Educ Minutes   30   Total Time Of Timed Procedures   43   Total Time Of Service-Based Procedures   0   Total Treatment Time   43   HEP   Access Code: [email or web address]  URL: [email or web address]  Date: 03/11/2025  Prepared by: Nita    Exercises  - Narrow Stance with Counter Support  - 1-2 x daily - 7 x weekly - 1 sets - 3 reps - 30 sec hold  - Side Stepping with Counter Support  - 1 x daily - 7 x weekly - 1 sets - 5 reps  - Seated Knee Extension with Resistance  - 1 x daily - 7 x weekly - 3 sets - 8 reps        HEP  Access Code: [email or web address]  URL: [email or web address]  Date:  03/11/2025  Prepared by: Nita    Exercises  - Narrow Stance with Counter Support  - 1-2 x daily - 7 x weekly - 1 sets - 3 reps - 30 sec hold  - Side Stepping with Counter Support  - 1 x daily - 7 x weekly - 1 sets - 5 reps  - Seated Knee Extension with Resistance  - 1 x daily - 7 x weekly - 3 sets - 8 reps    Charges  ther ex x1, neuro re-ed x2

## 2025-03-14 ENCOUNTER — OFFICE VISIT (OUTPATIENT)
Dept: PHYSICAL THERAPY | Facility: HOSPITAL | Age: 70
End: 2025-03-14
Attending: FAMILY MEDICINE
Payer: MEDICARE

## 2025-03-14 PROCEDURE — 97110 THERAPEUTIC EXERCISES: CPT

## 2025-03-14 PROCEDURE — 97112 NEUROMUSCULAR REEDUCATION: CPT

## 2025-03-18 ENCOUNTER — APPOINTMENT (OUTPATIENT)
Dept: PHYSICAL THERAPY | Facility: HOSPITAL | Age: 70
End: 2025-03-18
Attending: FAMILY MEDICINE
Payer: MEDICARE

## 2025-03-21 ENCOUNTER — OFFICE VISIT (OUTPATIENT)
Dept: PHYSICAL THERAPY | Facility: HOSPITAL | Age: 70
End: 2025-03-21
Attending: FAMILY MEDICINE
Payer: MEDICARE

## 2025-03-21 PROCEDURE — 97112 NEUROMUSCULAR REEDUCATION: CPT

## 2025-03-21 PROCEDURE — 97110 THERAPEUTIC EXERCISES: CPT

## 2025-03-22 NOTE — PROGRESS NOTES
Patient: Karly Green (69 year old, female) Referring Provider:  Insurance:   Diagnosis: At high risk for falls (Z91.81) Kay Irasema MACHUCATAYLOR EMERY   Date of Surgery: No data recorded Next MD visit:  N/A   Precautions:  Fall Risk No data recorded Referral Information:    Date of Evaluation: Req: 8, Auth: 8, Exp: 5/19/2025 02/19/25 POC Auth Visits:  8       Today's Date   3/21/2025    Subjective  The patient states that the therapy is helping. She states that she has not fallen, but she still has a fear of falling.       Pain: 0/10     Objective  observation: the pt occasionally catches toe on airex pad when performing airex step ups            Assessment  Noted improvements with safety wiht ambulation. The patient is able to ambulate with the rolling walker with no evidence of imbalance. Progressed exercises today to perform the lateral stepping on an airex  beam. Added a sit to stand exercise for home.    Goals (to be met in 8 visits)   Therapy Goals     Not Met Progress Toward Partially Met Met   Pt will demonstrate improved tandem stance to >5 seconds ASHLEE to promote safety and decrease risk of falls on uneven surfaces such as grass and gravel. [] [] [] []   Pt will perform TUG in <60 seconds with least restrictive AD, demonstrating improved gait speed for community ambulation. [] [] [] []   Pt will demonstrate safety when ambulating in the clinic 100% of the time [] [] [] []   Pt will be able to perform sit<>stand with little difficulty [] [] [] []   Pt will improve functional hip strength to demonstrate ability to ascend/descend 1 flight of stairs reciprocally without use of handrail. [] [] [] []   Pt will be independent and compliant with comprehensive HEP to maintain progress achieved in PT. [] [] [] []                         Plan  reassessment for auth    Treatment Last 4 Visits       3/7/2025 3/11/2025 3/14/2025 3/21/2025   PT Treatment   Treatment Day 3 4 5 6          3/7/2025 3/11/2025 3/14/2025  3/21/2025   Neuro Treatment   Treatment Day 3 4 5 6   Therapeutic Exercise  Nustep 5 min L6  Lateral stepping in // bars 3x down/back  LAQ red TB 2x10 ea  Standing hip abduction red TB 2x10 ea   Nustep L7, 4 min subjective  Standing 3 way hip 2x8 yellow TB  Calf stretching 3x30 sec  Nustep 4 min  Sit<>stand from standard chair, no UE support 3x5   Neuro Re-Education  Standing feet shoulder width apart head turns vertical, horizontal 2x10 ea  Standing with trunk rotation tapping ball on parallel bars, R/L in normal EVLIS, narrow ELVIS, feet together 1 min ea  Stepping/tapping over theraband on floor single UE support 2x10 ea   Standing on airex with card placement for dual tasking x10 min  Lateral stepping no UE support in // bars 3x down/back  Standing feet together head turns with naming objects in room, naming states 4x1 min  Ambulation in PT gym with rolling walker, cues during turning Toe tap on 4\" box 2x10 ea  Cone tap 2 arm support 10x, single UE support 10x  Step up on airex pad single UE support, 10x ea  Lateral stepping on airex beam 2x down/back with UE support     Therapeutic Exercise Minutes  13 10 10   Neuro Re-Educ Minutes  30 30 32   Total Time Of Timed Procedures  43 40 42   Total Time Of Service-Based Procedures  0 0 0   Total Treatment Time  43 40 42   HEP  Access Code: [email or web address]  URL: [email or web address]  Date: 03/11/2025  Prepared by: Nita    Exercises  - Narrow Stance with Counter Support  - 1-2 x daily - 7 x weekly - 1 sets - 3 reps - 30 sec hold  - Side Stepping with Counter Support  - 1 x daily - 7 x weekly - 1 sets - 5 reps  - Seated Knee Extension with Resistance  - 1 x daily - 7 x weekly - 3 sets - 8 reps  Access Code: [email or web address]  URL: [email or web address]  Date: 03/21/2025  Prepared by: Nita    Exercises  - Narrow Stance with Counter Support  - 1-2 x daily - 7 x weekly - 1 sets - 3 reps - 30 sec hold  - Side Stepping with Counter Support  - 1 x daily - 7 x weekly - 1 sets - 5  reps  - Seated Knee Extension with Resistance  - 1 x daily - 7 x weekly - 3 sets - 8 reps  - Sit to Stand Without Arm Support  - 1 x daily - 7 x weekly - 3 sets - 5 reps        HEP  Access Code: [email or web address]  URL: [email or web address]  Date: 03/21/2025  Prepared by: Nita    Exercises  - Narrow Stance with Counter Support  - 1-2 x daily - 7 x weekly - 1 sets - 3 reps - 30 sec hold  - Side Stepping with Counter Support  - 1 x daily - 7 x weekly - 1 sets - 5 reps  - Seated Knee Extension with Resistance  - 1 x daily - 7 x weekly - 3 sets - 8 reps  - Sit to Stand Without Arm Support  - 1 x daily - 7 x weekly - 3 sets - 5 reps    Charges  ther ex x1, neuro re-ed x2

## 2025-03-24 DIAGNOSIS — E78.2 MIXED HYPERLIPIDEMIA: ICD-10-CM

## 2025-03-24 RX ORDER — ROSUVASTATIN CALCIUM 5 MG/1
5 TABLET, COATED ORAL NIGHTLY
Qty: 90 TABLET | Refills: 0 | Status: SHIPPED | OUTPATIENT
Start: 2025-03-24

## 2025-03-24 NOTE — TELEPHONE ENCOUNTER
Requested Prescriptions     Pending Prescriptions Disp Refills    ROSUVASTATIN 5 MG Oral Tab [Pharmacy Med Name: Rosuvastatin Calcium Oral Tablet 5 MG] 90 tablet 0     Sig: TAKE ONE TABLET BY MOUTH NIGHTLY     LOV 3/6/2025     Patient was asked to follow-up in: 3 months    Appointment scheduled: 6/6/2025 Kay Villalpando, DO     Medication refilled per protocol.

## 2025-03-27 ENCOUNTER — PATIENT MESSAGE (OUTPATIENT)
Dept: FAMILY MEDICINE CLINIC | Facility: CLINIC | Age: 70
End: 2025-03-27

## 2025-03-27 ENCOUNTER — TELEPHONE (OUTPATIENT)
Dept: FAMILY MEDICINE CLINIC | Facility: CLINIC | Age: 70
End: 2025-03-27

## 2025-03-27 DIAGNOSIS — R53.81 PHYSICAL DECONDITIONING: ICD-10-CM

## 2025-03-27 DIAGNOSIS — G31.84 MILD COGNITIVE IMPAIRMENT: ICD-10-CM

## 2025-03-27 DIAGNOSIS — Z91.81 PERSONAL HISTORY OF FALL: Primary | ICD-10-CM

## 2025-03-27 NOTE — TELEPHONE ENCOUNTER
Cora,    Per PT:  I am seeing Karly in physical therapy for balance treatment. I recommended that she starting using a walker for ambulation to improve safety with mobility, and she has been using her mom's walker from the past. This walker is too short (and is on the tallest setting). I wanted to reach out to see if you would be willing to order a rolling walker for Karly that is a standard height. Please feel free to reach out if you have any questions.     Can we order a rolling walker for her? Thanks!    Kay Villalpando, DO

## 2025-03-27 NOTE — TELEPHONE ENCOUNTER
Dr. Villalpando you saw patient on 3/6/25 could you update your notes to state patient requires rollator walker to prevent falls.  Thank you.

## 2025-04-01 ENCOUNTER — OFFICE VISIT (OUTPATIENT)
Dept: PHYSICAL THERAPY | Facility: HOSPITAL | Age: 70
End: 2025-04-01
Attending: FAMILY MEDICINE
Payer: MEDICARE

## 2025-04-01 PROCEDURE — 97110 THERAPEUTIC EXERCISES: CPT

## 2025-04-01 PROCEDURE — 97112 NEUROMUSCULAR REEDUCATION: CPT

## 2025-04-01 NOTE — PROGRESS NOTES
Patient: Karly Green (69 year old, female) Referring Provider:  Insurance:   Diagnosis: At high risk for falls (Z91.81) Kay LAND   Date of Surgery: No data recorded Next MD visit:  N/A   Precautions:  Fall Risk No data recorded Referral Information:    Date of Evaluation: Req: 8, Auth: 8, Exp: 5/19/2025 02/19/25 POC Auth Visits:  8      Progress Summary  Pt has attended 7 visits in Physical Therapy.     Today's Date   4/1/2025    Subjective  The patient reports that she fell 2 days ago. There was dry cat food on the floor and she was walking in the kitchen and she hit her head. She states that she was able to get up on her own using a kitchen chair. She did not have any changes in her balance or memory, no headache, no dizziness, everything has been the same. She states that she is traveling to [place] and she usually is in a wheelchair in the airport. She is visiting her son in [place] and she leaves tomorrow.       Pain: 0/10     Objective         TUG 41 sec with RW.  Initial eval: 76 sec (seconds with SP cane, 1:08 with RW); Fall Risk: Yes    Postural Control:  Romberg EO: level surface 30 sec; compliant surface 30 sec  Romberg EC: level surface 0 sec (NT); compliant surface 0 sec (NT)  Tandem Stance: R back: 27 L in back, R in back: 6 sec)  (was 3 sec) (half tandem 30 sec (was 25 sec)); L back:  ; Fall Risk: Yes  SLS: R: 0 sec (NT); L: 0 sec (NT); Fall Risk: Yes    Hip       2/19/2025 4/1/2025   Hip ROM/MMT   Rt Hip Flexion MMT (L2) 4+ 4+   Lt Hip Flexion MMT (L2) 4+ 4+   , Knee       2/19/2025 4/1/2025   Knee ROM/MMT   Rt Knee Extension MMT (L3) 4+ 5   Lt Knee Extension MMT (L3) 4+ 5   , Ankle/Foot       2/19/2025 4/1/2025   Ankle/Foot ROM/MMT   Rt Foot/Ank Pf MMT (S1) NT    Lt Foot/Ank Pf MMT (S1) NT    Rt Foot/Ank Df MMT (L4) 5 5   Lt Foot/Ank Df MMT (L4) 5 5   Rt Great Toe Extension MMT (L5) NT    Lt Great Toe Extension MMT (L5) NT           Assessment  Performed a reassessment  today to determine future plan of care for physical therapy. Have contacted the patient's PCP regarding getting an order for a rolling walker that is the correct height. The patient has reported improvements in her balance and confidence with physical therapy interventions. Noted improvements with static and dynamic balance testing. Karly does have some difficulty and she demonstrates unsteadiness with turning. Will continue to practice this to improve her safety with negotiating objects.    Goals (to be met in 14 visits)   Therapy Goals     Not Met Progress Toward Partially Met Met   Pt will demonstrate improved tandem stance to >5 seconds ASHLEE to promote safety and decrease risk of falls on uneven surfaces such as grass and gravel. [] [] [x] []   Pt will perform TUG in <60 seconds with least restrictive AD, demonstrating improved gait speed for community ambulation.  UPDATE: Pt will perform TUG with least restrictive AD in <35 seconds [] [] [x] [x]   Pt will demonstrate safety when ambulating in the clinic 100% of the time [] [x] [] []   Pt will be able to perform sit<>stand with little difficulty [] [x] [] []   Pt will improve functional hip strength to demonstrate ability to ascend/descend 1 flight of stairs reciprocally with use of handrail. [] [x] [] []   Pt will be independent and compliant with comprehensive HEP to maintain progress achieved in PT. [] [x] [] []                Plan: Continue skilled Physical Therapy 1 x/week or a total of 6 visits over a 90 day period. Treatment will include: manual therapy, therapeutic exercise, neuromuscular re-education, therapeutic activities, gait training       Patient/Family/Caregiver was advised of these findings, precautions, and treatment options and has agreed to actively participate in planning and for this course of care.    Thank you for your referral. If you have any questions, please contact me at Dept: [phone].    Sincerely,  Electronically signed by  therapist: Nita Mcguire, PT     Certification From: 4/1/2025  To:6/30/2025      Plan  continue figure 8 around cones    Treatment Last 4 Visits  Treatment Day: 7       3/11/2025 3/14/2025 3/21/2025 4/1/2025   Neuro Treatment   Therapeutic Exercise Nustep 5 min L6  Lateral stepping in // bars 3x down/back  LAQ red TB 2x10 ea  Standing hip abduction red TB 2x10 ea   Nustep L7, 4 min subjective  Standing 3 way hip 2x8 yellow TB  Calf stretching 3x30 sec  Nustep 4 min  Sit<>stand from standard chair, no UE support 3x5 Nustep, subjective  Reassessment objective findings  Walking with rollator, figure 8 around cones   Neuro Re-Education Standing feet shoulder width apart head turns vertical, horizontal 2x10 ea  Standing with trunk rotation tapping ball on parallel bars, R/L in normal ELVIS, narrow ELVIS, feet together 1 min ea  Stepping/tapping over theraband on floor single UE support 2x10 ea   Standing on airex with card placement for dual tasking x10 min  Lateral stepping no UE support in // bars 3x down/back  Standing feet together head turns with naming objects in room, naming states 4x1 min  Ambulation in PT gym with rolling walker, cues during turning Toe tap on 4\" box 2x10 ea  Cone tap 2 arm support 10x, single UE support 10x  Step up on airex pad single UE support, 10x ea  Lateral stepping on airex beam 2x down/back with UE support   Step up on sand dune  10x ea  Standing balance on sand dune 3x30 sec  Walking forward in // bars, stepping over jessica, 1/2 FR on floor with single UE support  Tilt board A-P with UE support  Assessment static balance   Therapeutic Exercise Minutes 13 10 10 15   Neuro Re-Educ Minutes 30 30 32 29   Total Time Of Timed Procedures 43 40 42 44   Total Time Of Service-Based Procedures 0 0 0 0   Total Treatment Time 43 40 42 44   HEP Access Code: [email or web address]  URL: [email or web address]  Date: 03/11/2025  Prepared by: Nita    Exercises  - Narrow Stance with Counter Support  - 1-2 x daily -  7 x weekly - 1 sets - 3 reps - 30 sec hold  - Side Stepping with Counter Support  - 1 x daily - 7 x weekly - 1 sets - 5 reps  - Seated Knee Extension with Resistance  - 1 x daily - 7 x weekly - 3 sets - 8 reps  Access Code: [email or web address]  URL: [email or web address]  Date: 03/21/2025  Prepared by: Nita    Exercises  - Narrow Stance with Counter Support  - 1-2 x daily - 7 x weekly - 1 sets - 3 reps - 30 sec hold  - Side Stepping with Counter Support  - 1 x daily - 7 x weekly - 1 sets - 5 reps  - Seated Knee Extension with Resistance  - 1 x daily - 7 x weekly - 3 sets - 8 reps  - Sit to Stand Without Arm Support  - 1 x daily - 7 x weekly - 3 sets - 5 reps         HEP  Access Code: [email or web address]  URL: [email or web address]  Date: 03/21/2025  Prepared by: Nita    Exercises  - Narrow Stance with Counter Support  - 1-2 x daily - 7 x weekly - 1 sets - 3 reps - 30 sec hold  - Side Stepping with Counter Support  - 1 x daily - 7 x weekly - 1 sets - 5 reps  - Seated Knee Extension with Resistance  - 1 x daily - 7 x weekly - 3 sets - 8 reps  - Sit to Stand Without Arm Support  - 1 x daily - 7 x weekly - 3 sets - 5 reps    Charges  ther ex x1, neuro re-ed x2

## 2025-04-01 NOTE — TELEPHONE ENCOUNTER
Referral request for rollator walker    [facility]  Fax number: [phone]    Rollator walker      Z91.81  R53.81  G31.84    Height 5'5  Weight 268    Note patient has been using a friend's walker which is not suitable in size for her.  Notes attached.

## 2025-04-02 NOTE — TELEPHONE ENCOUNTER
Lyndsay with [employer] is calling she said they need original order signed and dated by provider and criteria listed in face to face notes. She is sending another cover letter. Please fax to [phone].

## 2025-04-03 NOTE — TELEPHONE ENCOUNTER
Dr. Villalpando, I am sorry but insurance is asking for special wording to be added to the notes from 3/6/25.  You can just copy and paste please.    The beneficiary has a mobility limitation that significantly impairs her ability to participate in one or more mobility related activities of daily living in the home.  A mobility limitation is one that prevents the beneficiary from accomplishing the MRADL entirely, or places the beneficiary at reasonably determined heightened risk of morbidity or mortality secondary to the attempts to perform the MRADL or prevents the beneficiary from completing the MRADL within a reasonable time frame.    The beneficiary can safely use the walker.  The functional mobility deficit can be sufficiently resolved with the use of a walker.    Thank you.

## 2025-04-05 DIAGNOSIS — I10 ESSENTIAL HYPERTENSION, BENIGN: ICD-10-CM

## 2025-04-06 RX ORDER — LISINOPRIL 40 MG/1
40 TABLET ORAL DAILY
Qty: 90 TABLET | Refills: 0 | Status: SHIPPED | OUTPATIENT
Start: 2025-04-06

## 2025-04-06 NOTE — TELEPHONE ENCOUNTER
Requested Prescriptions     Signed Prescriptions Disp Refills    LISINOPRIL 40 MG Oral Tab 90 tablet 0     Sig: TAKE ONE TABLET BY MOUTH ONE TIME DAILY     Authorizing Provider: NIRAV LUIS     Ordering User: SUKUMAR HAYES      Refilled per protocol/OV notes

## 2025-04-09 ENCOUNTER — APPOINTMENT (OUTPATIENT)
Dept: PHYSICAL THERAPY | Facility: HOSPITAL | Age: 70
End: 2025-04-09
Attending: FAMILY MEDICINE
Payer: MEDICARE

## 2025-04-11 ENCOUNTER — OFFICE VISIT (OUTPATIENT)
Dept: PHYSICAL THERAPY | Facility: HOSPITAL | Age: 70
End: 2025-04-11
Attending: FAMILY MEDICINE
Payer: MEDICARE

## 2025-04-11 PROCEDURE — 97116 GAIT TRAINING THERAPY: CPT

## 2025-04-11 PROCEDURE — 97112 NEUROMUSCULAR REEDUCATION: CPT

## 2025-04-12 DIAGNOSIS — I10 ESSENTIAL HYPERTENSION, BENIGN: ICD-10-CM

## 2025-04-13 NOTE — PROGRESS NOTES
Patient: Karly Green (69 year old, female) Referring Provider:  Insurance:   Diagnosis: At high risk for falls (Z91.81) aKy LAND   Date of Surgery: No data recorded Next MD visit:  N/A   Precautions:  Fall Risk No data recorded Referral Information:    Date of Evaluation: Req: 14, Auth: 14, Exp: 6/3/2025    02/19/25 POC Auth Visits:  8       Today's Date   4/11/2025    Subjective  The patient states that she visited her son in [place] and she had no falls, her balance was good. She states that she had her cane with her in [place], and not the walker. She is not using an assistive device at home.       Pain: pain not reported     Objective               Assessment  Practiced negotiating obstacles with reduced/no upper extremity support to improve safety with community ambulation. The patient had some difficulty maintaing balance, with occasional upper extremity support required to maintain balance when stepping over objects with no upper extremity support.    Goals (to be met in 14 visits)     Therapy Goals     Not Met Progress Toward Partially Met Met   Pt will demonstrate improved tandem stance to >5 seconds ASHLEE to promote safety and decrease risk of falls on uneven surfaces such as grass and gravel. [] [] [x] []   Pt will perform TUG in <60 seconds with least restrictive AD, demonstrating improved gait speed for community ambulation.  UPDATE: Pt will perform TUG with least restrictive AD in <35 seconds [] [] [x] [x]   Pt will demonstrate safety when ambulating in the clinic 100% of the time [] [x] [] []   Pt will be able to perform sit<>stand with little difficulty [] [x] [] []   Pt will improve functional hip strength to demonstrate ability to ascend/descend 1 flight of stairs reciprocally with use of handrail. [] [x] [] []   Pt will be independent and compliant with comprehensive HEP to maintain progress achieved in PT. [] [x] [] []                   Plan  continue stepping over  theraband on floor    Treatment Last 4 Visits  Treatment Day: 8       3/14/2025 3/21/2025 4/1/2025 4/11/2025   Neuro Treatment   Therapeutic Exercise Nustep L7, 4 min subjective  Standing 3 way hip 2x8 yellow TB  Calf stretching 3x30 sec  Nustep 4 min  Sit<>stand from standard chair, no UE support 3x5 Nustep, subjective  Reassessment objective findings  Walking with rollator, figure 8 around cones    Neuro Re-Education Standing on airex with card placement for dual tasking x10 min  Lateral stepping no UE support in // bars 3x down/back  Standing feet together head turns with naming objects in room, naming states 4x1 min  Ambulation in PT gym with rolling walker, cues during turning Toe tap on 4\" box 2x10 ea  Cone tap 2 arm support 10x, single UE support 10x  Step up on airex pad single UE support, 10x ea  Lateral stepping on airex beam 2x down/back with UE support   Step up on sand dune  10x ea  Standing balance on sand dune 3x30 sec  Walking forward in // bars, stepping over jessica, 1/2 FR on floor with single UE support  Tilt board A-P with UE support  Assessment static balance Step up airex 10x ea,  Balance airex 3x30 sec  Forward walking in // bars with no UE support  Forward step over theraband on floor 2x5 ea  Forward step arms out no UE support 3x5 ea   Gait Training    Forward walking stepping over hurdles to for negotiating obstacles  [employer] 6\" box 2x6 ea   Therapeutic Exercise Minutes 10 10 15    Neuro Re-Educ Minutes 30 32 29 32   Gait Training Minutes    13   Total Time Of Timed Procedures 40 42 44 45   Total Time Of Service-Based Procedures 0 0 0 0   Total Treatment Time 40 42 44 45   HEP  Access Code: [email or web address]  URL: [email or web address]  Date: 03/21/2025  Prepared by: Nita    Exercises  - Narrow Stance with Counter Support  - 1-2 x daily - 7 x weekly - 1 sets - 3 reps - 30 sec hold  - Side Stepping with Counter Support  - 1 x daily - 7 x weekly - 1 sets - 5 reps  - Seated Knee Extension with  Resistance  - 1 x daily - 7 x weekly - 3 sets - 8 reps  - Sit to Stand Without Arm Support  - 1 x daily - 7 x weekly - 3 sets - 5 reps          HEP  Access Code: [email or web address]  URL: [email or web address]  Date: 03/21/2025  Prepared by: Nita    Exercises  - Narrow Stance with Counter Support  - 1-2 x daily - 7 x weekly - 1 sets - 3 reps - 30 sec hold  - Side Stepping with Counter Support  - 1 x daily - 7 x weekly - 1 sets - 5 reps  - Seated Knee Extension with Resistance  - 1 x daily - 7 x weekly - 3 sets - 8 reps  - Sit to Stand Without Arm Support  - 1 x daily - 7 x weekly - 3 sets - 5 reps    Charges  gait training x1, neuro re-ed x2

## 2025-04-14 RX ORDER — HYDROCHLOROTHIAZIDE 25 MG/1
25 TABLET ORAL DAILY
Qty: 90 TABLET | Refills: 0 | Status: SHIPPED | OUTPATIENT
Start: 2025-04-14

## 2025-04-14 NOTE — TELEPHONE ENCOUNTER
Requested Prescriptions     Signed Prescriptions Disp Refills    HYDROCHLOROTHIAZIDE 25 MG Oral Tab 90 tablet 0     Sig: TAKE ONE TABLET BY MOUTH ONE TIME DAILY     Authorizing Provider: NIRAV LUIS     Ordering User: VERONICA ADAMES      Refboogie per protocol/OV notes

## 2025-04-16 ENCOUNTER — OFFICE VISIT (OUTPATIENT)
Dept: PHYSICAL THERAPY | Facility: HOSPITAL | Age: 70
End: 2025-04-16
Attending: FAMILY MEDICINE
Payer: MEDICARE

## 2025-04-16 PROCEDURE — 97110 THERAPEUTIC EXERCISES: CPT

## 2025-04-16 PROCEDURE — 97112 NEUROMUSCULAR REEDUCATION: CPT

## 2025-04-16 NOTE — PROGRESS NOTES
Patient: Karly Green (69 year old, female) Referring Provider:  Insurance:   Diagnosis: At high risk for falls (Z91.81) Kay LAND   Date of Surgery: No data recorded Next MD visit:  N/A   Precautions:  Fall Risk No data recorded Referral Information:    Date of Evaluation: Req: 14, Auth: 14, Exp: 6/3/2025    02/19/25 POC Auth Visits:  14       Today's Date   4/16/2025    Subjective  The patient presents to physical therapy with her rollator walker. She states that she had no problem getting the walker out of the car. She has not had any falls or almost falls.       Pain: pain not reported     Objective          Observation: pt ambulates safely with use of rollator walker, adjusted height for proper fit by raising walker height 1 notch.      Assessment  Continuing to work on improving ability to negotiate obstacles in the environment and the patient's ability to walk on an unevel surface. She improves in her ability to take steps with no UE support with practice. The patient was able to ambulate on an uneven surface with single upper extremity support today.    Goals (to be met in 14 visits)     Therapy Goals     Not Met Progress Toward Partially Met Met   Pt will demonstrate improved tandem stance to >5 seconds ASHLEE to promote safety and decrease risk of falls on uneven surfaces such as grass and gravel. [] [] [x] []   Pt will perform TUG in <60 seconds with least restrictive AD, demonstrating improved gait speed for community ambulation.  UPDATE: Pt will perform TUG with least restrictive AD in <35 seconds [] [] [x] [x]   Pt will demonstrate safety when ambulating in the clinic 100% of the time [] [x] [] []   Pt will be able to perform sit<>stand with little difficulty [] [x] [] []   Pt will improve functional hip strength to demonstrate ability to ascend/descend 1 flight of stairs reciprocally with use of handrail. [] [x] [] []   Pt will be independent and compliant with comprehensive HEP to  maintain progress achieved in PT. [] [x] [] []                       Plan  continue stepping no UE support    Treatment Last 4 Visits  Treatment Day: 9       3/21/2025 4/1/2025 4/11/2025 4/16/2025   Neuro Treatment   Therapeutic Exercise Nustep 4 min  Sit<>stand from standard chair, no UE support 3x5 Nustep, subjective  Reassessment objective findings  Walking with rollator, figure 8 around cones  Nustep L7, 4 min subjective  RW height adjustment  Step up on airex pad single UE support 2x10 ea   Neuro Re-Education Toe tap on 4\" box 2x10 ea  Cone tap 2 arm support 10x, single UE support 10x  Step up on airex pad single UE support, 10x ea  Lateral stepping on airex beam 2x down/back with UE support   Step up on sand dune  10x ea  Standing balance on sand dune 3x30 sec  Walking forward in // bars, stepping over jessica, 1/2 FR on floor with single UE support  Tilt board A-P with UE support  Assessment static balance Step up airex 10x ea,  Balance airex 3x30 sec  Forward walking in // bars with no UE support  Forward step over theraband on floor 2x5 ea  Forward step arms out no UE support 3x5 ea Tilt board 2x10  Standing airex pad balance 3x30 sec  Forward step over theraband on floor no UE support 2x5 ea   Ambulation on uneven surface (ankle weights on floor with mat over weights), UE support for balance 4x down/back  Forward walking in // bars stepping over hurdles single UE support 4x   Gait Training   Forward walking stepping over hurdles to for negotiating obstacles  FSU 6\" box 2x6 ea    Therapeutic Exercise Minutes 10 15  13   Neuro Re-Educ Minutes 32 29 32 29   Gait Training Minutes   13    Total Time Of Timed Procedures 42 44 45 42   Total Time Of Service-Based Procedures 0 0 0 0   Total Treatment Time 42 44 45 42   HEP Access Code: [email or web address]  URL: [email or web address]  Date: 03/21/2025  Prepared by: Nita    Exercises  - Narrow Stance with Counter Support  - 1-2 x daily - 7 x weekly - 1 sets - 3 reps - 30  sec hold  - Side Stepping with Counter Support  - 1 x daily - 7 x weekly - 1 sets - 5 reps  - Seated Knee Extension with Resistance  - 1 x daily - 7 x weekly - 3 sets - 8 reps  - Sit to Stand Without Arm Support  - 1 x daily - 7 x weekly - 3 sets - 5 reps           HEP  Access Code: [email or web address]  URL: [email or web address]  Date: 03/21/2025  Prepared by: Nita Brown  - Narrow Stance with Counter Support  - 1-2 x daily - 7 x weekly - 1 sets - 3 reps - 30 sec hold  - Side Stepping with Counter Support  - 1 x daily - 7 x weekly - 1 sets - 5 reps  - Seated Knee Extension with Resistance  - 1 x daily - 7 x weekly - 3 sets - 8 reps  - Sit to Stand Without Arm Support  - 1 x daily - 7 x weekly - 3 sets - 5 reps    Charges  ther ex x1, neuro re-ed x2

## 2025-04-22 ENCOUNTER — OFFICE VISIT (OUTPATIENT)
Dept: PHYSICAL THERAPY | Facility: HOSPITAL | Age: 70
End: 2025-04-22
Attending: FAMILY MEDICINE
Payer: MEDICARE

## 2025-04-22 PROCEDURE — 97112 NEUROMUSCULAR REEDUCATION: CPT

## 2025-04-23 ENCOUNTER — TELEPHONE (OUTPATIENT)
Dept: NEUROLOGY | Facility: CLINIC | Age: 70
End: 2025-04-23

## 2025-04-23 ENCOUNTER — TELEPHONE (OUTPATIENT)
Dept: FAMILY MEDICINE CLINIC | Facility: CLINIC | Age: 70
End: 2025-04-23

## 2025-04-23 NOTE — TELEPHONE ENCOUNTER
Pt called Dr Olson's office to schedule the neuro testing that Irasema had wanted but they said they dont do that kind of testing so she's not sure where to go

## 2025-04-23 NOTE — PROGRESS NOTES
Patient: Karly Green (69 year old, female) Referring Provider:  Insurance:   Diagnosis: At high risk for falls (Z91.81) Kay Villalpando  [facility]   Date of Surgery: No data recorded Next MD visit:  N/A   Precautions:  Fall Risk No data recorded Referral Information:    Date of Evaluation: Req: 14, Auth: 14, Exp: 6/3/2025    02/19/25 POC Auth Visits:  14       Today's Date   4/22/2025    Subjective  The patient states that she has been more wobbly in the last couple of days, she is not sure why. It was not with any certain activities. She was standing a lot and made 2 lamb cakes, it might be because she was standing a lot. She is sore in her back from the baking and is still sore today.       Pain: pain not reported (back is uncomfortable)     Objective          Observation: pt is unsteady with occasional loss of balance to side (recovers with holding parallel bar) with lunge balance posture.        Assessment  Adjusted the patient's walker again to achieve an appropriate height, and she noted improvements with the adjusted height. Continuing to increase challenges on balance system to improve utilization of balance strategies and to improve safety with mobility. The patient asked about taking balance classes- issued a print out for potential classes through the [facility] including a balance exercise class and a Kevin Chi class. The patient requires continued skilled physical therapy to improve balance and reduce risk for falls.    Goals (to be met in 14 visits)     Therapy Goals     Not Met Progress Toward Partially Met Met   Pt will demonstrate improved tandem stance to >5 seconds ASHLEE to promote safety and decrease risk of falls on uneven surfaces such as grass and gravel. [] [] [x] []   Pt will perform TUG in <60 seconds with least restrictive AD, demonstrating improved gait speed for community ambulation.  UPDATE: Pt will perform TUG with least restrictive AD in <35 seconds [] [] [x] [x]   Pt  will demonstrate safety when ambulating in the clinic 100% of the time [] [x] [] []   Pt will be able to perform sit<>stand with little difficulty [] [x] [] []   Pt will improve functional hip strength to demonstrate ability to ascend/descend 1 flight of stairs reciprocally with use of handrail. [] [x] [] []   Pt will be independent and compliant with comprehensive HEP to maintain progress achieved in PT. [] [x] [] []                           Plan  continue ambulation forward/back    Treatment Last 4 Visits  Treatment Day: 10       4/1/2025 4/11/2025 4/16/2025 4/22/2025   Neuro Treatment   Therapeutic Exercise Nustep, subjective  Reassessment objective findings  Walking with rollator, figure 8 around cones  Nustep L7, 4 min subjective  RW height adjustment  Step up on airex pad single UE support 2x10 ea    Neuro Re-Education Step up on sand dune  10x ea  Standing balance on sand dune 3x30 sec  Walking forward in // bars, stepping over jessica, 1/2 FR on floor with single UE support  Tilt board A-P with UE support  Assessment static balance Step up airex 10x ea,  Balance airex 3x30 sec  Forward walking in // bars with no UE support  Forward step over theraband on floor 2x5 ea  Forward step arms out no UE support 3x5 ea Tilt board 2x10  Standing airex pad balance 3x30 sec  Forward step over theraband on floor no UE support 2x5 ea   Ambulation on uneven surface (ankle weights on floor with mat over weights), UE support for balance 4x down/back  Forward walking in // bars stepping over hurdles single UE support 4x Nustep x4 min, subjective  Walking in // bars forward and backwards  Stepping forward in // bars no UE support, retro walking no UE support 3x down/back  Step up sand dune single UE support 2x10 ea  Lunge position balance 3x30 sec ea, no UE support   Gait Training  Forward walking stepping over hurdles to for negotiating obstacles  FSU 6\" box 2x6 ea     Therapeutic Exercise Minutes 15  13    Neuro Re-Educ  Minutes 29 32 29 44   Gait Training Minutes  13     Total Time Of Timed Procedures 44 45 42 44   Total Time Of Service-Based Procedures 0 0 0 0   Total Treatment Time 44 45 42 44        HEP  Access Code: [email or web address]  URL: [email or web address]  Date: 03/21/2025  Prepared by: Nita    Exercises  - Narrow Stance with Counter Support  - 1-2 x daily - 7 x weekly - 1 sets - 3 reps - 30 sec hold  - Side Stepping with Counter Support  - 1 x daily - 7 x weekly - 1 sets - 5 reps  - Seated Knee Extension with Resistance  - 1 x daily - 7 x weekly - 3 sets - 8 reps  - Sit to Stand Without Arm Support  - 1 x daily - 7 x weekly - 3 sets - 5 reps    Charges  neuro re-ed x3

## 2025-04-23 NOTE — TELEPHONE ENCOUNTER
Patient stated she was advised to get further testing completed and unsure of what tests are needed at this time.    Please call patient to discuss testing details and advise.

## 2025-04-23 NOTE — TELEPHONE ENCOUNTER
Pt called to schedule w/Dr. Olson and said I need to \"make an appt per Dr. Villalpando for further neurological testing\" and was told they don't do that. Please advise.

## 2025-04-23 NOTE — TELEPHONE ENCOUNTER
Spoke to patient who thought she was suppose to call this office to schedule a neuropsychology evaluation.    Advised patient to call one of the providers and see if they can give her an appointment and if they will take her insurance. If not to continue down the list.    Patient verbalized understanding and will call back with any problems.

## 2025-04-24 NOTE — TELEPHONE ENCOUNTER
Dr. Villalpando see note below from patient.  Did you want her to have neuropsych testing?   If so she needs to call her insurance and find an in network neuropsychologist.  [facility]

## 2025-04-24 NOTE — TELEPHONE ENCOUNTER
Yes, neuropsych testing. I believe I had given that information but perhaps it was not understood.   Kay Villalpando, DO

## 2025-04-25 NOTE — TELEPHONE ENCOUNTER
Kalry notified she needs a specific type of neurologist for neuro-psych testing.   She will call [employer] and ask for a Neuro-Psychologist in her network and then we will place referral.  She will call back.

## 2025-04-30 ENCOUNTER — OFFICE VISIT (OUTPATIENT)
Dept: PHYSICAL THERAPY | Facility: HOSPITAL | Age: 70
End: 2025-04-30
Attending: FAMILY MEDICINE
Payer: MEDICARE

## 2025-04-30 PROCEDURE — 97112 NEUROMUSCULAR REEDUCATION: CPT

## 2025-05-01 NOTE — PROGRESS NOTES
Patient: Karly Green (69 year old, female) Referring Provider:  Insurance:   Diagnosis: At high risk for falls (Z91.81) Kay LAND   Date of Surgery: No data recorded Next MD visit:  N/A   Precautions:  Fall Risk No data recorded Referral Information:    Date of Evaluation: Req: 14, Auth: 14, Exp: 6/3/2025    02/19/25 POC Auth Visits:  14       Today's Date   4/30/2025    Subjective  The patient states that she was really sore after the last session. She has not had any falls.       Pain: 0/10     Objective          Observation: pt has decreased step length when walking backwards in // bar, particularly with the left lower extremity. Improves with cueing.     Assessment  The patient demonstrated improved ability to negotiate cones with the rolling walker today. Performed reaching tasks to work to improve ability to reach in and out of base of support. Encouraged the patient to pursue community balance classes to continue to work on improving her balance.    Goals (to be met in 14 visits)     Therapy Goals     Not Met Progress Toward Partially Met Met   Pt will demonstrate improved tandem stance to >5 seconds ASHLEE to promote safety and decrease risk of falls on uneven surfaces such as grass and gravel. [] [] [x] []   Pt will perform TUG in <60 seconds with least restrictive AD, demonstrating improved gait speed for community ambulation.  UPDATE: Pt will perform TUG with least restrictive AD in <35 seconds [] [] [x] [x]   Pt will demonstrate safety when ambulating in the clinic 100% of the time [] [x] [] []   Pt will be able to perform sit<>stand with little difficulty [] [x] [] []   Pt will improve functional hip strength to demonstrate ability to ascend/descend 1 flight of stairs reciprocally with use of handrail. [] [x] [] []   Pt will be independent and compliant with comprehensive HEP to maintain progress achieved in PT. [] [x] [] []                               Plan  tilt board staggered  stance    Treatment Last 4 Visits  Treatment Day: 11 4/11/2025 4/16/2025 4/22/2025 4/30/2025   Neuro Treatment   Therapeutic Exercise  Nustep L7, 4 min subjective  RW height adjustment  Step up on airex pad single UE support 2x10 ea     Neuro Re-Education Step up airex 10x ea,  Balance airex 3x30 sec  Forward walking in // bars with no UE support  Forward step over theraband on floor 2x5 ea  Forward step arms out no UE support 3x5 ea Tilt board 2x10  Standing airex pad balance 3x30 sec  Forward step over theraband on floor no UE support 2x5 ea   Ambulation on uneven surface (ankle weights on floor with mat over weights), UE support for balance 4x down/back  Forward walking in // bars stepping over hurdles single UE support 4x Nustep x4 min, subjective  Walking in // bars forward and backwards  Stepping forward in // bars no UE support, retro walking no UE support 3x down/back  Step up sand dune single UE support 2x10 ea  Lunge position balance 3x30 sec ea, no UE support Walking around cones with RW in figure 8 4x  Walking forward and backwards in // bars no UE support 4x  Large step with no UE support 2x8  Reaching for cones with wide ELVIS then narrow ELVIS 2x8 ea   Gait Training Forward walking stepping over hurdles to for negotiating obstacles  FSU 6\" box 2x6 ea      Therapeutic Exercise Minutes  13     Neuro Re-Educ Minutes 32 29 44 44   Gait Training Minutes 13      Total Time Of Timed Procedures 45 42 44 44   Total Time Of Service-Based Procedures 0 0 0 0   Total Treatment Time 45 42 44 44        HEP  Access Code: [email or web address]  URL: [email or web address]  Date: 03/21/2025  Prepared by: Nita    Exercises  - Narrow Stance with Counter Support  - 1-2 x daily - 7 x weekly - 1 sets - 3 reps - 30 sec hold  - Side Stepping with Counter Support  - 1 x daily - 7 x weekly - 1 sets - 5 reps  - Seated Knee Extension with Resistance  - 1 x daily - 7 x weekly - 3 sets - 8 reps  - Sit to Stand Without Arm Support  - 1  x daily - 7 x weekly - 3 sets - 5 reps    Charges  neuro re-ed x3

## 2025-05-08 ENCOUNTER — OFFICE VISIT (OUTPATIENT)
Dept: PHYSICAL THERAPY | Facility: HOSPITAL | Age: 70
End: 2025-05-08
Attending: FAMILY MEDICINE
Payer: MEDICARE

## 2025-05-08 PROCEDURE — 97112 NEUROMUSCULAR REEDUCATION: CPT

## 2025-05-08 NOTE — PROGRESS NOTES
Patient: Karly Green (69 year old, female) Referring Provider:  Insurance:   Diagnosis:   Kay Irasema  [employer] MCR   Date of Surgery: No data recorded Next MD visit:  N/A   Precautions:  Fall Risk No data recorded Referral Information:    Date of Evaluation: Req: 14, Auth: 14, Exp: 6/3/2025    No data recorded POC Auth Visits:  14       Today's Date   5/8/2025    Subjective  Felt sore after last session in the legs. Feeling a little bit sore. Patient states she lives in pain most in the back. Patient feels confident ambulating in the community with walker and moving in the house without walker.       Pain: 3/10     Objective  Tandem stand: L 5s; R 10s 5xSTS: only able to complete 3 - 22 seconds          Assessment  Patient feels sore after PT in her legs but no increases in pain. Patient is still struggling with walking balance and LE strength. PT focused on improving walking balance and LE strength.    This treatment was provided by OBED Caceres under the direct and constant direction and supervision of a licensed therapist, who provided consultation regarding skilled judgements, treatment, and assessment of patient care.      Goals (to be met in 14 visits)   Therapy Goals     Not Met Progress Toward Partially Met Met   Pt will demonstrate improved tandem stance to >5 seconds ASHLEE to promote safety and decrease risk of falls on uneven surfaces such as grass and gravel. [] [] [x] []   Pt will perform TUG in <60 seconds with least restrictive AD, demonstrating improved gait speed for community ambulation.  UPDATE: Pt will perform TUG with least restrictive AD in <35 seconds [] [] [x] [x]   Pt will demonstrate safety when ambulating in the clinic 100% of the time [] [x] [] []   Pt will be able to perform sit<>stand with little difficulty [] [x] [] []   Pt will improve functional hip strength to demonstrate ability to ascend/descend 1 flight of stairs reciprocally with use of handrail. [] [x] [] []   Pt will  be independent and compliant with comprehensive HEP to maintain progress achieved in PT. [] [x] [] []             Plan  Continue to progress HEP, walking balance, and LE strength as tolerated.    Treatment Last 4 Visits  Treatment Day: 12 4/16/2025 4/22/2025 4/30/2025 5/8/2025   Neuro Treatment   Therapeutic Exercise Nustep L7, 4 min subjective  RW height adjustment  Step up on airex pad single UE support 2x10 ea      Neuro Re-Education Tilt board 2x10  Standing airex pad balance 3x30 sec  Forward step over theraband on floor no UE support 2x5 ea   Ambulation on uneven surface (ankle weights on floor with mat over weights), UE support for balance 4x down/back  Forward walking in // bars stepping over hurdles single UE support 4x Nustep x4 min, subjective  Walking in // bars forward and backwards  Stepping forward in // bars no UE support, retro walking no UE support 3x down/back  Step up sand dune single UE support 2x10 ea  Lunge position balance 3x30 sec ea, no UE support Walking around cones with RW in figure 8 4x  Walking forward and backwards in // bars no UE support 4x  Large step with no UE support 2x8  Reaching for cones with wide ELVIS then narrow ELVIS 2x8 ea Walking through parallel bars while counting down from 100 by 5s x4   Side stepping while tapping a balloon x4   5 STS no UE support  Walking around cones with walker 15' x4   Retrowalking parallel bars x4  Tandem walking parallel bars x2     Therapeutic Exercise Minutes 13      Neuro Re-Educ Minutes 29 44 44 45   Total Time Of Timed Procedures 42 44 44 45   Total Time Of Service-Based Procedures 0 0 0 0   Total Treatment Time 42 44 44 45   HEP    Access Code: [email or web address]  URL: [email or web address]  Date: 03/21/2025  Prepared by: Nita    Exercises  - Narrow Stance with Counter Support  - 1-2 x daily - 7 x weekly - 1 sets - 3 reps - 30 sec hold  - Side Stepping with Counter Support  - 1 x daily - 7 x weekly - 1 sets - 5 reps  - Seated Knee  Extension with Resistance  - 1 x daily - 7 x weekly - 3 sets - 8 reps  - Sit to Stand Without Arm Support  - 1 x daily - 7 x weekly - 3 sets - 5 reps        HEP  Access Code: [email or web address]  URL: [email or web address]  Date: 03/21/2025  Prepared by: Nita    Exercises  - Narrow Stance with Counter Support  - 1-2 x daily - 7 x weekly - 1 sets - 3 reps - 30 sec hold  - Side Stepping with Counter Support  - 1 x daily - 7 x weekly - 1 sets - 5 reps  - Seated Knee Extension with Resistance  - 1 x daily - 7 x weekly - 3 sets - 8 reps  - Sit to Stand Without Arm Support  - 1 x daily - 7 x weekly - 3 sets - 5 reps    Charges  neuro re-ed  x3

## 2025-05-14 ENCOUNTER — OFFICE VISIT (OUTPATIENT)
Dept: PHYSICAL THERAPY | Facility: HOSPITAL | Age: 70
End: 2025-05-14
Attending: FAMILY MEDICINE
Payer: MEDICARE

## 2025-05-14 PROCEDURE — 97110 THERAPEUTIC EXERCISES: CPT

## 2025-05-14 PROCEDURE — 97112 NEUROMUSCULAR REEDUCATION: CPT

## 2025-05-14 NOTE — PROGRESS NOTES
Patient: Karly Green (69 year old, female) Referring Provider:  Insurance:   Diagnosis:   Kay ROBERTS MCR   Date of Surgery: No data recorded Next MD visit:  N/A   Precautions:  Fall Risk No data recorded Referral Information:    Date of Evaluation: Req: 14, Auth: 14, Exp: 6/3/2025    No data recorded POC Auth Visits:  14       Today's Date   5/14/2025    Subjective  Patient states that she is feeling a little sore today. She is feeling a down because her mom passed over the weekend. She is feeling up for therapy today. Patient has some minor pain in her R knee. Patient feels good after therapy and feels that it is helping. She likes focusing on her balance.       Pain: 2/10     Objective  5xSTS - 22s          Assessment  Patient is doing good overall and showed good tolerance with the exercises in PT. Patient is able to challenge herself in PT and demonstrates a good intensity to exercise in PT. Patient is still struggling with turning while using walker, single leg and tandem balance, and lower extremity strength. PT focused on taking sharper turns and more effective turns and functional strength with sit to stands.    Goals (to be met in 14 visits)    Not Met Progress Toward Partially Met Met   Pt will demonstrate improved tandem stance to >5 seconds ASHLEE to promote safety and decrease risk of falls on uneven surfaces such as grass and gravel. [] [] [x] []   Pt will perform TUG in <60 seconds with least restrictive AD, demonstrating improved gait speed for community ambulation.  UPDATE: Pt will perform TUG with least restrictive AD in <35 seconds [] [] [x] [x]   Pt will demonstrate safety when ambulating in the clinic 100% of the time [] [x] [] []   Pt will be able to perform sit<>stand with little difficulty [] [x] [] []   Pt will improve functional hip strength to demonstrate ability to ascend/descend 1 flight of stairs reciprocally with use of handrail. [] [x] [] []   Pt will be independent and  compliant with comprehensive HEP to maintain progress achieved in PT. [] [x] [] []     This treatment was provided by OBED Caceres under the direct and constant direction and supervision of a licensed therapist, who provided consultation regarding skilled judgements, treatment, and assessment of patient care.       Plan  Reassessment to determine plan of care for physical therapy    Treatment Last 4 Visits  Treatment Day: 13 4/22/2025 4/30/2025 5/8/2025 5/14/2025   Neuro Treatment   Therapeutic Exercise    Sit to stand w/out UE support 3x5      Neuro Re-Education Nustep x4 min, subjective  Walking in // bars forward and backwards  Stepping forward in // bars no UE support, retro walking no UE support 3x down/back  Step up sand dune single UE support 2x10 ea  Lunge position balance 3x30 sec ea, no UE support Walking around cones with RW in figure 8 4x  Walking forward and backwards in // bars no UE support 4x  Large step with no UE support 2x8  Reaching for cones with wide ELVIS then narrow ELVIS 2x8 ea Walking through parallel bars while counting down from 100 by 5s x4   Side stepping while tapping a balloon x4   5 STS no UE support  Walking around cones with walker 15' x4   Retrowalking parallel bars x4  Tandem walking parallel bars x2   Walking with 90 deg turns in a square with walker x3   Tandem walking parallel bars x4   Walking over obstacles in parallel bars x4      Therapeutic Exercise Minutes    30   Neuro Re-Educ Minutes 44 44 45 15   Total Time Of Timed Procedures 44 44 45 45   Total Time Of Service-Based Procedures 0 0 0 0   Total Treatment Time 44 44 45 45   HEP   Access Code: [email or web address]  URL: [email or web address]  Date: 03/21/2025  Prepared by: Nita    Exercises  - Narrow Stance with Counter Support  - 1-2 x daily - 7 x weekly - 1 sets - 3 reps - 30 sec hold  - Side Stepping with Counter Support  - 1 x daily - 7 x weekly - 1 sets - 5 reps  - Seated Knee Extension with Resistance  - 1 x  daily - 7 x weekly - 3 sets - 8 reps  - Sit to Stand Without Arm Support  - 1 x daily - 7 x weekly - 3 sets - 5 reps             HEP  Access Code: [email or web address]  URL: [email or web address]  Date: 03/21/2025  Prepared by: Nita    Exercises  - Narrow Stance with Counter Support  - 1-2 x daily - 7 x weekly - 1 sets - 3 reps - 30 sec hold  - Side Stepping with Counter Support  - 1 x daily - 7 x weekly - 1 sets - 5 reps  - Seated Knee Extension with Resistance  - 1 x daily - 7 x weekly - 3 sets - 8 reps  - Sit to Stand Without Arm Support  - 1 x daily - 7 x weekly - 3 sets - 5 reps    Charges  Therex x1 NeurReed x2

## 2025-05-21 ENCOUNTER — OFFICE VISIT (OUTPATIENT)
Dept: PHYSICAL THERAPY | Facility: HOSPITAL | Age: 70
End: 2025-05-21
Attending: FAMILY MEDICINE
Payer: MEDICARE

## 2025-05-21 DIAGNOSIS — Z91.81 AT HIGH RISK FOR FALLS: Primary | ICD-10-CM

## 2025-05-21 PROCEDURE — 97110 THERAPEUTIC EXERCISES: CPT

## 2025-05-21 PROCEDURE — 97112 NEUROMUSCULAR REEDUCATION: CPT

## 2025-05-21 NOTE — PROGRESS NOTES
Patient: Karly Green (69 year old, female) Referring Provider:  Insurance:   Diagnosis: At high risk for falls (Z91.81) Kay LAND   Date of Surgery: No data recorded Next MD visit:  N/A   Precautions:  Fall Risk No data recorded Referral Information:    Date of Evaluation: Req: 14, Auth: 14, Exp: 6/3/2025    No data recorded POC Auth Visits:  14      Discharge Summary  Pt has attended 14 visits in Physical Therapy.     Today's Date   5/21/2025    Subjective  The patient reports taht her back is hurting today, it is aching. she states that after her mom passed away she had a knot in her back and it has moved lower. It is a constant ache, she is not sure if it gets worse with certain movements and activities.       Pain: 4/10     Objective              TUG 37 sec with RW, 2nd time 30 sec    Romberg EO: level surface 30 sec; compliant surface 0 sec (NT)     Romberg EC: level surface 0 sec (NT); compliant surface 0 sec (NT)  Tandem Stance: R back: 6 sec (was 3 sec), L back 11 sec) (half tandem 26 sec R in back, 30 sec L in back (was 25 sec)); L back:  ; Fall Risk: Yes  SLS: R: 0 sec (NT); L: 0 sec (NT); Fall Risk: Yes    Ambulation with SP cane: unsteadiness is noted, pt requires hand hold with PT in addition to cane. She demonstrates difficulty turning and wide ELVIS with ambulation.      Assessment  The patient has demonstrated improvements in static and dynamic balance testing since the initial session. She demonstrates safe gait mechanics with use of a rolling walker. We assesed gait and safety with ambulation with use of a single point cane today, and the patient required additional PT assist with hand hold in addition to the cane. The patient at this time will continue on with home exercises as she has met nearly all of her physical therapy goals. Recommended that Karly continue with exercise and balance exercises at home and consider joining balance classes. Offered referral to the medical  fitness program.    Goals (to be met in 14 visits)      Not Met Progress Toward Partially Met Met   Pt will demonstrate improved tandem stance to >5 seconds ASHLEE to promote safety and decrease risk of falls on uneven surfaces such as grass and gravel. [] [] [] [x]   Pt will perform TUG in <60 seconds with least restrictive AD, demonstrating improved gait speed for community ambulation.  UPDATE: Pt will perform TUG with least restrictive AD in <35 seconds [] [] [] [x]   Pt will demonstrate safety when ambulating in the clinic 100% of the time [] [] [] [x]   Pt will be able to perform sit<>stand with little difficulty [] [] [x] []   Pt will improve functional hip strength to demonstrate ability to ascend/descend 1 flight of stairs reciprocally with use of handrail. [] [] [] [x]   Pt will be independent and compliant with comprehensive HEP to maintain progress achieved in PT. [] [] [] [x]       FES Score  Score: (Patient-Rptd) 48.44 % (2/19/2025  6:30 PM)    Score and level of concern: (Patient-Rptd) 31 - high concern (2/19/2025  6:30 PM)        Post FES Score  Post Score: (Patient-Rptd) 51.56 % (5/21/2025  2:46 PM)    Score and level of concern (post): (Patient-Rptd) 33 - high concern (5/21/2025  2:46 PM)    -3.12 % improvement    Plan: The patient will be discharged from this plan of care for physical therapy at this time.      Patient/Family/Caregiver was advised of these findings, precautions, and treatment options and has agreed to actively participate in planning and for this course of care.    Thank you for your referral. If you have any questions, please contact me at Dept: [phone].    Sincerely,  Electronically signed by therapist: Nita Mcguire, PT       Certification From: 5/21/2025  To:8/19/2025        Plan       Treatment Last 4 Visits  Treatment Day: 14 4/30/2025 5/8/2025 5/14/2025 5/21/2025   Neuro Treatment   Therapeutic Exercise   Sit to stand w/out UE support 3x5    Reassessment of objective  findings  Standing march with single UE support 2x10 ea  Discussion POC with patient and , recommendations for continued activities at home and with balance classes   Neuro Re-Education Walking around cones with RW in figure 8 4x  Walking forward and backwards in // bars no UE support 4x  Large step with no UE support 2x8  Reaching for cones with wide ELVIS then narrow ELVIS 2x8 ea Walking through parallel bars while counting down from 100 by 5s x4   Side stepping while tapping a balloon x4   5 STS no UE support  Walking around cones with walker 15' x4   Retrowalking parallel bars x4  Tandem walking parallel bars x2   Walking with 90 deg turns in a square with walker x3   Tandem walking parallel bars x4   Walking over obstacles in parallel bars x4    Lateral stepping no UE support // bars 2x down/back  TUG assessment  Ambulation with SP cane, gait belt and min assist by PT  Feet together static balance on firm surface   Therapeutic Exercise Minutes   30 16   Neuro Re-Educ Minutes 44 45 15 29   Total Time Of Timed Procedures 44 45 45 45   Total Time Of Service-Based Procedures 0 0 0 0   Total Treatment Time 44 45 45 45   HEP  Access Code: [email or web address]  URL: [email or web address]  Date: 03/21/2025  Prepared by: Nita    Exercises  - Narrow Stance with Counter Support  - 1-2 x daily - 7 x weekly - 1 sets - 3 reps - 30 sec hold  - Side Stepping with Counter Support  - 1 x daily - 7 x weekly - 1 sets - 5 reps  - Seated Knee Extension with Resistance  - 1 x daily - 7 x weekly - 3 sets - 8 reps  - Sit to Stand Without Arm Support  - 1 x daily - 7 x weekly - 3 sets - 5 reps          HEP  Access Code: [email or web address]  URL: [email or web address]  Date: 03/21/2025  Prepared by: Nita    Exercises  - Narrow Stance with Counter Support  - 1-2 x daily - 7 x weekly - 1 sets - 3 reps - 30 sec hold  - Side Stepping with Counter Support  - 1 x daily - 7 x weekly - 1 sets - 5 reps  - Seated Knee Extension with Resistance  - 1 x  daily - 7 x weekly - 3 sets - 8 reps  - Sit to Stand Without Arm Support  - 1 x daily - 7 x weekly - 3 sets - 5 reps    Charges  ther ex x1, neuro re-ed x2

## 2025-05-30 DIAGNOSIS — F32.1 CURRENT MODERATE EPISODE OF MAJOR DEPRESSIVE DISORDER WITHOUT PRIOR EPISODE (HCC): ICD-10-CM

## 2025-05-30 NOTE — TELEPHONE ENCOUNTER
Refill request for:    Requested Prescriptions     Pending Prescriptions Disp Refills    CITALOPRAM 20 MG Oral Tab [Pharmacy Med Name: Citalopram Hydrobromide Oral Tablet 20 MG] 90 tablet 0     Sig: TAKE ONE TABLET BY MOUTH ONE TIME DAILY        Last Prescribed Quantity Refills   03/06/2025 90 0     LOV 3/6/2025     Patient was asked to follow-up in: 3 months        Appointment scheduled: 6/6/2025 Kay Villalpando DO    Medication not on protocol.     # 90 with 0 refills routed to Kay Villalpando DO for review

## 2025-06-02 RX ORDER — CITALOPRAM HYDROBROMIDE 20 MG/1
20 TABLET ORAL DAILY
Qty: 90 TABLET | Refills: 0 | Status: SHIPPED | OUTPATIENT
Start: 2025-06-02

## 2025-06-06 ENCOUNTER — OFFICE VISIT (OUTPATIENT)
Dept: FAMILY MEDICINE CLINIC | Facility: CLINIC | Age: 70
End: 2025-06-06
Payer: MEDICARE

## 2025-06-06 VITALS
SYSTOLIC BLOOD PRESSURE: 142 MMHG | HEIGHT: 65.75 IN | BODY MASS INDEX: 43.75 KG/M2 | WEIGHT: 269 LBS | DIASTOLIC BLOOD PRESSURE: 75 MMHG | HEART RATE: 99 BPM | RESPIRATION RATE: 14 BRPM | OXYGEN SATURATION: 99 %

## 2025-06-06 DIAGNOSIS — F32.1 CURRENT MODERATE EPISODE OF MAJOR DEPRESSIVE DISORDER WITHOUT PRIOR EPISODE (HCC): ICD-10-CM

## 2025-06-06 DIAGNOSIS — I10 ESSENTIAL HYPERTENSION: ICD-10-CM

## 2025-06-06 DIAGNOSIS — G89.29 CHRONIC BILATERAL LOW BACK PAIN WITH BILATERAL SCIATICA: ICD-10-CM

## 2025-06-06 DIAGNOSIS — E66.813 CLASS 3 SEVERE OBESITY WITHOUT SERIOUS COMORBIDITY WITH BODY MASS INDEX (BMI) OF 40.0 TO 44.9 IN ADULT: ICD-10-CM

## 2025-06-06 DIAGNOSIS — K21.9 GASTROESOPHAGEAL REFLUX DISEASE, UNSPECIFIED WHETHER ESOPHAGITIS PRESENT: ICD-10-CM

## 2025-06-06 DIAGNOSIS — G31.84 MCI (MILD COGNITIVE IMPAIRMENT): ICD-10-CM

## 2025-06-06 DIAGNOSIS — R73.03 PREDIABETES: ICD-10-CM

## 2025-06-06 DIAGNOSIS — N18.31 STAGE 3A CHRONIC KIDNEY DISEASE (HCC): Chronic | ICD-10-CM

## 2025-06-06 DIAGNOSIS — G91.9 HYDROCEPHALUS, UNSPECIFIED TYPE (HCC): ICD-10-CM

## 2025-06-06 DIAGNOSIS — E21.3 HYPERPARATHYROIDISM (HCC): ICD-10-CM

## 2025-06-06 DIAGNOSIS — Z12.31 VISIT FOR SCREENING MAMMOGRAM: ICD-10-CM

## 2025-06-06 DIAGNOSIS — M54.41 CHRONIC BILATERAL LOW BACK PAIN WITH BILATERAL SCIATICA: ICD-10-CM

## 2025-06-06 DIAGNOSIS — Z91.81 AT HIGH RISK FOR FALLS: ICD-10-CM

## 2025-06-06 DIAGNOSIS — M54.42 CHRONIC BILATERAL LOW BACK PAIN WITH BILATERAL SCIATICA: ICD-10-CM

## 2025-06-06 DIAGNOSIS — Z00.00 ENCOUNTER FOR ANNUAL HEALTH EXAMINATION: Primary | ICD-10-CM

## 2025-06-06 DIAGNOSIS — E78.5 HYPERLIPIDEMIA, UNSPECIFIED HYPERLIPIDEMIA TYPE: ICD-10-CM

## 2025-06-06 PROCEDURE — 3077F SYST BP >= 140 MM HG: CPT | Performed by: FAMILY MEDICINE

## 2025-06-06 PROCEDURE — 99214 OFFICE O/P EST MOD 30 MIN: CPT | Performed by: FAMILY MEDICINE

## 2025-06-06 PROCEDURE — 1170F FXNL STATUS ASSESSED: CPT | Performed by: FAMILY MEDICINE

## 2025-06-06 PROCEDURE — 3008F BODY MASS INDEX DOCD: CPT | Performed by: FAMILY MEDICINE

## 2025-06-06 PROCEDURE — 96160 PT-FOCUSED HLTH RISK ASSMT: CPT | Performed by: FAMILY MEDICINE

## 2025-06-06 PROCEDURE — G0439 PPPS, SUBSEQ VISIT: HCPCS | Performed by: FAMILY MEDICINE

## 2025-06-06 PROCEDURE — 1159F MED LIST DOCD IN RCRD: CPT | Performed by: FAMILY MEDICINE

## 2025-06-06 PROCEDURE — 1160F RVW MEDS BY RX/DR IN RCRD: CPT | Performed by: FAMILY MEDICINE

## 2025-06-06 PROCEDURE — 3078F DIAST BP <80 MM HG: CPT | Performed by: FAMILY MEDICINE

## 2025-06-06 PROCEDURE — 99499 UNLISTED E&M SERVICE: CPT | Performed by: FAMILY MEDICINE

## 2025-06-06 PROCEDURE — 1125F AMNT PAIN NOTED PAIN PRSNT: CPT | Performed by: FAMILY MEDICINE

## 2025-06-06 NOTE — PROGRESS NOTES
The following individual(s) verbally consented to be recorded using ambient AI listening technology and understand that they can each withdraw their consent to this listening technology at any point by asking the clinician to turn off or pause the recording:    Patient name: Karly TAYLOR Green

## 2025-06-06 NOTE — PROGRESS NOTES
Subjective:   Karly Green is a 69 year old female who presents for a MA AHA (Medicare Advantage Annual Health Assessment) and Subsequent Annual Wellness visit (Pt already had Initial Annual Wellness) and scheduled follow up of multiple significant but stable problems.   History of Present Illness  Karly Green is a 69 year old female who presents for a Medicare annual wellness visit.    She has a history of hyperparathyroidism and underwent a left parathyroidectomy. Despite the surgery, her symptoms persist, and her calcium levels remain at the higher end of normal. She continues to follow up with ENT.    She experiences chronic low back pain due to disc disease and spondylolisthesis, managed with Flexeril as needed. She was previously on Lyrica, which she no longer takes. Recently, she developed tightness and pain in her right buttock, radiating down the back of her leg but not past the knee.    Her hyperlipidemia is managed with Crestor 5 mg and fish oil. She also has hypertension, managed with lisinopril and hydrochlorothiazide. No headaches, shortness of breath, vision changes, chest pain, or lower extremity swelling.    She has a history of GERD, previously managed with omeprazole, now controlled with over-the-counter medications like Tums. She also has obesity and has been seen in a weight loss clinic without much improvement. She is not currently on any medication for this.    She has prediabetes with a last A1c of 5.9% in September 2024, managed with metformin extended release 950 mg. She also has sleep apnea and uses a CPAP machine.    She has CKD stage 3, with her last labs on March 6, 2025, showing a creatinine of 1.14 and a GFR of 52, which is her baseline.    She has major depressive disorder and anxiety, managed with Wellbutrin. She reports stressors, particularly following the recent death of her mother, which has been emotionally challenging.    She has mild cognitive impairment and a  history of congenital hydrocephalus. She is following up with neurology and has been recommended for formal neuropsychological testing due to a concerning MOCA score, although a repeat test was less concerning. Her  has noted concerns at home, and she is a fall risk, having had multiple falls in the last year. She uses a walker and has been utilizing physical therapy for strength and stability.    Recent labs on March 6, 2025, showed a CBC within normal limits, CMP as noted, TSH at 2.4, vitamin B12 at 448, and parathyroid hormone at 82.7. Calcium was normal at 10.4.        Acquired spondylolisthesis      Degeneration of lumbar or lumbosacral intervertebral disc     Lumbago                Back pain has been controlled. On ibuprofen which seems to control pain. Stiffness always present. Pain worse in the morning. Takes flexeril prn. Previously on lyrica     Hyperlipidemia                 On crestor 5mg and omega 3 fatty acids     Essential hypertension, benign                 On lisinopril, hydrochlorothiazide. Denies Headaches, SOB, vision changes, chest pain and LE swelling.     GERD (gastroesophageal reflux disease)                 Previously on omeprazole. Now manages with TUMs          Obesity, Class III, BMI 40-49.9 (morbid obesity) ([facility])                 Previously followed in weight loss clinic. Not much improvement. Now trying to get back into exercise and eating healthy.      Prediabetes                 Last A1c 5.9%. On metformin.     Obstructive sleep apnea (adult)                 On CPAP.     CKD stage III                 Last Creatinine at baseline 1.14     MDD, anxiety: On wellbutrin and citalopram     MCI, congenital hydrocephalus: Follows with neurology (Faizan). Improved after CPAP use consistently  Hyperparathyroidism, Thyroid nodule: Now s/p L parathyroidectomy. Follows with ENT, Dr. Jules    Health Maintenance:  Vaccines: reviewed as below. Indicated today: Had Shingrix  Immunization  History   Administered Date(s) Administered    >=3 YRS TRI  MULTIDOSE VIAL (06937) FLU CLINIC 09/12/2017    Covid-19 Vaccine Pfizer 30 mcg/0.3 ml 03/03/2021, 03/17/2021, 10/13/2021    Covid-19 Vaccine Pfizer Bivalent 30mcg/0.3mL 09/21/2022    Covid-19 Vaccine Pfizer [place]-Sucrose 30 mcg/0.3 ml 05/14/2022    FLU VAC High Dose 65 YRS & Older PRSV Free (36618) 09/21/2022, 11/07/2023    FLUZONE 6 months and older PFS 0.5 ml (73178) 10/31/2015, 10/05/2018, 10/09/2019, 10/06/2020    Fluvirin, 3 Years & >, Im 09/13/2017    Influenza 11/01/2007, 10/09/2011, 10/22/2012, 10/16/2015, 09/27/2016, 10/09/2019, 10/06/2020    Influenza(Afluria)0.5ml QIV PFS 10/06/2018    Pfizer Covid-19 Vaccine 30mcg/0.3ml 12yrs+ 10/12/2023    Pneumococcal Conjugate PCV20 10/27/2022    Pneumovax 23 10/21/2021    TDAP 12/15/2018     Obesity screening: Body mass index is 43.75 kg/m².  Diabetes screening:    Chemistry Labs:   HEMOGLOBIN A1c (% of total Hgb)   Date Value   08/24/2015 6.6 (H)   01/28/2014 5.8 (H)     HgbA1C (%)   Date Value   09/19/2024 5.9 (H)   04/17/2023 5.9 (H)   10/21/2021 6.0 (H)         Hypercholesterolemia screening:  Lab Results   Component Value Date    HDL 78 (H) 09/19/2024    HDL 69 (H) 06/26/2023    HDL 66 (H) 04/17/2023     Lab Results   Component Value Date    LDL 88 09/19/2024     (H) 06/26/2023     (H) 04/17/2023     Lab Results   Component Value Date    TRIG 84 09/19/2024    TRIG 125 06/26/2023    TRIG 100 04/17/2023        Depression screen:     Depression Screening (PHQ-2/PHQ-9): Over the LAST 2 WEEKS   Little interest or pleasure in doing things: Several days    Feeling down, depressed, or hopeless: Several days    PHQ-2 SCORE: 2   1. Little interest or pleasure in doing things: Several days  2. Feeling down, depressed, or hopeless: Several days  3. Trouble falling or staying asleep, or sleeping too much: Several days  4. Feeling tired or having little energy: Several days  5. Poor appetite or  overeating: Not at all  6. Feeling bad about yourself - or that you are a failure or have let yourself or your family down: Several days  7. Trouble concentrating on things, such as reading the newspaper or watching television: Not at all  8. Moving or speaking so slowly that other people could have noticed. Or the opposite - being so fidgety or restless that you have been moving around a lot more than usual: Not at all  9. Thoughts that you would be better off dead, or of hurting yourself in some way: Not at all  PHQ-9 TOTAL SCORE: 5  If you checked off any problems, how difficult have these problems made it for you to do your work, take care of things at home, or get along with other people?:  (no responce)      Cervical Cancer screening: Last Pap: -     Colon Cancer screening: Last screenin2021 - 7 years  Breast Cancer screening: Last mammogram: 2024  Osteoporosis: Last DEXA: 2024       History/Other:   Fall Risk Assessment:   She has been screened for Falls and is High Risk. Fall Prevention information provided to patient in After Visit Summary.    Do you feel unsteady when standing or walking?: Yes  Do you worry about falling?: Yes  Have you fallen in the past year?: Yes  How many times have you fallen?: 2  Were you injured?: No     Cognitive Assessment:   Abnormal  What day of the week is this?: Incorrect  What month is it?: Correct  What year is it?: Correct  Recall \"Ball\": Correct  Recall \"Flag\": Incorrect  Recall \"Tree\": Correct    Functional Ability/Status:   Karly Green has some abnormal functions as listed below:  She has Walking problems based on screening of functional status. She has problems with Memory based on screening of functional status.       Depression Screening (PHQ):  PHQ-9 TOTAL SCORE: 5  , done 2025   Little interest or pleasure in doing things: 1    Feeling down, depressed, or hopeless: 1    Trouble falling or staying asleep, or sleeping too much: 1      Feeling tired or having little energy: 1    Feeling bad about yourself - or that you are a failure or have let yourself or your family down: 1    If you checked off any problems, how difficult have these problems made it for you to do your work, take care of things at home, or get along with other people?: -- (no responce)            Advanced Directives:   She does have a Living Will but we do NOT have it on file in Epic.    She does have a POA but we do NOT have it on file in Epic.    Discussed Advance Care Planning with patient (and family/surrogate if present). Standard forms made available to patient in After Visit Summary.      Problem List[1]  Allergies:  She is allergic to adhesive tape, menthol, and seasonal.    Current Medications:  Active Meds, Sig Only[2]    Medical History:  She  has a past medical history of Anxiety state, Arthritis, Back pain, Back problem, Colon polyp (01/04/2020), Depression, Disorder of thyroid, Esophageal reflux, H/O total knee replacement (06/19/2013), Heartburn, Hemorrhoids, HIGH BLOOD PRESSURE, High cholesterol, History of adenomatous polyp of colon (12/06/2021), Impacted cerumen, Indigestion, Obesity, NAYLA (obstructive sleep apnea) (PSG 10-01-15), Osteoarthrosis, unspecified whether generalized or localized, unspecified site, Other and unspecified hyperlipidemia, PONV (postoperative nausea and vomiting), Prediabetes, Renal disorder, Sleep apnea, Unspecified essential hypertension, Visual impairment, and Wears glasses.  Surgical History:  She  has a past surgical history that includes other (7/11/2016); spinal fusion (2017); excis lumbar disk,one level; back surgery; knee replacement surgery (8/2007); and knee replacement surgery (8/2013).   Family History:  Her family history includes Breast Cancer (age of onset: 50) in her sister; Cancer in her sister; Diabetes in her father; Heart Disease in her father and mother; NAYLA in her brother; Other in her father.  Social  History:  She  reports that she has never smoked. She has never used smokeless tobacco. She reports that she does not drink alcohol and does not use drugs.    Tobacco:  She has never smoked tobacco.    CAGE Alcohol Screen:   CAGE screening score of 0 on 6/6/2025, showing low risk of alcohol abuse.      Patient Care Team:  Kay Villalpando DO as PCP - General (Family Medicine)  Alexus Joseph APRN (Family Practice)  Praful Norris PA-C (Physician Assistant)  Praful Hernandez MD as Consulting Physician (NEUROSURGERY)  Ok Cavazos PA as Consulting Physician (Physician Assistant)  Rubin Avalos RD (Dietitian)  Jaison Bravo MD as Consulting Physician (NEUROSURGERY)  Sasha Frias DO as Consulting Physician (NEUROLOGY)  Audra Bone APRN (Nurse Practitioner Family)  Bridget Gloria MD as Consulting Physician (NEUROLOGY)  Nita Mcguire PT as Physical Therapist (Physical Therapy)    Review of Systems     Negative except above    Objective:   Physical Exam  General Appearance:  Alert, cooperative, no distress, appears stated age   Head:  Normocephalic, without obvious abnormality, atraumatic   Eyes:  conjunctiva/corneas clear, EOM's intact both eyes   Ears:  Normal TM's and external ear canals, both ears   Throat: Lips, mucosa, and tongue normal; teeth and gums normal   Neck: Supple, symmetrical, trachea midline, no adenopathy;  thyroid: not enlarged, symmetric, no tenderness/mass/nodules; no carotid bruit or JVD   Lungs:   Clear to auscultation bilaterally, respirations unlabored   Heart:  Regular rate and rhythm, S1 and S2 normal, no murmur, rub, or gallop   Abdomen:   Soft, non-tender, bowel sounds active all four quadrants,  no masses, no organomegaly   Pelvic: Deferred   Extremities: Extremities normal, atraumatic, no cyanosis or edema   Neurologic: Grossly Normal       /75   Pulse 99   Resp 14   Ht 5' 5.75\" (1.67 m)   Wt 269 lb (122 kg)   LMP 07/01/2007   SpO2 99%   BMI  43.75 kg/m²  Estimated body mass index is 43.75 kg/m² as calculated from the following:    Height as of this encounter: 5' 5.75\" (1.67 m).    Weight as of this encounter: 269 lb (122 kg).    Medicare Hearing Assessment:   Hearing Screening    Screening Method: Finger Rub  Finger Rub Result: Pass         Visual Acuity:   Right Eye Visual Acuity: Uncorrected Right Eye Chart Acuity: 20/25   Left Eye Visual Acuity: Uncorrected Left Eye Chart Acuity: 20/25   Both Eyes Visual Acuity: Uncorrected Both Eyes Chart Acuity: 20/25   Able To Tolerate Visual Acuity: Yes        Assessment & Plan:   Karly Green is a 69 year old female who presents for a Medicare Assessment.     1. Encounter for annual health examination (Primary)  2. Visit for screening mammogram  -     3D Mammogram Digital Screen, Bilateral (CPT=77067/75793); Future; Expected date: 06/06/2025  3. Stage 3a chronic kidney disease (HCC)  4. Hydrocephalus, unspecified type (HCC)  5. Class 3 severe obesity without serious comorbidity with body mass index (BMI) of 40.0 to 44.9 in adult  6. Current moderate episode of major depressive disorder without prior episode (HCC)  7. Hyperlipidemia, unspecified hyperlipidemia type  8. Essential hypertension  9. Prediabetes  10. Hyperparathyroidism (HCC)  11. MCI (mild cognitive impairment)  12. Chronic bilateral low back pain with bilateral sciatica  13. At high risk for falls  14. Gastroesophageal reflux disease, unspecified whether esophagitis present    Assessment & Plan  Chronic Low Back Pain with Sciatica  Chronic low back pain with disc disease and spondylolisthesis. Recent onset of right buttock pain radiating down the leg, suggestive of sciatica. Previously on Lyrica, which was discontinued. Recent physical therapy completed. Discussed potential benefits of restarting Lyrica for nerve pain management.  - Advise stretching exercises and acetaminophen for pain management.  - Consider icing the affected area.  -  Monitor symptoms and consider restarting Lyrica if pain persists.  - Consider referral to a back specialist if symptoms do not improve.    Major Depressive Disorder and Anxiety  Major depressive disorder and anxiety managed with bupropion. Recent stressors include the death of her mother. Mood is stable but acknowledges grief process. Discussed the normalcy of grief and potential benefits of grief counseling.  - Continue bupropion therapy.  - Encourage grief counseling or support groups if needed.    Mild Cognitive Impairment  Mild cognitive impairment with congenital hydrocephalus. Mixed reports on memory function. Recent MOCA score concerning but not confirmed by neurology. Scheduled for formal neuropsychological testing. Discussed potential outcomes of testing and possible interventions if cognitive impairment is confirmed.  - Proceed with scheduled neuropsychological testing.  - Discuss potential treatment options based on test results.    Fall Risk  Known fall risk with multiple falls in the past year. Currently using a walker and recently completed physical therapy for strength and stability.  - Continue using walker for mobility support.  - Consider further physical therapy if needed.    Hyperparathyroidism (Post-Parathyroidectomy)  Post left parathyroidectomy with ENT follow-up. Parathyroid hormone levels remain on the high side of normal, indicating possible residual hyperactivity. Calcium levels are at the higher end of normal. Surgery reduced calcium levels to non-dangerous levels but did not fully resolve hyperparathyroidism.  - Continue monitoring parathyroid hormone and calcium levels.  - Follow up with ENT for further evaluation.    Hypertension  Hypertension managed with lisinopril and hydrochlorothiazide. Blood pressure slightly elevated at the visit but typically stable at home. Discussed the importance of regular home monitoring to ensure stability.  - Continue current antihypertensive regimen.  -  Encourage regular home blood pressure monitoring.    Chronic Kidney Disease Stage 3  CKD stage 3 with baseline creatinine of 1.14 and GFR of 52. Discussed the role of hydration and potential contributing factors such as medications and hypertension.  - Ensure adequate hydration to prevent further kidney damage.  - Continue monitoring renal function.    Hyperlipidemia  Hyperlipidemia managed with rosuvastatin and fish oil.  - Continue current lipid-lowering therapy.    Prediabetes  Prediabetes with last A1c of 5.9% in September 2024. Currently on metformin extended release 950 mg.  - Continue metformin therapy.  - Monitor blood glucose levels regularly.    Obesity  Obesity with previous weight loss clinic attendance without significant improvement. No current medication for weight management.  - Encourage lifestyle modifications for weight management.    GERD  GERD previously managed with omeprazole, now controlled with over-the-counter medications including calcium carbonate.  - Continue current management with over-the-counter medications as needed.    General Health Maintenance  Routine health maintenance discussed. Shingrix vaccine status confirmed. Mammogram due in August. Colon cancer screening not due until 2028.  - Schedule mammogram for August.  - Continue routine health maintenance and screenings.    Follow-up  Follow-up plans discussed for ongoing management of conditions and upcoming tests.  - Schedule follow-up appointment in three months to review neuropsychological testing results and overall health status.    Recording duration: 28 minutes  The patient indicates understanding of these issues and agrees to the plan.  Continue with current treatment plan.  Reinforced healthy diet, lifestyle, and exercise.    RTC in 3 months.    Kay Villalpando DO, 6/6/2025     Supplementary Documentation:   General Health:  In the past six months, have you lost more than 10 pounds without trying?: 2 - No  Has your  appetite been poor?: No  Type of Diet: Balanced  How does the patient maintain a good energy level?: Other  How would you describe your daily physical activity?: Light  How would you describe your current health state?: Good  How do you maintain positive mental well-being?: Visiting Family, Social Interaction, Puzzles, Games, Visiting Friends  On a scale of 0 to 10, with 0 being no pain and 10 being severe pain, what is your pain level?: 5 - (Moderate)  In the past six months, have you experienced urine leakage?: 1-Yes  At any time do you feel concerned for the safety/well-being of yourself and/or your children, in your home or elsewhere?: No  Have you had any immunizations at another office such as Influenza, Hepatitis B, Tetanus, or Pneumococcal?: No    Health Maintenance   Topic Date Due    Zoster Vaccines (1 of 2) Never done    COVID-19 Vaccine (7 - 2024-25 season) 09/01/2024    Annual Well Visit  01/01/2025    Fall Risk Screening (Annual)  01/01/2025    Mammogram  08/22/2025    Influenza Vaccine (Season Ended) 10/01/2025    Colorectal Cancer Screening  12/06/2028    DEXA Scan  Completed    Annual Depression Screening  Completed    Pneumococcal Vaccine: 50+ Years  Completed    Meningococcal B Vaccine  Aged Out            [1]   Patient Active Problem List  Diagnosis    Hyperlipidemia    Prediabetes    Lumbar stenosis    Essential hypertension    NAYLA (obstructive sleep apnea)    Gastroesophageal reflux disease    Bilateral lumbar radiculopathy    Hydrocephalus, unspecified type ([facility])    Class 3 severe obesity without serious comorbidity with body mass index (BMI) of 40.0 to 44.9 in adult    Current moderate episode of major depressive disorder without prior episode ([facility])    CKD (chronic kidney disease) stage 3, GFR 30-59 ml/min ([facility])   [2]   Outpatient Medications Marked as Taking for the 6/6/25 encounter (Office Visit) with Kay Villalpando, DO   Medication Sig    CITALOPRAM 20 MG Oral Tab TAKE ONE TABLET BY MOUTH  ONE TIME DAILY    HYDROCHLOROTHIAZIDE 25 MG Oral Tab TAKE ONE TABLET BY MOUTH ONE TIME DAILY    LISINOPRIL 40 MG Oral Tab TAKE ONE TABLET BY MOUTH ONE TIME DAILY    ROSUVASTATIN 5 MG Oral Tab TAKE ONE TABLET BY MOUTH NIGHTLY    metFORMIN  MG Oral Tablet 24 Hr TAKE ONE TABLET BY MOUTH ONE TIME DAILY WITH BREAKFAST    RESTASIS 0.05 % Ophthalmic Emulsion Place 1 drop into both eyes every 12 (twelve) hours.    pregabalin 25 MG Oral Cap Take 1 capsule (25 mg total) by mouth 2 (two) times daily.    BUPROPION  MG Oral Tablet 24 Hr TAKE ONE TABLET BY MOUTH ONE TIME DAILY    latanoprost 0.005 % Ophthalmic Solution Place 1 drop into both eyes nightly.    Magnesium-Calcium-Folic Acid 400-200-1 MG Oral Tab Take 400 mg by mouth daily.    Multiple Vitamins-Minerals (MULTIPLE VITAMINS/WOMENS OR) Take 1 tablet by mouth daily.      Ascorbic Acid (VITAMIN C) 250 MG Oral Tab Take 1 tablet (250 mg total) by mouth daily.

## 2025-06-26 ENCOUNTER — TELEPHONE (OUTPATIENT)
Dept: ADMINISTRATIVE | Age: 70
End: 2025-06-26

## 2025-06-26 DIAGNOSIS — Z09 FOLLOW-UP EXAM, MORE THAN 1 YEAR SINCE PREVIOUS EXAM: Primary | ICD-10-CM

## 2025-06-26 DIAGNOSIS — Z01.00 ENCOUNTER FOR COMPLETE EYE EXAM: ICD-10-CM

## 2025-06-26 NOTE — TELEPHONE ENCOUNTER
Patient request  Referral  to see Ophthalmology(Regan Cooper)please review and sign plan and care if you agree Thank you.                              Demi STACK            [facility].

## 2025-07-08 ENCOUNTER — TELEPHONE (OUTPATIENT)
Dept: FAMILY MEDICINE CLINIC | Facility: CLINIC | Age: 70
End: 2025-07-08

## 2025-07-25 ENCOUNTER — MED MANAGEMENT (OUTPATIENT)
Age: 70
End: 2025-07-25

## (undated) DIAGNOSIS — F32.1 CURRENT MODERATE EPISODE OF MAJOR DEPRESSIVE DISORDER WITHOUT PRIOR EPISODE (HCC): ICD-10-CM

## (undated) DIAGNOSIS — E66.01 OBESITY, CLASS III, BMI 40-49.9 (MORBID OBESITY) (HCC): ICD-10-CM

## (undated) DEVICE — SPONGE: SPECIALTY PEANUT XR 100/CS: Brand: MEDICAL ACTION INDUSTRIES

## (undated) DEVICE — ANTIBACTERIAL UNDYED BRAIDED (POLYGLACTIN 910), SYNTHETIC ABSORBABLE SUTURE: Brand: COATED VICRYL

## (undated) DEVICE — YANKAUER,FLEXIBLE HANDLE,FINE CAPACITY: Brand: MEDLINE

## (undated) DEVICE — 3M™ STERI-STRIP™ REINFORCED ADHESIVE SKIN CLOSURES, R1547, 1/2 IN X 4 IN (12 MM X 100 MM), 6 STRIPS/ENVELOPE: Brand: 3M™ STERI-STRIP™

## (undated) DEVICE — CLIP LIG M BLU TI HRT SHP WIRE HORZ

## (undated) DEVICE — HOOK RETRCT BLDE L5MM E STAY BLNT LONE STAR

## (undated) DEVICE — POWDER HEMSTAT 3GM OXIDIZED REGENERATED CELOS

## (undated) DEVICE — PAD SACRAL SPAN AID

## (undated) DEVICE — SUT MCRYL 4-0 18IN PS-2 ABSRB UD 19MM 3/8 CIR

## (undated) DEVICE — LIGACLIP EXTRA LIGATING CLIP CARTRIDGES: 6 TITANIUM CLIPS/ CARTRIDGE (SMALL): Brand: LIGACLIP

## (undated) DEVICE — CABLE BPLR L12FT FLYING LD DISP

## (undated) DEVICE — PROBE 8225101 5PK STD PRASS FL TIP ROHS

## (undated) DEVICE — SOLUTION IRRIG 1000ML 0.9% NACL USP BTL

## (undated) DEVICE — SLEEVE COMPR MD KNEE LEN SGL USE KENDALL SCD

## (undated) DEVICE — STERILE SYNTHETIC POLYISOPRENE POWDER-FREE SURGICAL GLOVES WITH HYDROGEL COATING, SMOOTH FINISH, STRAIGHT FINGER: Brand: PROTEXIS

## (undated) DEVICE — APPLICATOR PREP 10.5ML ORNG CHG 2% ISO ALC

## (undated) DEVICE — PACK DRAPE HEAD/NECK STER

## (undated) DEVICE — ELECTRODE ES 2.75IN PTFE BLDE MOD E-Z CLN

## (undated) NOTE — LETTER
Patient Name: Karly Green  YOB: 1955          MRN :  [id number]  Date:  5/21/2025  Referring Physician:  Kay Villalpando    Discharge Summary  Pt has attended 14 visits in Physical Therapy.     Today's Date   5/21/2025    Subjective  The patient reports taht her back is hurting today, it is aching. she states that after her mom passed away she had a knot in her back and it has moved lower. It is a constant ache, she is not sure if it gets worse with certain movements and activities.       Pain: 4/10     Objective              TUG 37 sec with RW, 2nd time 30 sec    Romberg EO: level surface 30 sec; compliant surface 0 sec (NT)     Romberg EC: level surface 0 sec (NT); compliant surface 0 sec (NT)  Tandem Stance: R back: 6 sec (was 3 sec), L back 11 sec) (half tandem 26 sec R in back, 30 sec L in back (was 25 sec)); L back:  ; Fall Risk: Yes  SLS: R: 0 sec (NT); L: 0 sec (NT); Fall Risk: Yes    Ambulation with SP cane: unsteadiness is noted, pt requires hand hold with PT in addition to cane. She demonstrates difficulty turning and wide ELVIS with ambulation.      Assessment  The patient has demonstrated improvements in static and dynamic balance testing since the initial session. She demonstrates safe gait mechanics with use of a rolling walker. We assesed gait and safety with ambulation with use of a single point cane today, and the patient required additional PT assist with hand hold in addition to the cane. The patient at this time will continue on with home exercises as she has met nearly all of her physical therapy goals. Recommended that Karly continue with exercise and balance exercises at home and consider joining balance classes. Offered referral to the medical fitness program.    Goals (to be met in 14 visits)      Not Met Progress Toward Partially Met Met   Pt will demonstrate improved tandem stance to >5 seconds ASHLEE to promote safety and decrease risk of falls on uneven surfaces such as  grass and gravel. [] [] [] [x]   Pt will perform TUG in <60 seconds with least restrictive AD, demonstrating improved gait speed for community ambulation.  UPDATE: Pt will perform TUG with least restrictive AD in <35 seconds [] [] [] [x]   Pt will demonstrate safety when ambulating in the clinic 100% of the time [] [] [] [x]   Pt will be able to perform sit<>stand with little difficulty [] [] [x] []   Pt will improve functional hip strength to demonstrate ability to ascend/descend 1 flight of stairs reciprocally with use of handrail. [] [] [] [x]   Pt will be independent and compliant with comprehensive HEP to maintain progress achieved in PT. [] [] [] [x]       FES Score  Score: (Patient-Rptd) 48.44 % (2/19/2025  6:30 PM)    Score and level of concern: (Patient-Rptd) 31 - high concern (2/19/2025  6:30 PM)        Post FES Score  Post Score: (Patient-Rptd) 51.56 % (5/21/2025  2:46 PM)    Score and level of concern (post): (Patient-Rptd) 33 - high concern (5/21/2025  2:46 PM)    -3.12 % improvement    Plan: The patient will be discharged from this plan of care for physical therapy at this time.      Patient/Family/Caregiver was advised of these findings, precautions, and treatment options and has agreed to actively participate in planning and for this course of care.    Thank you for your referral. If you have any questions, please contact me at Dept: [phone].    Sincerely,  Electronically signed by therapist: Nita Mcguire, PT       Certification From: 5/21/2025  To:8/19/2025              21st Century Cures Act Notice to Patient: Medical documents like this are made available to patients in the interest of transparency. However, be advised this is a medical document and it is intended as [id number] communication between your medical providers. This medical document may contain abbreviations, assessments, medical data, and results or other terms that are unfamiliar. Medical documents are intended to carry  relevant information, facts as evident, and the clinical opinion of the practitioner. As such, this medical document may be written in language that appears blunt or direct. You are encouraged to contact your medical provider and/or [facility] Patient Experience if you have any questions about this medical document.

## (undated) NOTE — LETTER
OUTSIDE TESTING RESULT REQUEST     IMPORTANT: FOR YOUR IMMEDIATE ATTENTION  Please FAX all test results listed below to: [phone]     Testing already done on or about: 24     * * * * If testing is NOT complete, arrange with patient A.S.A.P. * * * *      Patient Name: Karly Green  Surgery Date: 2024  Medical Record: [id number]  CSN: [id number]  : 10/3/1955 - A: 68 y     Sex: female  Surgeon(s):  Teto Jules MD  Procedure: BILATERAL PARATHYROIDECTOMY  Anesthesia Type: General     Surgeon: Teto Jules MD     The following Testing and Time Line are REQUIRED PER ANESTHESIA     EKG READ AND SIGNED WITHIN   90 days      Thank You,   Sent by:SUKUMAR TRINIDAD

## (undated) NOTE — MR AVS SNAPSHOT
[address], [address] [place] [address] [phone]               Thank you for choosing us for your health care visit with Breann Pierson MD.  We are glad to serve you and happy to provide you with th ? Contact your pharmacy at least 5 days prior to running out of medication and have them send an electronic request or submit request through the “request refill” option in your [employer] account. ?  Refills are not addressed on weekends; covering physicians testing performed. [facility]) will contact your insurance carrier to obtain pre-certification or prior authorization.     Unfortunately, [facility] has seen an increase in denial of payment even though the procedure/test has been pre-cert Take 1 Package by mouth daily. Lisinopril-Hydrochlorothiazide 10-12.5 MG Tabs   Take 1 tablet by mouth once daily. Meloxicam 15 MG Tabs   Take 1 tablet (15 mg total) by mouth daily.            MetFORMIN HCl 1000 MG Tabs   Take 750 mg by Make half your plate fruits and vegetables Highly refined, white starches including white bread, rice and pasta   Eat plenty of protein, keep the fat content low Sugars:  sodas and sports drinks, candies and desserts   Eat plenty of low-fat dairy products

## (undated) NOTE — LETTER
Patient Name: Tory Alvarez  YOB: 1955          MRN number:  [id number]  Date:  12/28/2017  Referring Physician:  Brittani Levy     Progress Summary    Pt has attended 9 visits in Physical Therapy.      Subjective: fell at home on [place] Plan: Pt scheduled to see physician next week. Requesting further assessment of neurological issues vs apprehension re: falls in continued balance and coordination deficits.     Patient/Family/Caregiver was advised of these findings, precautions, and treatm

## (undated) NOTE — LETTER
Patient Name: Ivan Magaña  YOB: 1955          MRN number:  [id number]  Date:  12/28/2017  Referring Physician:  Valeri Otero

## (undated) NOTE — MR AVS SNAPSHOT
[address] 50 [phone]               Thank you for choosing us for your health care visit with YAN Murcia.   We are glad to serve you and happy to provide you with this summary Obesity, Class III, BMI 40-49.9 (morbid obesity)      Instructions and Information about Your Health      Stress Relief: Relaxation  Focusing the mind helps provide stress relief.  Taking 5 to 10 minutes to practice relaxation each day helps you feel more © 2594-3089 [address], [address]. All rights reserved. This information is not intended as a substitute for professional medical care. Always follow your healthcare professional's instructions.              Al Take 1 tablet (15 mg total) by mouth daily. MetFORMIN HCl  MG Tb24   Take 2 tablets (1,500 mg total) by mouth daily with breakfast.   Commonly known as:  GLUCOPHAGE-XR           MULTIPLE VITAMINS/WOMENS OR   Take 1 tablet by mouth daily.

## (undated) NOTE — Clinical Note
2017        RE: Steph Stanley     : 10/3/1955    Dear Dr. Casimiro Welsh    This letter is to inform you that your patient is being scheduled for surgery with Dr. Radha Pompa on 17 at [facility]    Diagnosis: Degeneration of lumbar or lumbosacr

## (undated) NOTE — MR AVS SNAPSHOT
[facility], [place] [address] [facility], [address] [place] [address] [phone]               Thank you for choosing us for your health care visit with Ulysses Owens MD.  We are glad to serve you and happy to provide you with this ? Contact your pharmacy at least 5 days prior to running out of medication and have them send an electronic request or submit request through the “request refill” option in your [employer] account. ?  Refills are not addressed on weekends; covering physicians testing performed. [facility]) will contact your insurance carrier to obtain pre-certification or prior authorization.     Unfortunately, [facility] has seen an increase in denial of payment even though the procedure/test has been pre-cert Commonly known as:  cyclobenzaprine           DULoxetine HCl 60 MG Cpep   Take 1 capsule (60 mg total) by mouth once daily. Commonly known as:  CYMBALTA           Fish Oil 1200 MG Caps   Take 1 capsule by mouth daily.            Fluticasone Propionate 50

## (undated) NOTE — LETTER
11/29/17    Dr. Kirkpatrick Cleverly,     I had the pleasure of seeing your patient, Christina Ball, in clinic. 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 She prefers to follow-up with her prior neurosurgeon who performed her lumbar fusion.

## (undated) NOTE — MR AVS SNAPSHOT
[address] [phone]               Thank you for choosing us for your health care visit with Juwan Paige RD.   We are glad to serve you and happy to provide you with this summary of This list is accurate as of: 6/14/17 12:54 PM.  Always use your most recent med list.                ascorbic acid (VITAMIN C) 250 MG Tabs   Take 250 mg by mouth daily.            BuPROPion HCl ER (XL) 150 MG Tb24   Take 1 tablet (150 mg total) by mouth carmen You can access your MyChart to more actively manage your health care and view more details from this visit by going to [email or web address].   If you've recently had a stay at the Hospital you can access your discharge instructions in [address] by jorge a

## (undated) NOTE — MR AVS SNAPSHOT
[address] 89 [phone]               Thank you for choosing us for your health care visit with YAN Schulte.   We are glad to serve you and happy to provide you with this summary · Take medicines as directed. This helps keep pain under control. Always read labels, and call your healthcare provider or pharmacist if you have any questions. Walk each day  A daily walk keeps your back and thigh muscles stretched and strong.  This gives Take 650 mg by mouth every 6 (six) hours as needed for Pain. Commonly known as:  TYLENOL           Albuterol Sulfate  (90 BASE) MCG/ACT Aers   Inhale 2 puffs into the lungs every 4 (four) hours as needed for Wheezing or Shortness of Breath.    Comm [address], [facility], [place] [phone]     Phone:  [phone]    - BuPROPion HCl 100 MG Tabs  - MetFORMIN HCl  MG Tb24  - topiramate 50 MG Tabs      You can get these medications from any pharmacy     Bring

## (undated) NOTE — Clinical Note
Toño Donovan is seeing you on 2/17/17. I advised increasing her Metformin XR, adding GLP1 or SGLT2 agent for prediabetes. She plans to discuss with you at appointment first. I have not made any medication adjustments.  I encouraged her to consider a perso

## (undated) NOTE — MR AVS SNAPSHOT
[address] 25 [phone]               Thank you for choosing us for your health care visit with YAN Russo.   We are glad to serve you and happy to provide you with this summary Instructions and Information about Your Health      Stress Relief: Changing Your Response  You are the only person responsible for your thoughts and actions. This simple idea is your most powerful tool for managing stress.  Start by having zane Jarquin BP Pulse Height Weight BMI    132/82 mmHg 96 67\" 250 lb 39.15 kg/m2         Current Medications          This list is accurate as of: 6/13/17 11:30 AM.  Always use your most recent med list.                ascorbic acid (VITAMIN C) 250 MG Tabs   Take 250 Commonly known as:  TROKENDI XR           Vitamin D 1000 units Caps   Take 2 Caps by mouth daily. VSL#3 Caps   Take 1 capsule by mouth daily. * Notice:   This list has 2 medication(s) that are the same as other medications prescribed for

## (undated) NOTE — MR AVS SNAPSHOT
[address] 58 [phone]               Thank you for choosing us for your health care visit with YAN Greenfield.   We are glad to serve you and happy to provide you with this summary Obesity, Class III, BMI 40-49.9 (morbid obesity)      Instructions and Information about Your Health    Stop topiramate and buproprion 100mg   Exercise: Measuring Your Pace  Getting your heart to work at the right pace is important.  It means you’ll develo 35  185   40  180   45  175   50  170   55  165   60  160   Date Last Reviewed: 8/13/2015  © 0974-0495 The [address] [place] [address] [place]. All rights reserved.  This information is not in Take 2 tablets (1,500 mg total) by mouth daily with breakfast.   Commonly known as:  GLUCOPHAGE-XR           MULTIPLE VITAMINS/WOMENS OR   Take 1 tablet by mouth daily. omeprazole 20 MG Cpdr   Take 1 capsule (20 mg total) by mouth once daily.    C

## (undated) NOTE — LETTER
Patient Name: Raji Phelps  YOB: 1955          MRN number:  [id number]  Date:  1/18/2018  Referring Physician:  Dominik Butt     Discharge Summary    Pt has attended 16 visits in Physical Therapy.      Subjective: saw neurosurgeon and wi Electronically signed by therapist: Franck Marvin

## (undated) NOTE — MR AVS SNAPSHOT
[address] [phone]               Thank you for choosing us for your health care visit with Juwan Paige RD.   We are glad to serve you and happy to provide you with this summary of injection, you need to stop breastfeeding for 24-48 hours after the procedure. IF A CHILD is scheduled for this procedure, parents should be advised that they will not be able to accompany the child in the testing room.  Parents will remain in the MRI w Commonly known as:  WELLBUTRIN XL           CALTRATE 600 OR   Take 1 tablet by mouth daily.            clotrimazole-betamethasone 1-0.05 % Crea   Apply as needed   Commonly known as:  LOTRISONE           * Cyclobenzaprine HCl 10 MG Tabs   TAKE 1 TABLET (10 medications prescribed for you. Read the directions carefully, and ask your doctor or other care provider to review them with you.             MyChart     Visit [employer]  You can access your MyChart to more actively manage your health care and view more deta

## (undated) NOTE — LETTER
Date: 8/13/2022    Patient Name: Emy Erickson          To Whom it may concern: This letter has been written at the patient's request. The above patient is under my care. This patient has several medical conditions including some memory changes and abnormal gait. This makes it difficult for her to drive long distances, use public transportation or walk long distances. Please consider excusing her from jury duty for these reasons.         Sincerely,        Noah Chavez, DO

## (undated) NOTE — MR AVS SNAPSHOT
[address] 26 [phone]               Thank you for choosing us for your health care visit with YAN La.   We are glad to serve you and happy to provide you with this summary · Finances. If you manage your blood sugar, you may spend less on medical care. 2 types of exercise  Two types of exercise help your body use blood sugar.  Experts advise both types of exercise for people with diabetes:  · Aerobic exercise. This is a rhyth [facility])    [address], [address] [facility]   [phone]            Mar 23, 2017 10:00 AM   Exam - New Patient with Saint Ammons, RD   [facility])    [address] [facility] Fish Oil 1200 MG Caps   Take 1 capsule by mouth daily. Fluticasone Propionate 50 MCG/ACT Susp   2 sprays by Nasal route daily.    What changed:    - when to take this  - reasons to take this   Commonly known as:  FLONASE           FOS Powd   Take Call [phone] for help. [employer] is NOT to be used for urgent needs. For medical emergencies, dial 911.            Visit [facility] online at  [email or web address]

## (undated) NOTE — LETTER
Patient Name: Katya Arcos  YOB: 1955          MRN number:  [id number]  Date:  11/16/2017  Referring Physician:  Tereso Gutiérrez          Physical Therapy  EVALUATION:    Referring Physician: Dr. Tung Elam  Diagnosis: LBP  , lumbar radiculopa Extension: 10- not tested further due to balance issues  Trunk rotation in hooklying wfl      Palpation: no acute tenderness to palpation   Pt inaccurate in light touch to dorsum and plantar surface of toes L.  Improved w/ repetition    Strength: isolated h visits over a 90 day period. Treatment will include: Manual Therapy; Therapeutic Exercises; Neuromuscular Re-education; Therapeutic Activity;   Pt education; Home exercise program instruction; and further assessment on balance master  Education or treatment

## (undated) NOTE — LETTER
Patient Name: Oliva Carrel  YOB: 1955          MRN number:  [id number]  Date:  12/14/2017  Referring Physician:  Kassy Ngo     Progress Summary    Pt has attended 8 visits in Physical Therapy.      Subjective: I don't think I'm ready limited, or restricted  Projected G Code:  Mobility: Walking and Moving Around CJ: 20-39% impaired, limited, or restricted    Rehab Potential: fair    Plan: Requesting orders to continue skilled Physical Therapy 2 x/week or a total of 8 additional visits ov

## (undated) NOTE — MR AVS SNAPSHOT
[facility], [place] [address] [facility], [address] [place] [address] [phone]               Thank you for choosing us for your health care visit with Tessa Lacey MD.  We are glad to serve you and happy to provide you with this [M51.37], Status post lumbar spine surgery for decompression of spinal cord [Z98.890], Chronic left-sided low back pain with left-sided sciatica [M54.42, G89.29]           XR LUMBAR SPINE COMPLETE W/FLEX + EXT (CPT=72114)    Complete by:   May 04, 2017 (Joe Order:   Op Referral To Pain Management          Referral Orders      Normal Orders This Visit    OP REFERRAL TO PAIN MANAGEMENT [[id number] CUSTOM]  Order #:  [id number]     Assoc Dx:  Degeneration of lumbar or lumbosacral intervertebral disc [M51.37], Statu If your physician has ordered radiology tests such as MRI or CT scans, do not schedule the test until this office has notified you that the test has been approved by your insurer. Depending on your insurance carrier, approval may take 3-10 days.  It is hig Take 250 mg by mouth daily. BuPROPion HCl ER (XL) 150 MG Tb24   Take 1 tablet (150 mg total) by mouth daily. Commonly known as:  WELLBUTRIN XL           CALTRATE 600 OR   Take 1 tablet by mouth daily.            clotrimazole-betamethasone 1-0.05 Red Yeast Rice 600 MG Caps   Take 1 capsule by mouth daily. Topiramate ER 50 MG Cp24   Take 50 mg by mouth every morning. Commonly known as:  TROKENDI XR           Vitamin D 1000 units Caps   Take 2 Caps by mouth daily.            VSL#3 Caps

## (undated) NOTE — MR AVS SNAPSHOT
[address] [phone]               Thank you for choosing us for your health care visit with Tex Guzman RD.   We are glad to serve you and happy to provide you with this summary of This list is accurate as of: 3/23/17 11:15 AM.  Always use your most recent med list.                acetaminophen 325 MG Tabs   Take 650 mg by mouth every 6 (six) hours as needed for Pain.    Commonly known as:  TYLENOL           Albuterol Sulfate  Vitamin D 1000 units Caps   Take 2 Caps by mouth daily. VSL#3 Caps   Take 1 capsule by mouth daily.                    MyChart     Visit MyChart  You can access your MyChart to more actively manage your health care and view more details from this

## (undated) NOTE — MR AVS SNAPSHOT
[address] 83 [phone]               Thank you for choosing us for your health care visit with YAN Richard.   We are glad to serve you and happy to provide you with this summary Instructions and Information about Your Health    Will have Alia Rides Ladarius call you as with a patient currently. Keep up the good work and exercising.        Stress Relief: Activities  When you're feeling stressed, some simple exercises can provide Today's Vital Signs     BP Pulse Height Weight BMI    132/84 mmHg 80 67\" 253 lb 39.62 kg/m2         Current Medications          This list is accurate as of: 4/13/17  3:14 PM.  Always use your most recent med list.                acetaminophen 325 MG Tabs Phentermine HCl 15 MG Caps   Take 1 capsule (15 mg total) by mouth every morning. Red Yeast Rice 600 MG Caps   Take 1 capsule by mouth daily. Topiramate ER 50 MG Cp24   Take 50 mg by mouth every morning.    Commonly known as:  Arielle Craig

## (undated) NOTE — MR AVS SNAPSHOT
[address] [facility] [address]  [facility],  [address]  [address] [place] [address] [phone]               Thank you for choosing us for your health care visit with [facility] YAN NAQVI.   We are glad to serve you and happy to provide you with this stick with. Choose activities you like. Go slowly, especially when just starting out. Work up to being active 30 minutes on most days. Aim for a total of 150 or more minutes a week. Why be fit?   People who are physically fit:  · Are more alert and product BuPROPion HCl 100 MG Tabs   Take 1 tablet (100 mg total) by mouth 2 (two) times daily. Commonly known as:  WELLBUTRIN           CALTRATE 600 OR   Take 1 tablet by mouth daily.            Cyclobenzaprine HCl 10 MG Tabs   TAKE 1 TABLET (10 MG TOTAL) BY GEOVANNA Phone:  [phone]    - Lisinopril-Hydrochlorothiazide 10-12.5 MG Tabs  - omeprazole 20 MG Cpdr      Information about where to get these medications is not yet available     !  Ask your nurse or doctor about these medications    - VSL#3 Caps

## (undated) NOTE — LETTER
Date: 5/2/2024    Patient Name: Karly Green          To Whom it may concern:    This letter has been written at the patient's request. The above patient was seen at [facility] for treatment of a medical condition.    Please place membership on hold for three months from May until August due to upcoming surgery.    Sincerely,      Dilshad Grossman PA-C